# Patient Record
Sex: FEMALE | Race: BLACK OR AFRICAN AMERICAN | NOT HISPANIC OR LATINO | Employment: UNEMPLOYED | URBAN - METROPOLITAN AREA
[De-identification: names, ages, dates, MRNs, and addresses within clinical notes are randomized per-mention and may not be internally consistent; named-entity substitution may affect disease eponyms.]

---

## 2017-11-09 ENCOUNTER — AMBULATORY - HEALTHEAST (OUTPATIENT)
Dept: MULTI SPECIALTY CLINIC | Facility: CLINIC | Age: 27
End: 2017-11-09

## 2017-11-09 LAB — PAP SMEAR - HIM PATIENT REPORTED: NORMAL

## 2019-05-03 ENCOUNTER — OFFICE VISIT - HEALTHEAST (OUTPATIENT)
Dept: FAMILY MEDICINE | Facility: CLINIC | Age: 29
End: 2019-05-03

## 2019-05-03 DIAGNOSIS — F41.1 GENERALIZED ANXIETY DISORDER: ICD-10-CM

## 2019-05-03 DIAGNOSIS — G47.00 PERSISTENT INSOMNIA: ICD-10-CM

## 2019-05-03 DIAGNOSIS — L73.2 SUPPURATIVE HIDRADENITIS: ICD-10-CM

## 2019-05-03 DIAGNOSIS — F10.10 ALCOHOL ABUSE, EPISODIC DRINKING BEHAVIOR: ICD-10-CM

## 2019-05-03 DIAGNOSIS — F33.2 SEVERE RECURRENT MAJOR DEPRESSION WITHOUT PSYCHOTIC FEATURES (H): ICD-10-CM

## 2019-05-03 ASSESSMENT — MIFFLIN-ST. JEOR: SCORE: 1979.15

## 2019-05-05 LAB — BACTERIA SPEC CULT: NORMAL

## 2019-05-06 ENCOUNTER — COMMUNICATION - HEALTHEAST (OUTPATIENT)
Dept: FAMILY MEDICINE | Facility: CLINIC | Age: 29
End: 2019-05-06

## 2019-11-13 ENCOUNTER — OFFICE VISIT - HEALTHEAST (OUTPATIENT)
Dept: FAMILY MEDICINE | Facility: CLINIC | Age: 29
End: 2019-11-13

## 2019-11-13 DIAGNOSIS — S21.001D WOUND OF RIGHT BREAST, SUBSEQUENT ENCOUNTER: ICD-10-CM

## 2019-11-13 DIAGNOSIS — T14.8XXA OPEN WOUND OF SKIN: ICD-10-CM

## 2019-11-13 ASSESSMENT — ANXIETY QUESTIONNAIRES
4. TROUBLE RELAXING: MORE THAN HALF THE DAYS
5. BEING SO RESTLESS THAT IT IS HARD TO SIT STILL: NOT AT ALL
6. BECOMING EASILY ANNOYED OR IRRITABLE: NEARLY EVERY DAY
2. NOT BEING ABLE TO STOP OR CONTROL WORRYING: NEARLY EVERY DAY
3. WORRYING TOO MUCH ABOUT DIFFERENT THINGS: NEARLY EVERY DAY
GAD7 TOTAL SCORE: 15
1. FEELING NERVOUS, ANXIOUS, OR ON EDGE: NEARLY EVERY DAY
7. FEELING AFRAID AS IF SOMETHING AWFUL MIGHT HAPPEN: SEVERAL DAYS

## 2019-11-13 ASSESSMENT — PATIENT HEALTH QUESTIONNAIRE - PHQ9: SUM OF ALL RESPONSES TO PHQ QUESTIONS 1-9: 14

## 2020-10-20 ENCOUNTER — AMBULATORY - HEALTHEAST (OUTPATIENT)
Dept: OTHER | Facility: CLINIC | Age: 30
End: 2020-10-20

## 2020-10-20 ENCOUNTER — DOCUMENTATION ONLY (OUTPATIENT)
Dept: OTHER | Facility: CLINIC | Age: 30
End: 2020-10-20

## 2020-12-18 ENCOUNTER — RECORDS - HEALTHEAST (OUTPATIENT)
Dept: ADMINISTRATIVE | Facility: OTHER | Age: 30
End: 2020-12-18

## 2021-03-04 ENCOUNTER — COMMUNICATION - HEALTHEAST (OUTPATIENT)
Dept: SCHEDULING | Facility: CLINIC | Age: 31
End: 2021-03-04

## 2021-05-04 ENCOUNTER — OFFICE VISIT - HEALTHEAST (OUTPATIENT)
Dept: FAMILY MEDICINE | Facility: CLINIC | Age: 31
End: 2021-05-04

## 2021-05-04 DIAGNOSIS — N94.6 DYSMENORRHEA: ICD-10-CM

## 2021-05-04 DIAGNOSIS — F41.1 GENERALIZED ANXIETY DISORDER: ICD-10-CM

## 2021-05-04 DIAGNOSIS — F33.2 SEVERE EPISODE OF RECURRENT MAJOR DEPRESSIVE DISORDER, WITHOUT PSYCHOTIC FEATURES (H): ICD-10-CM

## 2021-05-04 ASSESSMENT — ANXIETY QUESTIONNAIRES
3. WORRYING TOO MUCH ABOUT DIFFERENT THINGS: NEARLY EVERY DAY
1. FEELING NERVOUS, ANXIOUS, OR ON EDGE: NEARLY EVERY DAY
7. FEELING AFRAID AS IF SOMETHING AWFUL MIGHT HAPPEN: MORE THAN HALF THE DAYS
4. TROUBLE RELAXING: NOT AT ALL
5. BEING SO RESTLESS THAT IT IS HARD TO SIT STILL: SEVERAL DAYS
6. BECOMING EASILY ANNOYED OR IRRITABLE: NEARLY EVERY DAY
GAD7 TOTAL SCORE: 14
2. NOT BEING ABLE TO STOP OR CONTROL WORRYING: MORE THAN HALF THE DAYS

## 2021-05-04 ASSESSMENT — PATIENT HEALTH QUESTIONNAIRE - PHQ9: SUM OF ALL RESPONSES TO PHQ QUESTIONS 1-9: 18

## 2021-05-26 ASSESSMENT — PATIENT HEALTH QUESTIONNAIRE - PHQ9: SUM OF ALL RESPONSES TO PHQ QUESTIONS 1-9: 14

## 2021-05-27 ASSESSMENT — PATIENT HEALTH QUESTIONNAIRE - PHQ9: SUM OF ALL RESPONSES TO PHQ QUESTIONS 1-9: 18

## 2021-05-28 ASSESSMENT — ANXIETY QUESTIONNAIRES
GAD7 TOTAL SCORE: 15
GAD7 TOTAL SCORE: 14

## 2021-05-28 NOTE — TELEPHONE ENCOUNTER
----- Message from Medina Melton CNP sent at 5/5/2019  5:55 PM CDT -----  Please notify the patient that her wound culture is normal.  It did not show an infection.  Thanks.

## 2021-05-28 NOTE — TELEPHONE ENCOUNTER
Left message to call back for: results   Information to relay to patient:  Below message    Was unable to leave VM as patients mailbox is full.

## 2021-06-01 ENCOUNTER — RECORDS - HEALTHEAST (OUTPATIENT)
Dept: ADMINISTRATIVE | Facility: CLINIC | Age: 31
End: 2021-06-01

## 2021-06-02 ENCOUNTER — RECORDS - HEALTHEAST (OUTPATIENT)
Dept: ADMINISTRATIVE | Facility: CLINIC | Age: 31
End: 2021-06-02

## 2021-06-03 VITALS — WEIGHT: 285.5 LBS | BODY MASS INDEX: 50.59 KG/M2 | HEIGHT: 63 IN

## 2021-06-03 NOTE — PATIENT INSTRUCTIONS - HE
Start antibiotic two times a day for 10 days  Stop topical cream from ER  Dermatology referral placed to be seen sooner rather than later  Keep wound covered and change dressing two times a day   Ok to get wet from some water but no soap on the area and consider partially covering while bathing

## 2021-06-03 NOTE — PROGRESS NOTES
Assessment/Plan:    1. Open wound of skin  2. Wound of right breast, subsequent encounter  Pt diagnosed with candidiasis under R breast last week and started topical therapy - pt states a number of days ago she had sloughing of layer of skin and now her wound has been worsening - more painful, draining pus and bleeding. On exam she has a large wound that is draining purulent fluid and is exquisitely tender. Pt does not appear to be systemically ill/septic. Wound was re-dressed today. Recommend starting doxycycline two times a day for 10 days to cover potential MRSA given purulent drainage; recommend stopping topical therapy. Recommend keeping wound covered and changing dressing two times a day. Placed urgent referral to dermatology for further management of this wound.  - doxycycline (MONODOX) 100 MG capsule; Take 1 capsule (100 mg total) by mouth 2 (two) times a day for 10 days.  Dispense: 20 capsule; Refill: 0  - Ambulatory referral to Dermatology      Follow up: as needed    Natacha Guo MD  [facility]    Subjective:    Patient ID: Luis Antonio Laura is a 29 y.o. female is here today for breast wound    Wound on breast  -started a few weeks ago but worsening  -Was seen at [facility] on 11/5, diagnosed with right breast candidiasis, given prescription for topical clotrimazole/betamethasone cream  -Patient states she has been using the cream as prescribed  -She reports a few days ago a large amount of skin sloughed off  -has seen derm at [facility] in the past  -wound worsening since last week - more painful   -draining pus and bleeding  -located under R breast  -can't wear bras and limits pt's ROM of arm  -hx similar but more mild - hx hidradenitis  -no fevers reported      Patient Active Problem List   Diagnosis     Severe recurrent major depression without psychotic features (H)     Persistent insomnia     Generalized anxiety disorder     Alcohol abuse, episodic drinking behavior      Abnormal weight gain     Anemia during pregnancy     Hidradenitis suppurativa     History of cholestasis during pregnancy     Morbid obesity (H)     Morbid obesity with BMI of 50.0-59.9, adult (H)     Need for hepatitis B vaccination     Pregnancy, high-risk     Screening for malignant neoplasm of cervix     Past Medical History:   Diagnosis Date     Cholelithiases      IUD (intrauterine device) in place      Obesity      Past Surgical History:   Procedure Laterality Date     CHOLECYSTECTOMY       INTRAUTERINE DEVICE INSERTION       WISDOM TOOTH EXTRACTION       Current Outpatient Medications on File Prior to Visit   Medication Sig Dispense Refill     ARIPiprazole (ABILIFY) 2 MG tablet Take 1 tablet (2 mg total) by mouth daily. 30 tablet 0     clindamycin phosphate (CLINDAGEL) 1 % gel Apply to the affected area daily as needed. 75 mL 0     clotrimazole-betamethasone (LOTRISONE) cream Apply to affected area 2 times daily for the next week. 15 g 0     gabapentin (NEURONTIN) 100 MG capsule Take 100 mg by mouth 3 (three) times a day as needed. 90 capsule 0     hydrOXYzine pamoate (VISTARIL) 50 MG capsule Take 1 capsule (50 mg total) by mouth 3 (three) times a day as needed for anxiety. 90 capsule 0     traZODone (DESYREL) 50 MG tablet Take 1 tablet (50 mg total) by mouth at bedtime. 30 tablet 0     [DISCONTINUED] SUMAtriptan (IMITREX) 50 MG tablet Take 50 mg by mouth see administration instructions. Take 1 Tablet by mouth as needed for Migraine. May repeat one tablet after 2 hours if needed. Maximum 4 tabs/24 hours and 9 days/month.             No current facility-administered medications on file prior to visit.      Allergies   Allergen Reactions     Penicillins Hives     Sulfa (Sulfonamide Antibiotics) Anaphylaxis     Social History     Socioeconomic History     Marital status: Single     Spouse name: Not on file     Number of children: Not on file     Years of education: Not on file     Highest education level: Not  on file   Occupational History     Not on file   Social Needs     Financial resource strain: Not on file     Food insecurity:     Worry: Not on file     Inability: Not on file     Transportation needs:     Medical: Not on file     Non-medical: Not on file   Tobacco Use     Smoking status: Current Some Day Smoker     Years: 7.00     Smokeless tobacco: Never Used   Substance and Sexual Activity     Alcohol use: Yes     Alcohol/week: 14.0 standard drinks     Types: 14 Glasses of wine per week     Drug use: Yes     Types: Marijuana     Sexual activity: Not on file     Comment: single   Lifestyle     Physical activity:     Days per week: Not on file     Minutes per session: Not on file     Stress: Not on file   Relationships     Social connections:     Talks on phone: Not on file     Gets together: Not on file     Attends [employer] service: Not on file     Active member of club or organization: Not on file     Attends meetings of clubs or organizations: Not on file     Relationship status: Not on file     Intimate partner violence:     Fear of current or ex partner: Not on file     Emotionally abused: Not on file     Physically abused: Not on file     Forced sexual activity: Not on file   Other Topics Concern     Not on file   Social History Narrative     Not on file     Review of systems is as stated in HPI, and the remainder of system review is otherwise negative.    Objective:      /80   Pulse 78   Wt (!) 286 lb 6 oz (129.9 kg)   LMP 10/24/2019   BMI 50.73 kg/m      General appearance: awake, NAD, obese  HEENT: atraumatic, normocephalic, no scleral icterus or injection, ears and nose grossly normal, moist mucous membranes  Lungs: breathing comfortably on room air  Extremities: moving all extremities  Skin: large wound at inferior portion of R breast that appears to have lost overlying skin, draining purulent fluid, exquisitely tender throughout - no clear abscess/induration on exam - wound measures  6x3.5cm  Neuro: alert, oriented x3, CNs grossly intact, no focal deficits appreciated  Psych: normal mood/affect/behavior, answering questions appropriately, linear thought process

## 2021-06-04 VITALS
BODY MASS INDEX: 50.73 KG/M2 | DIASTOLIC BLOOD PRESSURE: 80 MMHG | WEIGHT: 286.38 LBS | SYSTOLIC BLOOD PRESSURE: 120 MMHG | HEART RATE: 78 BPM

## 2021-06-16 PROBLEM — F41.1 GENERALIZED ANXIETY DISORDER: Status: ACTIVE | Noted: 2019-04-24

## 2021-06-16 PROBLEM — O99.019 ANEMIA DURING PREGNANCY: Status: ACTIVE | Noted: 2018-08-28

## 2021-06-16 PROBLEM — Z23 NEED FOR HEPATITIS B VACCINATION: Status: ACTIVE | Noted: 2018-03-12

## 2021-06-16 PROBLEM — G47.00 PERSISTENT INSOMNIA: Status: ACTIVE | Noted: 2019-04-24

## 2021-06-16 PROBLEM — Z87.59 HISTORY OF CHOLESTASIS DURING PREGNANCY: Status: ACTIVE | Noted: 2018-10-12

## 2021-06-16 PROBLEM — Z87.19 HISTORY OF CHOLESTASIS DURING PREGNANCY: Status: ACTIVE | Noted: 2018-10-12

## 2021-06-16 PROBLEM — O09.90 PREGNANCY, HIGH-RISK: Status: ACTIVE | Noted: 2019-11-13

## 2021-06-16 PROBLEM — E66.01 MORBID OBESITY WITH BMI OF 50.0-59.9, ADULT (H): Status: ACTIVE | Noted: 2018-08-17

## 2021-06-16 PROBLEM — F10.10 ALCOHOL ABUSE, EPISODIC DRINKING BEHAVIOR: Status: ACTIVE | Noted: 2019-04-24

## 2021-06-17 NOTE — PROGRESS NOTES
Luis Antonio Laura is a 30 y.o. female who is being evaluated via a billable telephone visit.      What phone number would you like to be contacted at? [phone]  How would you like to obtain your AVS? AVS Preference: Kayla.    1. Dysmenorrhea  naproxen (NAPROSYN) 500 MG tablet    Ambulatory referral to Obstetrics / Gynecology   2. Generalized anxiety disorder     3. Severe episode of recurrent major depressive disorder, without psychotic features (H)       Will treat dysmenorrhea with naproxen 500 mg twice daily with food as needed.  Educated on its indications and side effects.  She is to avoid taking this with others NSAIDs.  Offered to have her follow-up in clinic to replace the Kyleena IUD with Mirena IUD, but she declines.  Will refer to OB/GYN to discuss other treatment options.  Patient has severe depression and anxiety.  She is seeing psychiatry at [facility].  I encouraged her to reach out to them regarding her increased PHQ-9 number and for further medication management.  She has crisis numbers at home.  She is content with the plan.    Subjective   Luis Antonio Laura is 30 y.o. and presents today for the following health issues   HPI   Patient calls today with concerns of dysmenorrhea.  Patient has been experiencing menstrual cramps the week before her period and during her period since adolescents.  She did have the Mirena IUD in place for multiple years which provided significant assistance.  When she went to have this replaced at [facility], they told her that they would only replace it with the Kyleena.  Patient has had the Kyleena IUD for 1 to 2 years and her menstrual cramps have worsened.  She has irregular menses and when she does experience a period, the cramps will wake her up at night.  Her last menstrual period was 1 month ago and lasted for 4 days.  She has moderate vaginal bleeding.  She denies nausea or vomiting.  She has tried using over-the-counter Tylenol,  ibuprofen, Aleve, Midol, and Pamprin.  Patient has anxiety and depression.  She sees [facility] where she was treated by psychiatrist.  She is currently taking olanzapine, venlafaxine, Abilify, and gabapentin.  She has crisis numbers at home.              Review of Systems  As noted above, otherwise negative.      Objective       Vitals:  No vitals were obtained today due to virtual visit.    Physical Exam  Patient is alert and is speaking clearly.  She does not sound short of breath or have a cough.          Phone call duration: 9 minutes

## 2021-06-25 ENCOUNTER — COMMUNICATION - HEALTHEAST (OUTPATIENT)
Dept: FAMILY MEDICINE | Facility: CLINIC | Age: 31
End: 2021-06-25

## 2021-06-25 ENCOUNTER — AMBULATORY - HEALTHEAST (OUTPATIENT)
Dept: FAMILY MEDICINE | Facility: CLINIC | Age: 31
End: 2021-06-25

## 2021-06-25 DIAGNOSIS — B96.89 BV (BACTERIAL VAGINOSIS): ICD-10-CM

## 2021-06-25 DIAGNOSIS — N76.0 BV (BACTERIAL VAGINOSIS): ICD-10-CM

## 2021-07-05 PROBLEM — O09.90 PREGNANCY, HIGH-RISK: Status: RESOLVED | Noted: 2019-11-13 | Resolved: 2021-06-25

## 2021-07-05 PROBLEM — E66.01 MORBID OBESITY WITH BMI OF 50.0-59.9, ADULT (H): Status: RESOLVED | Noted: 2018-08-17 | Resolved: 2021-06-25

## 2021-07-05 PROBLEM — O99.019 ANEMIA DURING PREGNANCY: Status: RESOLVED | Noted: 2018-08-28 | Resolved: 2021-06-25

## 2021-07-07 NOTE — TELEPHONE ENCOUNTER
Reason contacted:  Test results within this encounter   Information relayed:  Dr Guo's notes below   Additional questions:  No  Further follow-up needed:  No  Okay to leave a detailed message:  No

## 2021-07-07 NOTE — TELEPHONE ENCOUNTER
----- Message from Natacha Guo MD sent at 6/25/2021  2:21 PM CDT -----  Please call patient to let her know her vaginal swab came back positive for bacterial vaginosis (BV) infection.  This is a common vaginal infection and is not sexually transmitted.  I have sent an antibiotic to her pharmacy for treatment.    Thanks,  Dr. Guo

## 2021-07-14 PROBLEM — O99.019 ANEMIA DURING PREGNANCY: Status: RESOLVED | Noted: 2018-08-28 | Resolved: 2021-06-25

## 2021-07-14 PROBLEM — O09.90 PREGNANCY, HIGH-RISK: Status: RESOLVED | Noted: 2019-11-13 | Resolved: 2021-06-25

## 2021-08-03 PROBLEM — E66.01 MORBID OBESITY WITH BMI OF 50.0-59.9, ADULT (H): Status: RESOLVED | Noted: 2018-08-17 | Resolved: 2021-06-25

## 2021-10-11 ENCOUNTER — TELEPHONE (OUTPATIENT)
Dept: FAMILY MEDICINE | Facility: CLINIC | Age: 31
End: 2021-10-11

## 2021-10-11 NOTE — TELEPHONE ENCOUNTER
Reason for call:  Pregnancy Confirmation     Phone number to reach patient:  [phone]    Best Time:  Anytime before 3pm    Can we leave a detailed message on this number?  YES

## 2021-10-15 ENCOUNTER — OFFICE VISIT (OUTPATIENT)
Dept: FAMILY MEDICINE | Facility: CLINIC | Age: 31
End: 2021-10-15
Payer: COMMERCIAL

## 2021-10-15 VITALS
HEART RATE: 101 BPM | SYSTOLIC BLOOD PRESSURE: 100 MMHG | DIASTOLIC BLOOD PRESSURE: 60 MMHG | WEIGHT: 264.19 LBS | BODY MASS INDEX: 48.32 KG/M2

## 2021-10-15 DIAGNOSIS — N91.2 AMENORRHEA: Primary | ICD-10-CM

## 2021-10-15 DIAGNOSIS — F33.1 MODERATE EPISODE OF RECURRENT MAJOR DEPRESSIVE DISORDER (H): ICD-10-CM

## 2021-10-15 DIAGNOSIS — Z87.59 HISTORY OF CHOLESTASIS DURING PREGNANCY: ICD-10-CM

## 2021-10-15 DIAGNOSIS — Z87.51 HISTORY OF PRETERM DELIVERY: ICD-10-CM

## 2021-10-15 DIAGNOSIS — E66.01 MORBID OBESITY (H): ICD-10-CM

## 2021-10-15 DIAGNOSIS — Z87.19 HISTORY OF CHOLESTASIS DURING PREGNANCY: ICD-10-CM

## 2021-10-15 DIAGNOSIS — F41.1 GENERALIZED ANXIETY DISORDER: ICD-10-CM

## 2021-10-15 LAB — HCG UR QL: POSITIVE

## 2021-10-15 PROCEDURE — 81025 URINE PREGNANCY TEST: CPT | Performed by: FAMILY MEDICINE

## 2021-10-15 PROCEDURE — 99214 OFFICE O/P EST MOD 30 MIN: CPT | Performed by: FAMILY MEDICINE

## 2021-10-15 RX ORDER — B-COMPLEX WITH VITAMIN C
1 TABLET ORAL DAILY
COMMUNITY
Start: 2021-06-25 | End: 2022-12-14

## 2021-10-15 RX ORDER — OLANZAPINE 10 MG/1
10 TABLET ORAL DAILY
COMMUNITY
Start: 2020-12-20 | End: 2022-01-13

## 2021-10-15 RX ORDER — VENLAFAXINE 75 MG/1
75 TABLET ORAL DAILY
COMMUNITY
End: 2022-01-13

## 2021-10-15 NOTE — PATIENT INSTRUCTIONS
You can wait for the hospital to call you to set up your ultrasound OR you can call [phone] to schedule it    Reach out to your psychiatrist to see if they want to make any medication changes now that you are pregnant      Tips to Relieve Nausea  Although nausea can occur at any time of the day, it may be worse in the morning. To help prevent nausea:    Eat small, light meals at frequent intervals.    Get up slowly. Eat a few unsalted crackers before you get out of bed.    Drink water or 7-Up to soothe your stomach.    Eat an ice pop in your favorite flavor.    Ask your health care provider about taking saji or vitamin B6/doxylamine for nausea and vomiting.    Talk with your health care provider if you take vitamins that upset your stomach.  Safe Medications    Take one prenatal vitamin with at least 400 mcg of folic acid daily.    Use acetaminophen (Tylenol) for pain.     Try Maalox or Tums for heartburn. If these are not working, talk to your doctor about trying a different medication.    Diphenhydramine (Benadryl) can also be used safely in pregnancy.    Talk to your doctor about any other medications or vitamins you are taking!  Start Healthy Habits Now  What matters most is protecting your baby from this moment on. If you smoke, drink alcohol, or use drugs, now is the time to stop. If you need help, talk with your health care provider.    Smoking increases the risk of miscarriage or having a low-birth-weight baby. If you smoke, quit now.    Alcohol and drugs have been linked with miscarriage, birth defects, intellectual disability, and low birth weight. Do not drink alcohol or take drugs.    Continue your current exercise routine, or start one with walking, swimming, and other low impact exercises. Yoga specifically designed for pregnant moms is a great stress and pain reliever. Keep your self well hydrated and avoid overheating with all activity. If bleeding, fluid leakage, or cramping/contractions  occur, stop the exercise and call your doctor.     Wear your seatbelt every time you are in the car. Fasten the lap part underneath your growing belly and the shoulder part as you normally would.   Weight Gain    Add an additional 300 to 400 calories a day into your diet.    Ideal weight gain is 25 to 35 pounds.    If your BMI is over 30, your ideal weight gain is 15 pounds.    If your BMI is under 20, your ideal weight gain is 40 pounds.    Talk to your doctor if you are concerned about weight gain.   Keep Your Environment Save  You can still clean house and use scented products. Just take some simple precautions:    Wear gloves when using cleaning fluids.    Open windows to let in fresh air. Use a fan if you paint.    Avoid secondhand smoke.    Don t breathe fumes from nail polish, hair spray, cleansers, or other chemicals.  Work Concerns  The end of the first trimester is a good time to discuss working during pregnancy with your employer. Follow your health care provider s advice if your job requires you to stand for a long time, work with hazardous tools, or even sit at a desk all day. Your workspace, workload, or scheduled hours may need to be adjusted - perhaps you can change body postures more often or take an extra break.  Intimacy  Unless your health care provider tells you to, there is no reason to stop having sex while you re pregnant. You or your partner may notice changes in desire. Desire may be less in the first trimester, due to nausea and fatigue. In the second trimester, sex may be very enjoyable. The third trimester can be a challenge comfort-wise. Try different positions and see what s best for you both. As always, let your doctor know if you experience any bleeding, leakage of fluids, or cramping/contractions.  Other Pregnancy Concerns    Limit the amount of radiation you are exposed to. Always tell the radiology technologist that you are pregnant if having an xray or CT scan done.     Practice  good hand washing to prevent infection.    Avoid cat litter.     Wash all fruits and vegetabes. Always cook meat thoroughly and do not eat raw fish. Do not eat more than 6 to 12 ounces of other fish sources.     Do not eat soft cheeses.    Limit caffeine to less than 300 milligrams a days. The average cup of coffee as approximately 120 milligrams of caffeine.

## 2021-10-15 NOTE — PROGRESS NOTES
Assessment/Plan:    Amenorrhea  UPT positive.  at 6+6 weeks by LMP. Overall doing well, discussed management of nausea. Dating US ordered. Prenatal folder given. Pt is already taking prenatal vitamins. She will follow with me for her prenatal care.   - HCG qualitative urine  - US OB < 14 Weeks Single    History of cholestasis during pregnancy  History of  delivery  Hx cholestasis during both prior pregnancies requiring induction/delivery around 34-36 weeks; discussed high risk for cholestasis again this pregnancy. Will plan to collect baseline labs (LFTs, bile acids) and then monitor closely.    Generalized anxiety disorder  Moderate episode of recurrent major depressive disorder (H)  Follows with psychiatry - currently taking effexor and zyprexa - discussed both of these can be used in pregnancy if needed for maternal mental health care/stability - encouraged pt to reach out to her psychiatrist to see if they recommend any dose/medication changes.    Morbid obesity (H)  BMI 48.32 today.          Follow up: 2-4 weeks for initial OB visit (timing depends on dating US)    Natacha Guo MD  [facility]    Subjective:   Luis Antonio Laura is a 31 year old female is here today for pregnancy confirmation    Pregnancy confirmation  Nausea - feeling sick and taste specific/particular, will vomit sometimes  Tired   A little breast tenderness - HS flaring a bit  Mood - doing good - venlafaxine, olanzapine - sees psychiatry    Prior pregnancies  -cholestasis in both pregnancies at the end  34-35 weeks - overall did well, NICU  No hx PPH    LMP 21 - BRENNON 22 - 6+6 weeks today      Patient Active Problem List   Diagnosis     Severe episode of recurrent major depressive disorder (H)     Persistent insomnia     Generalized anxiety disorder     Alcohol abuse, episodic drinking behavior     Hidradenitis suppurativa     History of cholestasis during pregnancy     Morbid obesity (H)     Need for  hepatitis B vaccination     Screening for malignant neoplasm of cervix     Past Medical History:   Diagnosis Date     Cholelithiases      Obesity      Past Surgical History:   Procedure Laterality Date     CHOLECYSTECTOMY       INSERT INTRAUTERINE DEVICE       WISDOM TOOTH EXTRACTION       Current Outpatient Medications   Medication     OLANZapine (ZYPREXA) 10 MG tablet     Prenatal Multivit-Min-Fe-FA (PRENATAL, W/IRON & FA,) 27-0.8 MG TABS     venlafaxine (EFFEXOR) 75 MG tablet     No current facility-administered medications for this visit.     Allergies   Allergen Reactions     Penicillins Hives and Rash     itching       Sulfa Drugs      Social History     Socioeconomic History     Marital status: Not on file     Spouse name: Not on file     Number of children: Not on file     Years of education: Not on file     Highest education level: Not on file   Occupational History     Not on file   Tobacco Use     Smoking status: Current Some Day Smoker     Years: 7.00     Smokeless tobacco: Never Used   Substance and Sexual Activity     Alcohol use: Yes     Alcohol/week: 14.0 standard drinks     Drug use: Yes     Types: Marijuana     Sexual activity: Not on file     Comment: single   Other Topics Concern     Not on file   Social History Narrative     Not on file     Social Determinants of Health     Financial Resource Strain:      Difficulty of Paying Living Expenses:    Food Insecurity:      Worried About Running Out of Food in the Last Year:      Ran Out of Food in the Last Year:    Transportation Needs:      Lack of Transportation (Medical):      Lack of Transportation (Non-Medical):    Physical Activity:      Days of Exercise per Week:      Minutes of Exercise per Session:    Stress:      Feeling of Stress :    Social Connections:      Frequency of Communication with Friends and Family:      Frequency of Social Gatherings with Friends and Family:      Attends [employer] Services:      Active Member of Clubs or  Organizations:      Attends Club or Organization Meetings:      Marital Status:    Intimate Partner Violence:      Fear of Current or Ex-Partner:      Emotionally Abused:      Physically Abused:      Sexually Abused:      Family History   Problem Relation Age of Onset     Multiple Sclerosis Mother      Hypertension Maternal Grandmother      Diabetes Maternal Grandmother      Heart Disease Maternal Grandmother      Hypertension Paternal Grandmother      Review of systems is as stated in HPI, and the remainder of system review is otherwise negative.    Objective:     /60   Pulse 101   Wt 119.8 kg (264 lb 3 oz)   LMP 10/15/2021   BMI 48.32 kg/m      General appearance: awake, NAD, obese  HEENT: atraumatic, normocephalic, no scleral icterus or injection  Lungs: breathing comfortably on room air  Extremities: moving all extremities  Neuro: alert, oriented x3, CNs grossly intact, no focal deficits appreciated  Psych: normal mood/affect/behavior, answering questions appropriately, linear thought process

## 2021-10-15 NOTE — PROGRESS NOTES
Nausea - feeling sick and taste specific/particular, will vomit sometimes  Tired   A little breast tenderness - HS flaring a bit  Mood - doing good - venlafaxine, olanzapine    Prior pregnancies  -cholestasis in both pregnancies at the end  34-35 weeks - overall did well, NICU  No hx PPH    LMP 8/28/21

## 2021-10-16 ENCOUNTER — HEALTH MAINTENANCE LETTER (OUTPATIENT)
Age: 31
End: 2021-10-16

## 2021-10-22 ENCOUNTER — HOSPITAL ENCOUNTER (OUTPATIENT)
Dept: ULTRASOUND IMAGING | Facility: CLINIC | Age: 31
Discharge: HOME | End: 2021-10-22
Attending: FAMILY MEDICINE | Admitting: FAMILY MEDICINE
Payer: COMMERCIAL

## 2021-10-22 DIAGNOSIS — N91.2 AMENORRHEA: ICD-10-CM

## 2021-10-22 PROCEDURE — 76801 OB US < 14 WKS SINGLE FETUS: CPT

## 2021-12-15 NOTE — PROGRESS NOTES
Clinic Note - Initial OB Visit    Luis Antonio Laura is a 31 year old  female who presents to clinic for a follow up OB visit. She is currently 15+5 wks with an estimated date of delivery of 2022 via LMP c/w 1st tri US. She denies headache, chest pain, shortness of breath, abdominal pain/contractions, vaginal bleeding, or abnormal discharge. She has not felt baby move.      New concerns today:   -hx PONCHO/depression - manageable/tolerable   -feeling ok overall  -sometimes getting an occasional cramp on R side, 4/10, changing position or if standing up    OB History    Para Term  AB Living   3 2 0 2 0 2   SAB IAB Ectopic Multiple Live Births   0 0 0 0 2      # Outcome Date GA Lbr Burak/2nd Weight Sex Delivery Anes PTL Lv   3 Current            2  10/16/18 36w6d 02:07 / 00:18 2.41 kg (5 lb 5 oz) M Vag-Spont  N PORSHA      Name: Zeb      Apgar1: 9  Apgar5: 9   1  07/05/10 36w2d  2.353 kg (5 lb 3 oz) M Vag-Spont   PORSHA      Name: Sadiq       Physical exam:  /78 (BP Location: Left arm, Patient Position: Sitting, Cuff Size: Adult Regular)   Wt 118.3 kg (260 lb 14.4 oz)   LMP 2021   BMI 47.72 kg/m      General: alert, female in no acute distress  Abdomen: gravid uterus appropriate for gestation age above pubic symphysis, non-tender, FHTs 150  Extremities: no edema    Supervision of high risk pregnancy, antepartum   at 15+5 weeks. Pregnancy complicated by conditions as below. Labs as below.   - ABO/Rh type and screen  - Hepatitis B surface antigen  - CBC with platelets  - HIV Antigen Antibody Combo  - Rubella Antibody IgG  - Treponema Abs w Reflex to RPR and Titer  - Urine Culture Aerobic Bacterial  - Chlamydia trachomatis PCR  - Neisseria gonorrhoeae PCR  - Vitamin D Deficiency  - Hepatitis C antibody  - Hemoglobin A1c  - [facility] Referral - Pregnancy    Morbid obesity (H)  BMI >40. Referral to [facility] for level 2 anatomy scan. Will need early 1 hr GCT, visits  every 2 weeks at 28 weeks, weekly  testing at 36 weeks, offer induction at 39 weeks, goal weight gain <10 lbs.  - [facility] Referral - Pregnancy    History of cholestasis during pregnancy  History of  delivery  Hx  delivery x2 d/t induction for cholestasis. High risk of recurrence in this pregnancy, pt will monitor for symptoms.   - [facility] Referral - Pregnancy    Generalized anxiety disorder  Moderate episode of recurrent major depressive disorder (H)  Pt reports sx overall controlled at this time - working with psychiatry.        Reviewed pre-goran labs - O pos  Reviewed genetic screening options, including false positive rate of 5% with screening tests and diagnostic options (chorionic villus sampling, amniocentesis), and patient declines  Continued recommendation for breastfeeding.  Discussed warning signs to watch for and expectations for second trimester.     Follow up in 4 weeks for routine prenatal visit.     Natacha Guo MD  [facility]

## 2021-12-16 ENCOUNTER — PRENATAL OFFICE VISIT (OUTPATIENT)
Dept: FAMILY MEDICINE | Facility: CLINIC | Age: 31
End: 2021-12-16
Payer: COMMERCIAL

## 2021-12-16 ENCOUNTER — TRANSCRIBE ORDERS (OUTPATIENT)
Dept: MATERNAL FETAL MEDICINE | Facility: HOSPITAL | Age: 31
End: 2021-12-16

## 2021-12-16 VITALS — DIASTOLIC BLOOD PRESSURE: 78 MMHG | BODY MASS INDEX: 47.72 KG/M2 | SYSTOLIC BLOOD PRESSURE: 122 MMHG | WEIGHT: 260.9 LBS

## 2021-12-16 DIAGNOSIS — E66.01 MORBID OBESITY (H): ICD-10-CM

## 2021-12-16 DIAGNOSIS — Z87.51 HISTORY OF PRETERM DELIVERY: ICD-10-CM

## 2021-12-16 DIAGNOSIS — Z87.19 HISTORY OF CHOLESTASIS DURING PREGNANCY: ICD-10-CM

## 2021-12-16 DIAGNOSIS — F41.1 GENERALIZED ANXIETY DISORDER: ICD-10-CM

## 2021-12-16 DIAGNOSIS — Z87.59 HISTORY OF CHOLESTASIS DURING PREGNANCY: ICD-10-CM

## 2021-12-16 DIAGNOSIS — O09.90 SUPERVISION OF HIGH RISK PREGNANCY, ANTEPARTUM: Primary | ICD-10-CM

## 2021-12-16 DIAGNOSIS — O26.90 PREGNANCY RELATED CONDITION, ANTEPARTUM: Primary | ICD-10-CM

## 2021-12-16 DIAGNOSIS — F33.1 MODERATE EPISODE OF RECURRENT MAJOR DEPRESSIVE DISORDER (H): ICD-10-CM

## 2021-12-16 LAB
ABO/RH(D): NORMAL
ANTIBODY SCREEN: NEGATIVE
ERYTHROCYTE [DISTWIDTH] IN BLOOD BY AUTOMATED COUNT: 12.1 % (ref 10–15)
HBA1C MFR BLD: 5.2 % (ref 0–5.6)
HCT VFR BLD AUTO: 32.7 % (ref 35–47)
HGB BLD-MCNC: 11.4 G/DL (ref 11.7–15.7)
HIV 1+2 AB+HIV1 P24 AG SERPL QL IA: NEGATIVE
MCH RBC QN AUTO: 31.3 PG (ref 26.5–33)
MCHC RBC AUTO-ENTMCNC: 34.9 G/DL (ref 31.5–36.5)
MCV RBC AUTO: 90 FL (ref 78–100)
PLATELET # BLD AUTO: 330 10E3/UL (ref 150–450)
RBC # BLD AUTO: 3.64 10E6/UL (ref 3.8–5.2)
SPECIMEN EXPIRATION DATE: NORMAL
WBC # BLD AUTO: 8.8 10E3/UL (ref 4–11)

## 2021-12-16 PROCEDURE — 85027 COMPLETE CBC AUTOMATED: CPT | Performed by: FAMILY MEDICINE

## 2021-12-16 PROCEDURE — 86803 HEPATITIS C AB TEST: CPT | Performed by: FAMILY MEDICINE

## 2021-12-16 PROCEDURE — 99207 PR FIRST OB VISIT: CPT | Performed by: FAMILY MEDICINE

## 2021-12-16 PROCEDURE — 87389 HIV-1 AG W/HIV-1&-2 AB AG IA: CPT | Performed by: FAMILY MEDICINE

## 2021-12-16 PROCEDURE — 83036 HEMOGLOBIN GLYCOSYLATED A1C: CPT | Performed by: FAMILY MEDICINE

## 2021-12-16 PROCEDURE — 86900 BLOOD TYPING SEROLOGIC ABO: CPT | Performed by: FAMILY MEDICINE

## 2021-12-16 PROCEDURE — 36415 COLL VENOUS BLD VENIPUNCTURE: CPT | Performed by: FAMILY MEDICINE

## 2021-12-16 PROCEDURE — 86762 RUBELLA ANTIBODY: CPT | Performed by: FAMILY MEDICINE

## 2021-12-16 PROCEDURE — 99213 OFFICE O/P EST LOW 20 MIN: CPT | Performed by: FAMILY MEDICINE

## 2021-12-16 PROCEDURE — 86780 TREPONEMA PALLIDUM: CPT | Performed by: FAMILY MEDICINE

## 2021-12-16 PROCEDURE — 82306 VITAMIN D 25 HYDROXY: CPT | Performed by: FAMILY MEDICINE

## 2021-12-16 PROCEDURE — 86850 RBC ANTIBODY SCREEN: CPT | Performed by: FAMILY MEDICINE

## 2021-12-16 PROCEDURE — 87086 URINE CULTURE/COLONY COUNT: CPT | Performed by: FAMILY MEDICINE

## 2021-12-16 PROCEDURE — 86901 BLOOD TYPING SEROLOGIC RH(D): CPT | Performed by: FAMILY MEDICINE

## 2021-12-16 PROCEDURE — 87340 HEPATITIS B SURFACE AG IA: CPT | Performed by: FAMILY MEDICINE

## 2021-12-16 RX ORDER — PRENATAL VIT/IRON FUM/FOLIC AC 27MG-0.8MG
1 TABLET ORAL DAILY
COMMUNITY
Start: 2021-12-08 | End: 2022-02-04

## 2021-12-16 NOTE — PATIENT INSTRUCTIONS
Phone Numbers:  [facility]: [phone]  [facility]: [phone]    When to call or come in to the hospital     If you have any bleeding or leakage of fluids.     If you have uterine cramping that does not resolve with rest and fluids.    If you have a headache not resolved with tylenol, right upper abdominal pain, or sudden onset of swelling.    You know your body best. Never hesitate to call or go to the hospital if something doesn't feel right!    Genetic Screening    Sampling of your blood to screen for genetic abnormalities, like Down's syndrome, in your baby    Done at 16-18 weeks gestation    Screening test only - further testing, like advanced ultrasound or amniocentesis, would be needed to confirm positive test!    Recommended for mothers over age 35, history of genetic abnormalities.    Talk to your doctor if you have further questions, or are interested in this screening test.     Breastfeeding    Breast feeding is best, for you and baby!     Recommendations are for exclusive breastfeeding for babies first 6 months of life.    Talk to your doctor if you have more questions about breastfeeding your baby.    Other Pregnancy Concerns    Continue to take a prenatal vitamin daily    Maintain an active, healthy lifestyle.     If this is your first baby, you can expect to start feeling movement at 20-24 weeks gestation. If this is your second or more pregnancy, you will generally start feeling movement at 18-22 weeks gestation.   [facility] parenting classes [email or web address]  [facility] parenting classes [email or web address]  Free online classes through [employer]

## 2021-12-17 LAB
DEPRECATED CALCIDIOL+CALCIFEROL SERPL-MC: 18 UG/L (ref 30–80)
HBV SURFACE AG SERPL QL IA: NONREACTIVE
HCV AB SERPL QL IA: NEGATIVE
RUBV IGG SERPL QL IA: 1.28 INDEX
RUBV IGG SERPL QL IA: POSITIVE
T PALLIDUM AB SER QL: NONREACTIVE

## 2021-12-18 LAB — BACTERIA UR CULT: NORMAL

## 2022-01-10 ENCOUNTER — OFFICE VISIT (OUTPATIENT)
Dept: MATERNAL FETAL MEDICINE | Facility: HOSPITAL | Age: 32
End: 2022-01-10
Attending: FAMILY MEDICINE
Payer: COMMERCIAL

## 2022-01-10 ENCOUNTER — ANCILLARY PROCEDURE (OUTPATIENT)
Dept: ULTRASOUND IMAGING | Facility: HOSPITAL | Age: 32
End: 2022-01-10
Attending: FAMILY MEDICINE
Payer: COMMERCIAL

## 2022-01-10 DIAGNOSIS — O35.9XX0 SUSPECTED FETAL ANOMALY, ANTEPARTUM, SINGLE OR UNSPECIFIED FETUS: ICD-10-CM

## 2022-01-10 DIAGNOSIS — O99.210 OBESITY IN PREGNANCY: Primary | ICD-10-CM

## 2022-01-10 DIAGNOSIS — O26.90 PREGNANCY RELATED CONDITION, ANTEPARTUM: ICD-10-CM

## 2022-01-10 PROCEDURE — 76811 OB US DETAILED SNGL FETUS: CPT | Mod: 26 | Performed by: OBSTETRICS & GYNECOLOGY

## 2022-01-10 PROCEDURE — 99207 PR NO CHARGE LOS: CPT | Performed by: OBSTETRICS & GYNECOLOGY

## 2022-01-10 PROCEDURE — 76811 OB US DETAILED SNGL FETUS: CPT

## 2022-01-10 NOTE — PROGRESS NOTES
The patient was seen for an ultrasound in the [facility] at the [facility] today.  For a detailed report of the ultrasound examination, please see the ultrasound report which can be found under the imaging tab.    Lisa Contreras MD  , OB/GYN  Maternal-Fetal Medicine  [phone] (Pager)

## 2022-01-14 ENCOUNTER — TELEPHONE (OUTPATIENT)
Dept: FAMILY MEDICINE | Facility: CLINIC | Age: 32
End: 2022-01-14

## 2022-01-14 NOTE — TELEPHONE ENCOUNTER
Please schedule f/u OB appt with Dr. Guo. There was an error in the system and the appointment was not saved for some odd reason. Left message for patient to call back.

## 2022-01-20 ENCOUNTER — TRANSFERRED RECORDS (OUTPATIENT)
Dept: HEALTH INFORMATION MANAGEMENT | Facility: CLINIC | Age: 32
End: 2022-01-20
Payer: COMMERCIAL

## 2022-01-31 ENCOUNTER — OFFICE VISIT (OUTPATIENT)
Dept: MATERNAL FETAL MEDICINE | Facility: HOSPITAL | Age: 32
End: 2022-01-31
Attending: OBSTETRICS & GYNECOLOGY
Payer: COMMERCIAL

## 2022-01-31 ENCOUNTER — ANCILLARY PROCEDURE (OUTPATIENT)
Dept: ULTRASOUND IMAGING | Facility: HOSPITAL | Age: 32
End: 2022-01-31
Attending: OBSTETRICS & GYNECOLOGY
Payer: COMMERCIAL

## 2022-01-31 DIAGNOSIS — O99.210 OBESITY IN PREGNANCY: Primary | ICD-10-CM

## 2022-01-31 DIAGNOSIS — O35.9XX0 SUSPECTED FETAL ANOMALY, ANTEPARTUM, SINGLE OR UNSPECIFIED FETUS: ICD-10-CM

## 2022-01-31 DIAGNOSIS — O99.210 OBESITY IN PREGNANCY: ICD-10-CM

## 2022-01-31 PROCEDURE — 76816 OB US FOLLOW-UP PER FETUS: CPT | Mod: 26 | Performed by: OBSTETRICS & GYNECOLOGY

## 2022-01-31 PROCEDURE — 76816 OB US FOLLOW-UP PER FETUS: CPT

## 2022-01-31 PROCEDURE — 99207 PR NO CHARGE LOS: CPT | Performed by: OBSTETRICS & GYNECOLOGY

## 2022-01-31 NOTE — PROGRESS NOTES
"Please see \"Imaging\" tab under \"Chart Review\" for details of today's visit.    Lisset Avitia    "

## 2022-02-04 ENCOUNTER — PRENATAL OFFICE VISIT (OUTPATIENT)
Dept: FAMILY MEDICINE | Facility: CLINIC | Age: 32
End: 2022-02-04
Payer: COMMERCIAL

## 2022-02-04 VITALS
BODY MASS INDEX: 47.29 KG/M2 | HEIGHT: 62 IN | RESPIRATION RATE: 16 BRPM | TEMPERATURE: 99 F | DIASTOLIC BLOOD PRESSURE: 62 MMHG | OXYGEN SATURATION: 99 % | SYSTOLIC BLOOD PRESSURE: 110 MMHG | HEART RATE: 104 BPM | WEIGHT: 257 LBS

## 2022-02-04 DIAGNOSIS — Z87.19 HISTORY OF CHOLESTASIS DURING PREGNANCY: ICD-10-CM

## 2022-02-04 DIAGNOSIS — Z87.59 HISTORY OF CHOLESTASIS DURING PREGNANCY: ICD-10-CM

## 2022-02-04 DIAGNOSIS — O09.90 HIGH-RISK PREGNANCY, UNSPECIFIED TRIMESTER: Primary | ICD-10-CM

## 2022-02-04 DIAGNOSIS — Z23 HIGH PRIORITY FOR 2019-NCOV VACCINE: ICD-10-CM

## 2022-02-04 PROCEDURE — 99207 PR PRENATAL VISIT: CPT | Performed by: FAMILY MEDICINE

## 2022-02-04 PROCEDURE — 0051A COVID-19,PF,PFIZER (12+ YRS): CPT | Performed by: FAMILY MEDICINE

## 2022-02-04 PROCEDURE — 91305 COVID-19,PF,PFIZER (12+ YRS): CPT | Performed by: FAMILY MEDICINE

## 2022-02-04 ASSESSMENT — MIFFLIN-ST. JEOR: SCORE: 1828.99

## 2022-02-04 NOTE — PROGRESS NOTES
"SUBJECTIVE:   at 22w6d. Estimated Date of Delivery: 2022.  She reports her nausea is improved within the last 2 weeks but still with lack of appetite. She denies abdominal pain/cramping, vaginal bleeding, leakage of fluid. Fetal movement is present- started feeling about a month ago  Concerns: Insomnia- worse since stopping her psychiatric medications late December. She was previously on effexor and zyprexa. She follows with [facility], Pineda Farfan. She was previously following for care with Dr. Guo at [facility] but she recently moved and wants to transfer care here to [place].  ROS  Negative except as noted above in HPI.  OBJECTIVE:  Blood pressure 110/62, pulse 104, temperature 99  F (37.2  C), temperature source Oral, resp. rate 16, height 1.575 m (5' 2\"), weight 116.6 kg (257 lb), last menstrual period 2021, SpO2 99 %.  Gen: comfortable, no acute distress.  See OB Vitals flowsheet.  ASSESSMENT/PLAN:  Luis Antonio was seen today for prenatal care, insomnia and imm/inj.    Diagnoses and all orders for this visit:    High-risk pregnancy, unspecified trimester    History of cholestasis during pregnancy: Monitor symptoms closely- will need labs and  surveillance if she develops symptoms.    High priority for 2019-nCoV vaccine: Reviewed safety, effectiveness  -     COVID-19,PF,PFIZER (12+ Yrs GRAY LABEL)    Depression/Anxiety: ARMANDO completed today for [facility], provider Pineda Farfan. She was previously taking effexor and zyprexa but stopped these in late December. Mood and sleep has worsened since that time. Reviewed risk/benefit of medications and I will discuss with her psychiatric provider, but encouraged her to restart her mediations.    History of obesity: Continue with growth US per [facility] recommendation q4-6 weeks.     -IUP at 22w6d (transfer of care due to move):     Prenatal labs reviewed     Fetal survey was done at [facility]- normal     RTC in 4 weeks or " sooner with problems. 1 hour GTT, syphilis, hemoglobin next visit      Elena June MD

## 2022-02-25 ENCOUNTER — IMMUNIZATION (OUTPATIENT)
Dept: NURSING | Facility: CLINIC | Age: 32
End: 2022-02-25
Attending: FAMILY MEDICINE
Payer: COMMERCIAL

## 2022-02-25 PROCEDURE — 91305 COVID-19,PF,PFIZER (12+ YRS): CPT

## 2022-02-25 PROCEDURE — 0052A COVID-19,PF,PFIZER (12+ YRS): CPT

## 2022-02-28 ENCOUNTER — OFFICE VISIT (OUTPATIENT)
Dept: MATERNAL FETAL MEDICINE | Facility: HOSPITAL | Age: 32
End: 2022-02-28
Attending: OBSTETRICS & GYNECOLOGY
Payer: COMMERCIAL

## 2022-02-28 ENCOUNTER — ANCILLARY PROCEDURE (OUTPATIENT)
Dept: ULTRASOUND IMAGING | Facility: HOSPITAL | Age: 32
End: 2022-02-28
Attending: OBSTETRICS & GYNECOLOGY
Payer: COMMERCIAL

## 2022-02-28 DIAGNOSIS — O99.210 OBESITY IN PREGNANCY: Primary | ICD-10-CM

## 2022-02-28 DIAGNOSIS — O99.210 OBESITY IN PREGNANCY: ICD-10-CM

## 2022-02-28 PROCEDURE — 76816 OB US FOLLOW-UP PER FETUS: CPT | Mod: 26 | Performed by: OBSTETRICS & GYNECOLOGY

## 2022-02-28 PROCEDURE — 99207 PR NO CHARGE LOS: CPT | Performed by: OBSTETRICS & GYNECOLOGY

## 2022-02-28 PROCEDURE — 76816 OB US FOLLOW-UP PER FETUS: CPT

## 2022-03-04 ENCOUNTER — PRENATAL OFFICE VISIT (OUTPATIENT)
Dept: FAMILY MEDICINE | Facility: CLINIC | Age: 32
End: 2022-03-04
Payer: COMMERCIAL

## 2022-03-04 VITALS
HEART RATE: 100 BPM | TEMPERATURE: 98 F | SYSTOLIC BLOOD PRESSURE: 120 MMHG | RESPIRATION RATE: 16 BRPM | BODY MASS INDEX: 48.44 KG/M2 | HEIGHT: 62 IN | WEIGHT: 263.25 LBS | DIASTOLIC BLOOD PRESSURE: 68 MMHG | OXYGEN SATURATION: 98 %

## 2022-03-04 DIAGNOSIS — F33.2 SEVERE EPISODE OF RECURRENT MAJOR DEPRESSIVE DISORDER, WITHOUT PSYCHOTIC FEATURES (H): ICD-10-CM

## 2022-03-04 DIAGNOSIS — Z34.90 PREGNANCY, UNSPECIFIED GESTATIONAL AGE: Primary | ICD-10-CM

## 2022-03-04 DIAGNOSIS — F41.1 GENERALIZED ANXIETY DISORDER: ICD-10-CM

## 2022-03-04 DIAGNOSIS — Z87.59 HISTORY OF CHOLESTASIS DURING PREGNANCY: ICD-10-CM

## 2022-03-04 DIAGNOSIS — Z87.19 HISTORY OF CHOLESTASIS DURING PREGNANCY: ICD-10-CM

## 2022-03-04 LAB
GLUCOSE 1H P 50 G GLC PO SERPL-MCNC: 131 MG/DL (ref 70–129)
HGB BLD-MCNC: 10.7 G/DL (ref 11.7–15.7)

## 2022-03-04 PROCEDURE — 99207 PR PRENATAL VISIT: CPT | Performed by: FAMILY MEDICINE

## 2022-03-04 PROCEDURE — 86780 TREPONEMA PALLIDUM: CPT | Performed by: FAMILY MEDICINE

## 2022-03-04 PROCEDURE — 82950 GLUCOSE TEST: CPT | Performed by: FAMILY MEDICINE

## 2022-03-04 PROCEDURE — 85018 HEMOGLOBIN: CPT | Performed by: FAMILY MEDICINE

## 2022-03-04 PROCEDURE — 36415 COLL VENOUS BLD VENIPUNCTURE: CPT | Performed by: FAMILY MEDICINE

## 2022-03-04 NOTE — PROGRESS NOTES
"SUBJECTIVE:   at 26w6d. Estimated Date of Delivery: 2022.  She is doing well. She denies abdominal pain/cramping, vaginal bleeding, leakage of fluid. Fetal movement is present.   Concerns: back pain started a few days ago, midline, low back. Pregnancy support pillow has helped. Nausea resolved. Had growth US with MFM that was normal.  ROS  Negative except as noted above in HPI.  OBJECTIVE:  Blood pressure 120/68, pulse 100, temperature 98  F (36.7  C), temperature source Oral, resp. rate 16, height 1.575 m (5' 2\"), weight 119.4 kg (263 lb 4 oz), last menstrual period 2021, SpO2 98 %.  Gen: comfortable, no acute distress.  See OB Vitals flowsheet.  ASSESSMENT/PLAN:  Luis Antonio was seen today for prenatal care and back pain.    Diagnoses and all orders for this visit:    Pregnancy, unspecified gestational age  -     Glucose tolerance, gest screen, 1 hour; Future  -     Treponema Abs w Reflex to RPR and Titer; Future  -     Hemoglobin; Future  -     Neisseria gonorrhoeae PCR; Future  -     Chlamydia trachomatis PCR; Future    Severe episode of recurrent major depressive disorder, without psychotic features (H)  Generalized anxiety disorder: I called her mental health provider- see previous note for details. Agreed to continue her zyprexa and effexor. Stable mood- feeling better since restarting medications.    History of cholestasis during pregnancy: Denies pruritus.       -IUP at 26w6d:     Prenatal labs reviewed.     RTC in 2 weeks or sooner with problems. Tdap next visit      Elena June MD      "

## 2022-03-05 DIAGNOSIS — D64.9 ANEMIA, UNSPECIFIED TYPE: Primary | ICD-10-CM

## 2022-03-05 LAB — T PALLIDUM AB SER QL: NONREACTIVE

## 2022-03-05 RX ORDER — FERROUS SULFATE 325(65) MG
325 TABLET ORAL
Qty: 60 TABLET | Refills: 3 | Status: SHIPPED | OUTPATIENT
Start: 2022-03-05 | End: 2022-06-30

## 2022-03-11 ENCOUNTER — LAB (OUTPATIENT)
Dept: LAB | Facility: CLINIC | Age: 32
End: 2022-03-11
Payer: COMMERCIAL

## 2022-03-11 DIAGNOSIS — Z34.90 PREGNANCY, UNSPECIFIED GESTATIONAL AGE: ICD-10-CM

## 2022-03-11 LAB
GESTATIONAL GTT 1 HR POST DOSE: 151 MG/DL (ref 60–179)
GESTATIONAL GTT 2 HR POST DOSE: 158 MG/DL (ref 60–154)
GESTATIONAL GTT 3 HR POST DOSE: 91 MG/DL (ref 60–139)
GLUCOSE P FAST SERPL-MCNC: 107 MG/DL (ref 60–94)

## 2022-03-11 PROCEDURE — 82951 GLUCOSE TOLERANCE TEST (GTT): CPT

## 2022-03-11 PROCEDURE — 36415 COLL VENOUS BLD VENIPUNCTURE: CPT

## 2022-03-11 PROCEDURE — 82952 GTT-ADDED SAMPLES: CPT

## 2022-03-14 DIAGNOSIS — O24.419 GESTATIONAL DIABETES MELLITUS (GDM), ANTEPARTUM, GESTATIONAL DIABETES METHOD OF CONTROL UNSPECIFIED: Primary | ICD-10-CM

## 2022-03-14 RX ORDER — LANCETS
EACH MISCELLANEOUS
Qty: 200 EACH | Refills: 11 | Status: SHIPPED | OUTPATIENT
Start: 2022-03-14 | End: 2022-06-30

## 2022-03-14 RX ORDER — GLUCOSAMINE HCL/CHONDROITIN SU 500-400 MG
CAPSULE ORAL
Qty: 100 EACH | Refills: 6 | Status: SHIPPED | OUTPATIENT
Start: 2022-03-14 | End: 2022-06-30

## 2022-03-24 ENCOUNTER — MYC MEDICAL ADVICE (OUTPATIENT)
Dept: FAMILY MEDICINE | Facility: CLINIC | Age: 32
End: 2022-03-24
Payer: COMMERCIAL

## 2022-03-24 NOTE — TELEPHONE ENCOUNTER
Writer responded via [employer] with recommendation.    Lexi PEREZ RN  [facility]

## 2022-03-24 NOTE — TELEPHONE ENCOUNTER
Writer responded via [employer] with follow up questions.    Lexi PEREZ RN  [facility]

## 2022-03-25 ENCOUNTER — PRENATAL OFFICE VISIT (OUTPATIENT)
Dept: FAMILY MEDICINE | Facility: CLINIC | Age: 32
End: 2022-03-25
Payer: COMMERCIAL

## 2022-03-25 VITALS
SYSTOLIC BLOOD PRESSURE: 124 MMHG | HEART RATE: 97 BPM | WEIGHT: 263.5 LBS | HEIGHT: 62 IN | DIASTOLIC BLOOD PRESSURE: 76 MMHG | OXYGEN SATURATION: 97 % | BODY MASS INDEX: 48.49 KG/M2

## 2022-03-25 DIAGNOSIS — O09.90 HIGH-RISK PREGNANCY, UNSPECIFIED TRIMESTER: Primary | ICD-10-CM

## 2022-03-25 DIAGNOSIS — R05.9 COUGH: ICD-10-CM

## 2022-03-25 DIAGNOSIS — Z87.59 HISTORY OF CHOLESTASIS DURING PREGNANCY: ICD-10-CM

## 2022-03-25 DIAGNOSIS — Z87.19 HISTORY OF CHOLESTASIS DURING PREGNANCY: ICD-10-CM

## 2022-03-25 DIAGNOSIS — O24.410 DIET CONTROLLED GESTATIONAL DIABETES MELLITUS (GDM) IN THIRD TRIMESTER: ICD-10-CM

## 2022-03-25 PROCEDURE — 90471 IMMUNIZATION ADMIN: CPT | Performed by: FAMILY MEDICINE

## 2022-03-25 PROCEDURE — 99213 OFFICE O/P EST LOW 20 MIN: CPT | Mod: 25 | Performed by: FAMILY MEDICINE

## 2022-03-25 PROCEDURE — 99207 PR PRENATAL VISIT: CPT | Performed by: FAMILY MEDICINE

## 2022-03-25 PROCEDURE — 90715 TDAP VACCINE 7 YRS/> IM: CPT | Performed by: FAMILY MEDICINE

## 2022-03-25 RX ORDER — ALBUTEROL SULFATE 90 UG/1
1-2 AEROSOL, METERED RESPIRATORY (INHALATION) EVERY 6 HOURS PRN
Qty: 18 G | Refills: 0 | Status: SHIPPED | OUTPATIENT
Start: 2022-03-25 | End: 2022-04-19

## 2022-03-25 NOTE — PROGRESS NOTES
"SUBJECTIVE:   at 29w6d. Estimated Date of Delivery: 2022.  She denies vaginal bleeding, leakage of fluid, or urinary symptoms. Fetal movement is present. She is not having contractions.  Concerns: Has been checking her sugars four times daily. She missed her diabetes education appointment due to not feeling well. She has only had two elevations post-prandial in the last few weeks, otherwise all at goal. She has cut out sugary drinks and cut down on carbohydrate portions. She has been feeling sick with cold symptoms. She took at-home Covid-19 test yesterday that was negative. She has cough, nasal congestion. Has been using tea- she does not like to use too many medications and she was not sure what was considered safe in pregnancy.   ROS  Negative except as noted above in HPI  OBJECTIVE:  Blood pressure 124/76, pulse 97, height 1.575 m (5' 2\"), weight 119.5 kg (263 lb 8 oz), last menstrual period 2021, SpO2 97 %.  Gen: Comfortable, no acute distress.  Abd: Gravid, non-tender  CV: RRR, no m/r/g  Resp: Faint wheeze noted  See OB Vitals flowsheet.  ASSESSMENT/PLAN:  Luis Antonio was seen today for prenatal care.    Diagnoses and all orders for this visit:    High-risk pregnancy, unspecified trimester    History of cholestasis during pregnancy: No symptoms.    Diet controlled gestational diabetes mellitus (GDM) in third trimester: Reviewed option for DM education- she declines at this time. Reviewed dietary and activity recommendations- she has made a lot of these changes already. Only few isolated postprandial elevations, otherwise all sugars at goal. Continue checking four times daily. Continue growth US through MFM due to maternal GDM and obesity.     Cough: Viral URI with cough. Negative rapid covid test at home. Wheezing on exam. No tachypnea, tachycardia, hypoxia or signs of respiratory distress. She notes a history of needing albuterol inhaler previously with a viral illness but no known history of " diagnosed asthma. Likely component of underlying reactive airway disease. Continue supportive cares- reviewed safe OTC medications in pregnancy. Use beta agonist prn to help with cough, wheezing.  -     albuterol (PROAIR HFA/PROVENTIL HFA/VENTOLIN HFA) 108 (90 Base) MCG/ACT inhaler; Inhale 1-2 puffs into the lungs every 6 hours as needed for shortness of breath / dyspnea or wheezing    Other orders  -     TDAP VACCINE (Adacel, Boostrix)  [[id number]]      -IUP at 29w6d:     Prenatal labs reviewed     RTC in 2 weeks or sooner with problems.      Elena June MD

## 2022-03-28 ENCOUNTER — OFFICE VISIT (OUTPATIENT)
Dept: MATERNAL FETAL MEDICINE | Facility: HOSPITAL | Age: 32
End: 2022-03-28
Attending: OBSTETRICS & GYNECOLOGY
Payer: COMMERCIAL

## 2022-03-28 ENCOUNTER — ANCILLARY PROCEDURE (OUTPATIENT)
Dept: ULTRASOUND IMAGING | Facility: HOSPITAL | Age: 32
End: 2022-03-28
Attending: OBSTETRICS & GYNECOLOGY
Payer: COMMERCIAL

## 2022-03-28 DIAGNOSIS — O99.210 OBESITY IN PREGNANCY: ICD-10-CM

## 2022-03-28 DIAGNOSIS — O99.210 OBESITY IN PREGNANCY: Primary | ICD-10-CM

## 2022-03-28 DIAGNOSIS — O24.410 GDM, CLASS A1: ICD-10-CM

## 2022-03-28 PROCEDURE — 99207 PR NO CHARGE LOS: CPT | Performed by: OBSTETRICS & GYNECOLOGY

## 2022-03-28 PROCEDURE — 76816 OB US FOLLOW-UP PER FETUS: CPT | Mod: 26 | Performed by: OBSTETRICS & GYNECOLOGY

## 2022-03-28 PROCEDURE — 76816 OB US FOLLOW-UP PER FETUS: CPT

## 2022-03-28 NOTE — PROGRESS NOTES
"Please see \"Imaging\" tab under Chart Review for full details.    Hollie Fraser MD  Maternal Fetal Medicine    "

## 2022-04-08 ENCOUNTER — PRENATAL OFFICE VISIT (OUTPATIENT)
Dept: FAMILY MEDICINE | Facility: CLINIC | Age: 32
End: 2022-04-08
Payer: COMMERCIAL

## 2022-04-08 VITALS
RESPIRATION RATE: 16 BRPM | HEIGHT: 62 IN | OXYGEN SATURATION: 98 % | WEIGHT: 266 LBS | HEART RATE: 103 BPM | DIASTOLIC BLOOD PRESSURE: 64 MMHG | TEMPERATURE: 98 F | BODY MASS INDEX: 48.95 KG/M2 | SYSTOLIC BLOOD PRESSURE: 102 MMHG

## 2022-04-08 DIAGNOSIS — O24.410 DIET CONTROLLED GESTATIONAL DIABETES MELLITUS (GDM) IN THIRD TRIMESTER: ICD-10-CM

## 2022-04-08 DIAGNOSIS — Z34.90 PREGNANCY, UNSPECIFIED GESTATIONAL AGE: Primary | ICD-10-CM

## 2022-04-08 DIAGNOSIS — Z87.59 HISTORY OF CHOLESTASIS DURING PREGNANCY: ICD-10-CM

## 2022-04-08 DIAGNOSIS — Z87.19 HISTORY OF CHOLESTASIS DURING PREGNANCY: ICD-10-CM

## 2022-04-08 PROCEDURE — 99207 PR PRENATAL VISIT: CPT | Performed by: FAMILY MEDICINE

## 2022-04-08 NOTE — PROGRESS NOTES
"SUBJECTIVE:   at 31w6d. Estimated Date of Delivery: 2022.  She is doing well. Cough improved. She denies vaginal bleeding, leakage of fluid, or urinary symptoms. Fetal movement is present. Denies any pruritus.   Concerns: Interested in tubal ligation postpartum. Fasting sugars 81-92. Postprandials- had 1 elevation within the past week, otherwise all at goal. Growth US with MFM normal.  ROS  Negative except as noted above in HPI  OBJECTIVE:  Blood pressure 102/64, pulse 103, temperature 98  F (36.7  C), temperature source Oral, resp. rate 16, height 1.575 m (5' 2\"), weight 120.7 kg (266 lb), last menstrual period 2021, SpO2 98 %.  Gen: Comfortable, no acute distress.  Abd: Gravid, non-tender  See OB Vitals flowsheet.  ASSESSMENT/PLAN:  Luis Antonio was seen today for prenatal care and derm problem.    Diagnoses and all orders for this visit:    Pregnancy, unspecified gestational age    Diet controlled gestational diabetes mellitus (GDM) in third trimester: Well controlled with lifestyle modifications- only 1 elevation in the past 1 week. Continue to check sugars four times daily. Growth US with MFM due to maternal obesity and GDM have been normal.     History of cholestasis during pregnancy: counseled on warning signs- she will call if she develops any pruritus.      -IUP at 31w6d:     Prenatal labs reviewed     Postpartum contraception: desires tubal ligation- signed consent today    RTC in 2 weeks or sooner with problems. Hemoglobin next visit      Elena June MD    "

## 2022-04-19 DIAGNOSIS — R05.9 COUGH: ICD-10-CM

## 2022-04-19 DIAGNOSIS — Z76.0 ENCOUNTER FOR MEDICATION REFILL: Primary | ICD-10-CM

## 2022-04-22 ENCOUNTER — PRENATAL OFFICE VISIT (OUTPATIENT)
Dept: FAMILY MEDICINE | Facility: CLINIC | Age: 32
End: 2022-04-22
Payer: COMMERCIAL

## 2022-04-22 VITALS
HEART RATE: 87 BPM | BODY MASS INDEX: 48.95 KG/M2 | WEIGHT: 266 LBS | RESPIRATION RATE: 16 BRPM | OXYGEN SATURATION: 98 % | HEIGHT: 62 IN | DIASTOLIC BLOOD PRESSURE: 70 MMHG | SYSTOLIC BLOOD PRESSURE: 108 MMHG

## 2022-04-22 DIAGNOSIS — O24.410 DIET CONTROLLED GESTATIONAL DIABETES MELLITUS (GDM) IN THIRD TRIMESTER: ICD-10-CM

## 2022-04-22 DIAGNOSIS — Z87.19 HISTORY OF CHOLESTASIS DURING PREGNANCY: ICD-10-CM

## 2022-04-22 DIAGNOSIS — Z87.59 HISTORY OF CHOLESTASIS DURING PREGNANCY: ICD-10-CM

## 2022-04-22 DIAGNOSIS — O09.90 HIGH-RISK PREGNANCY, UNSPECIFIED TRIMESTER: Primary | ICD-10-CM

## 2022-04-22 DIAGNOSIS — F41.1 GENERALIZED ANXIETY DISORDER: ICD-10-CM

## 2022-04-22 DIAGNOSIS — F33.2 SEVERE EPISODE OF RECURRENT MAJOR DEPRESSIVE DISORDER, WITHOUT PSYCHOTIC FEATURES (H): ICD-10-CM

## 2022-04-22 DIAGNOSIS — D64.9 ANEMIA, UNSPECIFIED TYPE: ICD-10-CM

## 2022-04-22 LAB — HGB BLD-MCNC: 11.3 G/DL (ref 11.7–15.7)

## 2022-04-22 PROCEDURE — 99207 PR PRENATAL VISIT: CPT | Performed by: FAMILY MEDICINE

## 2022-04-22 PROCEDURE — 36415 COLL VENOUS BLD VENIPUNCTURE: CPT | Performed by: FAMILY MEDICINE

## 2022-04-22 PROCEDURE — 85018 HEMOGLOBIN: CPT | Performed by: FAMILY MEDICINE

## 2022-04-22 NOTE — PROGRESS NOTES
"SUBJECTIVE:   at 33w6d. Estimated Date of Delivery: 2022.  She is doing well. She denies vaginal bleeding, leakage of fluid, or urinary symptoms. Fetal movement is present. She is not having contractions.  Concerns: Denies any severe pruritus. Feels her hands are more swollen/red and numbness in fingers. She continues to see her therapist every 2 weeks. Fasting sugars 75, 92, 83, 90. Denies any elevation in postprandial readings. She has growth US with MFM scheduled 22  ROS  Negative except as noted above in HPI  OBJECTIVE:  Blood pressure 108/70, pulse 87, resp. rate 16, height 1.575 m (5' 2\"), weight 120.7 kg (266 lb), last menstrual period 2021, SpO2 98 %.  Gen: Comfortable, no acute distress.  Abd: Gravid, non-tender  See OB Vitals flowsheet.  ASSESSMENT/PLAN:  Luis Antonio was seen today for prenatal care.    Diagnoses and all orders for this visit:    High-risk pregnancy, unspecified trimester    Anemia, unspecified type: on oral iron- recheck hemoglobin today  -     Hemoglobin; Future  -     Hemoglobin    Severe episode of recurrent major depressive disorder, without psychotic features (H)  Generalized anxiety disorder: follows with therapist q2 weeks and psychiatrist- continue medications.     History of cholestasis during pregnancy: Reviewed signs and to call if she develops any pruritus- she would need labs ASAP.    Diet controlled gestational diabetes mellitus (GDM) in third trimester: All sugars at goal- continue checking four times daily. Continue growth US q4 weeks with MFM.       -IUP at 33w6d:     Prenatal labs reviewed     RTC in 2 weeks or sooner with problems. GBS next visit      Elena June MD    "

## 2022-04-24 RX ORDER — ALBUTEROL SULFATE 90 UG/1
AEROSOL, METERED RESPIRATORY (INHALATION)
Qty: 18 G | Refills: 3 | Status: SHIPPED | OUTPATIENT
Start: 2022-04-24

## 2022-04-28 ENCOUNTER — OFFICE VISIT (OUTPATIENT)
Dept: MATERNAL FETAL MEDICINE | Facility: HOSPITAL | Age: 32
End: 2022-04-28
Attending: OBSTETRICS & GYNECOLOGY
Payer: COMMERCIAL

## 2022-04-28 ENCOUNTER — ANCILLARY PROCEDURE (OUTPATIENT)
Dept: ULTRASOUND IMAGING | Facility: HOSPITAL | Age: 32
End: 2022-04-28
Attending: OBSTETRICS & GYNECOLOGY
Payer: COMMERCIAL

## 2022-04-28 DIAGNOSIS — O99.210 OBESITY IN PREGNANCY: ICD-10-CM

## 2022-04-28 DIAGNOSIS — O24.410 GDM, CLASS A1: ICD-10-CM

## 2022-04-28 DIAGNOSIS — O99.210 OBESITY IN PREGNANCY: Primary | ICD-10-CM

## 2022-04-28 PROCEDURE — 76816 OB US FOLLOW-UP PER FETUS: CPT

## 2022-04-28 PROCEDURE — 76816 OB US FOLLOW-UP PER FETUS: CPT | Mod: 26 | Performed by: OBSTETRICS & GYNECOLOGY

## 2022-04-28 PROCEDURE — 99207 PR NO CHARGE LOS: CPT | Performed by: OBSTETRICS & GYNECOLOGY

## 2022-04-29 ENCOUNTER — PRENATAL OFFICE VISIT (OUTPATIENT)
Dept: FAMILY MEDICINE | Facility: CLINIC | Age: 32
End: 2022-04-29
Payer: COMMERCIAL

## 2022-04-29 VITALS
OXYGEN SATURATION: 97 % | TEMPERATURE: 98.8 F | SYSTOLIC BLOOD PRESSURE: 110 MMHG | DIASTOLIC BLOOD PRESSURE: 60 MMHG | WEIGHT: 266 LBS | RESPIRATION RATE: 20 BRPM | BODY MASS INDEX: 48.95 KG/M2 | HEIGHT: 62 IN | HEART RATE: 94 BPM

## 2022-04-29 DIAGNOSIS — O24.410 DIET CONTROLLED GESTATIONAL DIABETES MELLITUS (GDM) IN THIRD TRIMESTER: ICD-10-CM

## 2022-04-29 DIAGNOSIS — Z87.19 HISTORY OF CHOLESTASIS DURING PREGNANCY: ICD-10-CM

## 2022-04-29 DIAGNOSIS — Z87.59 HISTORY OF CHOLESTASIS DURING PREGNANCY: ICD-10-CM

## 2022-04-29 DIAGNOSIS — Z34.90 PREGNANCY, UNSPECIFIED GESTATIONAL AGE: Primary | ICD-10-CM

## 2022-04-29 DIAGNOSIS — E66.01 MORBID OBESITY (H): ICD-10-CM

## 2022-04-29 PROBLEM — F10.10 ALCOHOL ABUSE, EPISODIC DRINKING BEHAVIOR: Status: RESOLVED | Noted: 2019-04-24 | Resolved: 2022-04-29

## 2022-04-29 PROCEDURE — 99207 PR PRENATAL VISIT: CPT | Performed by: FAMILY MEDICINE

## 2022-04-29 PROCEDURE — 87653 STREP B DNA AMP PROBE: CPT | Performed by: FAMILY MEDICINE

## 2022-04-29 NOTE — PROGRESS NOTES
"SUBJECTIVE:   at 34w6d. Estimated Date of Delivery: 2022.  She is doing well. Fetal movement is present. She is not having contractions.  Concerns: Fasting and postprandial sugars all at goal. Fasting 82 this morning. No elevations. Has started weekly BPP's with MFM due to maternal obesity. Denies any itching.   ROS  Negative except as noted above in HPI  OBJECTIVE:  Blood pressure 110/60, pulse 94, temperature 98.8  F (37.1  C), temperature source Oral, resp. rate 20, height 1.575 m (5' 2\"), weight 120.7 kg (266 lb), last menstrual period 2021, SpO2 97 %.  Gen: Comfortable, no acute distress.  Abd: Gravid, non-tender  See OB Vitals flowsheet.  ASSESSMENT/PLAN:  Luis Antonio was seen today for prenatal care.    Diagnoses and all orders for this visit:    Pregnancy, unspecified gestational age  -     Cancel: Group B strep PCR  -     Group B strep PCR    Morbid obesity (H): continue with growth US/weekly BPP's with MFM. Of note, baby was transverse on last US (previously vertex)- will continue to monitor- if remains transverse, would consider referral to OB-GYN for evaluation for ECV vs C/S    History of cholestasis during pregnancy: She will call with any pruritus symptoms. If after 36 weeks, would consider induction.     Diet controlled gestational diabetes mellitus (GDM) in third trimester: Continue monitoring blood sugars four times daily- all at goal per patient report. Growth US with MFM show normal interval growth.      -IUP at 34w6d:     Prenatal labs reviewed    Fetal survey was done    Post-partum Contraception: tubal ligation, consent signed.     RTC in 1 weeks or sooner with problems. Review birth plan/pain management options.      Elena June MD    "

## 2022-04-30 LAB — GP B STREP DNA SPEC QL NAA+PROBE: NEGATIVE

## 2022-05-05 ENCOUNTER — OFFICE VISIT (OUTPATIENT)
Dept: MATERNAL FETAL MEDICINE | Facility: HOSPITAL | Age: 32
End: 2022-05-05
Attending: OBSTETRICS & GYNECOLOGY
Payer: COMMERCIAL

## 2022-05-05 ENCOUNTER — ANCILLARY PROCEDURE (OUTPATIENT)
Dept: ULTRASOUND IMAGING | Facility: HOSPITAL | Age: 32
End: 2022-05-05
Attending: OBSTETRICS & GYNECOLOGY
Payer: COMMERCIAL

## 2022-05-05 DIAGNOSIS — O99.210 OBESITY IN PREGNANCY: ICD-10-CM

## 2022-05-05 DIAGNOSIS — O36.8390 FETAL TACHYCARDIA AFFECTING MANAGEMENT OF MOTHER: ICD-10-CM

## 2022-05-05 DIAGNOSIS — O99.210 OBESITY IN PREGNANCY: Primary | ICD-10-CM

## 2022-05-05 PROCEDURE — 76818 FETAL BIOPHYS PROFILE W/NST: CPT | Mod: 26 | Performed by: OBSTETRICS & GYNECOLOGY

## 2022-05-05 PROCEDURE — 76819 FETAL BIOPHYS PROFIL W/O NST: CPT

## 2022-05-05 PROCEDURE — 59025 FETAL NON-STRESS TEST: CPT | Mod: 59

## 2022-05-05 PROCEDURE — 99207 PR NO CHARGE LOS: CPT | Performed by: OBSTETRICS & GYNECOLOGY

## 2022-05-12 ENCOUNTER — ANCILLARY PROCEDURE (OUTPATIENT)
Dept: ULTRASOUND IMAGING | Facility: HOSPITAL | Age: 32
End: 2022-05-12
Attending: OBSTETRICS & GYNECOLOGY
Payer: COMMERCIAL

## 2022-05-12 ENCOUNTER — OFFICE VISIT (OUTPATIENT)
Dept: MATERNAL FETAL MEDICINE | Facility: HOSPITAL | Age: 32
End: 2022-05-12
Attending: OBSTETRICS & GYNECOLOGY
Payer: COMMERCIAL

## 2022-05-12 DIAGNOSIS — O99.210 OBESITY IN PREGNANCY: Primary | ICD-10-CM

## 2022-05-12 DIAGNOSIS — O99.210 OBESITY IN PREGNANCY: ICD-10-CM

## 2022-05-12 PROCEDURE — 76819 FETAL BIOPHYS PROFIL W/O NST: CPT | Mod: 26 | Performed by: OBSTETRICS & GYNECOLOGY

## 2022-05-12 PROCEDURE — 76819 FETAL BIOPHYS PROFIL W/O NST: CPT

## 2022-05-12 PROCEDURE — 99207 PR NO CHARGE LOS: CPT | Performed by: OBSTETRICS & GYNECOLOGY

## 2022-05-13 ENCOUNTER — PRENATAL OFFICE VISIT (OUTPATIENT)
Dept: FAMILY MEDICINE | Facility: CLINIC | Age: 32
End: 2022-05-13
Payer: COMMERCIAL

## 2022-05-13 VITALS
DIASTOLIC BLOOD PRESSURE: 74 MMHG | RESPIRATION RATE: 16 BRPM | SYSTOLIC BLOOD PRESSURE: 110 MMHG | HEART RATE: 97 BPM | BODY MASS INDEX: 49.13 KG/M2 | WEIGHT: 267 LBS | OXYGEN SATURATION: 99 % | HEIGHT: 62 IN

## 2022-05-13 DIAGNOSIS — Z34.90 PREGNANCY, UNSPECIFIED GESTATIONAL AGE: Primary | ICD-10-CM

## 2022-05-13 DIAGNOSIS — Z87.59 HISTORY OF CHOLESTASIS DURING PREGNANCY: ICD-10-CM

## 2022-05-13 DIAGNOSIS — O24.410 DIET CONTROLLED GESTATIONAL DIABETES MELLITUS (GDM) IN THIRD TRIMESTER: ICD-10-CM

## 2022-05-13 DIAGNOSIS — F33.2 SEVERE EPISODE OF RECURRENT MAJOR DEPRESSIVE DISORDER, WITHOUT PSYCHOTIC FEATURES (H): ICD-10-CM

## 2022-05-13 DIAGNOSIS — Z87.19 HISTORY OF CHOLESTASIS DURING PREGNANCY: ICD-10-CM

## 2022-05-13 PROCEDURE — 59426 ANTEPARTUM CARE ONLY: CPT | Performed by: FAMILY MEDICINE

## 2022-05-15 NOTE — PROGRESS NOTES
"SUBJECTIVE:  [id number]  at 37w0d. Estimated Date of Delivery: 2022.  She denies vaginal bleeding, leakage of fluid, or urinary symptoms. Fetal movement is present.   Concerns: Denies any pruritus. Mood is stable. Missed appointment last week due to her family having viral gastroenteritis- she had symptoms but resolved after a few days. Continues to check her sugars regularly and has not had any elevations. Continue with weekly BPP's through MFM.   ROS  Negative except as noted above in HPI  OBJECTIVE:  Blood pressure 110/74, pulse 97, resp. rate 16, height 1.575 m (5' 2\"), weight 121.1 kg (267 lb), last menstrual period 2021, SpO2 99 %.  Gen: Comfortable, no acute distress.  Abd: Gravid, non-tender  See OB Vitals flowsheet.  ASSESSMENT/PLAN:  Luis Antonio was seen today for prenatal care.    Diagnoses and all orders for this visit:    Pregnancy, unspecified gestational age    Diet controlled gestational diabetes mellitus (GDM) in third trimester: Sugars all at goal- continue growth US and weekly BPP through MFM. Cephalic presentation this week.    Severe episode of recurrent major depressive disorder, without psychotic features (H): Stable, continue medications.     History of cholestasis during pregnancy: Denies pruritus- aware of symptoms and will contact clinic immediately if she has any worsening pruritus.       -IUP at 37w0d:     Prenatal labs reviewed     RTC in 1 weeks or sooner with problems.      Elena June MD    "

## 2022-05-16 ENCOUNTER — NURSE TRIAGE (OUTPATIENT)
Dept: NURSING | Facility: CLINIC | Age: 32
End: 2022-05-16
Payer: COMMERCIAL

## 2022-05-16 ENCOUNTER — MYC MEDICAL ADVICE (OUTPATIENT)
Dept: FAMILY MEDICINE | Facility: CLINIC | Age: 32
End: 2022-05-16
Payer: COMMERCIAL

## 2022-05-16 DIAGNOSIS — O99.713 PRURITUS OF PREGNANCY IN THIRD TRIMESTER: Primary | ICD-10-CM

## 2022-05-16 DIAGNOSIS — L29.9 PRURITUS OF PREGNANCY IN THIRD TRIMESTER: Primary | ICD-10-CM

## 2022-05-16 NOTE — TELEPHONE ENCOUNTER
37w2d  Started experiencing intense itching in hands and feet.  Having a little nausea also.  Started 5/14/22.  Hx of cholestasis in two prior pregnancies    Will route to OB provider/sofia, Dr Slade Coreas  Patient sent [employer] message in prior to calling.         Yolanda CAR RN [facility]

## 2022-05-18 ENCOUNTER — ANESTHESIA EVENT (OUTPATIENT)
Dept: OBGYN | Facility: HOSPITAL | Age: 32
End: 2022-05-18
Payer: COMMERCIAL

## 2022-05-18 ENCOUNTER — ANESTHESIA (OUTPATIENT)
Dept: OBGYN | Facility: HOSPITAL | Age: 32
End: 2022-05-18
Payer: COMMERCIAL

## 2022-05-18 ENCOUNTER — HOSPITAL ENCOUNTER (INPATIENT)
Facility: HOSPITAL | Age: 32
LOS: 3 days | Discharge: HOME HEALTH | End: 2022-05-21
Attending: OBSTETRICS & GYNECOLOGY | Admitting: FAMILY MEDICINE
Payer: COMMERCIAL

## 2022-05-18 DIAGNOSIS — Z98.891 STATUS POST PRIMARY LOW TRANSVERSE CESAREAN SECTION: ICD-10-CM

## 2022-05-18 PROBLEM — Z34.90 ENCOUNTER FOR INDUCTION OF LABOR: Status: ACTIVE | Noted: 2022-05-18

## 2022-05-18 LAB
ABO/RH(D): NORMAL
ALBUMIN SERPL-MCNC: 2 G/DL (ref 3.5–5)
ALP SERPL-CCNC: 167 U/L (ref 45–120)
ALT SERPL W P-5'-P-CCNC: 107 U/L (ref 0–45)
ANION GAP SERPL CALCULATED.3IONS-SCNC: 8 MMOL/L (ref 5–18)
ANTIBODY SCREEN: NEGATIVE
AST SERPL W P-5'-P-CCNC: 55 U/L (ref 0–40)
BILIRUB SERPL-MCNC: 0.6 MG/DL (ref 0–1)
BUN SERPL-MCNC: 7 MG/DL (ref 8–22)
CALCIUM SERPL-MCNC: 8.1 MG/DL (ref 8.5–10.5)
CHLORIDE BLD-SCNC: 106 MMOL/L (ref 98–107)
CO2 SERPL-SCNC: 21 MMOL/L (ref 22–31)
CREAT SERPL-MCNC: 0.54 MG/DL (ref 0.6–1.1)
ERYTHROCYTE [DISTWIDTH] IN BLOOD BY AUTOMATED COUNT: 12.6 % (ref 10–15)
GFR SERPL CREATININE-BSD FRML MDRD: >90 ML/MIN/1.73M2
GLUCOSE BLD-MCNC: 86 MG/DL (ref 70–125)
GLUCOSE BLDC GLUCOMTR-MCNC: 77 MG/DL (ref 70–99)
GLUCOSE BLDC GLUCOMTR-MCNC: 94 MG/DL (ref 70–99)
HBA1C MFR BLD: 5.3 %
HCT VFR BLD AUTO: 33.3 % (ref 35–47)
HGB BLD-MCNC: 11.4 G/DL (ref 11.7–15.7)
MCH RBC QN AUTO: 30.2 PG (ref 26.5–33)
MCHC RBC AUTO-ENTMCNC: 34.2 G/DL (ref 31.5–36.5)
MCV RBC AUTO: 88 FL (ref 78–100)
PLATELET # BLD AUTO: 316 10E3/UL (ref 150–450)
POTASSIUM BLD-SCNC: 3.6 MMOL/L (ref 3.5–5)
PROT SERPL-MCNC: 6.8 G/DL (ref 6–8)
RBC # BLD AUTO: 3.78 10E6/UL (ref 3.8–5.2)
SARS-COV-2 RNA RESP QL NAA+PROBE: POSITIVE
SODIUM SERPL-SCNC: 135 MMOL/L (ref 136–145)
SPECIMEN EXPIRATION DATE: NORMAL
WBC # BLD AUTO: 9.9 10E3/UL (ref 4–11)

## 2022-05-18 PROCEDURE — 80053 COMPREHEN METABOLIC PANEL: CPT | Performed by: FAMILY MEDICINE

## 2022-05-18 PROCEDURE — 86780 TREPONEMA PALLIDUM: CPT | Performed by: FAMILY MEDICINE

## 2022-05-18 PROCEDURE — 87635 SARS-COV-2 COVID-19 AMP PRB: CPT | Performed by: FAMILY MEDICINE

## 2022-05-18 PROCEDURE — 250N000011 HC RX IP 250 OP 636: Performed by: ANESTHESIOLOGY

## 2022-05-18 PROCEDURE — 36415 COLL VENOUS BLD VENIPUNCTURE: CPT | Performed by: FAMILY MEDICINE

## 2022-05-18 PROCEDURE — 85014 HEMATOCRIT: CPT | Performed by: FAMILY MEDICINE

## 2022-05-18 PROCEDURE — 83036 HEMOGLOBIN GLYCOSYLATED A1C: CPT | Performed by: FAMILY MEDICINE

## 2022-05-18 PROCEDURE — 120N000001 HC R&B MED SURG/OB

## 2022-05-18 PROCEDURE — 258N000003 HC RX IP 258 OP 636: Performed by: FAMILY MEDICINE

## 2022-05-18 PROCEDURE — 250N000009 HC RX 250: Performed by: FAMILY MEDICINE

## 2022-05-18 PROCEDURE — 82239 BILE ACIDS TOTAL: CPT | Performed by: FAMILY MEDICINE

## 2022-05-18 PROCEDURE — 250N000009 HC RX 250: Performed by: ANESTHESIOLOGY

## 2022-05-18 PROCEDURE — 86850 RBC ANTIBODY SCREEN: CPT | Performed by: FAMILY MEDICINE

## 2022-05-18 RX ORDER — NALOXONE HYDROCHLORIDE 0.4 MG/ML
0.2 INJECTION, SOLUTION INTRAMUSCULAR; INTRAVENOUS; SUBCUTANEOUS
Status: DISCONTINUED | OUTPATIENT
Start: 2022-05-18 | End: 2022-05-19

## 2022-05-18 RX ORDER — NALOXONE HYDROCHLORIDE 0.4 MG/ML
0.4 INJECTION, SOLUTION INTRAMUSCULAR; INTRAVENOUS; SUBCUTANEOUS
Status: DISCONTINUED | OUTPATIENT
Start: 2022-05-18 | End: 2022-05-19

## 2022-05-18 RX ORDER — BUPIVACAINE HYDROCHLORIDE 2.5 MG/ML
INJECTION, SOLUTION EPIDURAL; INFILTRATION; INTRACAUDAL PRN
Status: DISCONTINUED | OUTPATIENT
Start: 2022-05-18 | End: 2022-05-19

## 2022-05-18 RX ORDER — PROCHLORPERAZINE MALEATE 10 MG
10 TABLET ORAL EVERY 6 HOURS PRN
Status: DISCONTINUED | OUTPATIENT
Start: 2022-05-18 | End: 2022-05-19

## 2022-05-18 RX ORDER — DIPHENHYDRAMINE HYDROCHLORIDE 50 MG/ML
25 INJECTION INTRAMUSCULAR; INTRAVENOUS EVERY 6 HOURS PRN
Status: DISCONTINUED | OUTPATIENT
Start: 2022-05-18 | End: 2022-05-19

## 2022-05-18 RX ORDER — FENTANYL CITRATE-0.9 % NACL/PF 10 MCG/ML
100 PLASTIC BAG, INJECTION (ML) INTRAVENOUS EVERY 5 MIN PRN
Status: DISCONTINUED | OUTPATIENT
Start: 2022-05-18 | End: 2022-05-19

## 2022-05-18 RX ORDER — IBUPROFEN 800 MG/1
800 TABLET, FILM COATED ORAL
Status: DISCONTINUED | OUTPATIENT
Start: 2022-05-18 | End: 2022-05-19

## 2022-05-18 RX ORDER — NICOTINE POLACRILEX 4 MG
15-30 LOZENGE BUCCAL
Status: DISCONTINUED | OUTPATIENT
Start: 2022-05-18 | End: 2022-05-19

## 2022-05-18 RX ORDER — PROCHLORPERAZINE 25 MG
25 SUPPOSITORY, RECTAL RECTAL EVERY 12 HOURS PRN
Status: DISCONTINUED | OUTPATIENT
Start: 2022-05-18 | End: 2022-05-19

## 2022-05-18 RX ORDER — DEXTROSE MONOHYDRATE 25 G/50ML
25-50 INJECTION, SOLUTION INTRAVENOUS
Status: DISCONTINUED | OUTPATIENT
Start: 2022-05-18 | End: 2022-05-19

## 2022-05-18 RX ORDER — FENTANYL CITRATE 50 UG/ML
100 INJECTION, SOLUTION INTRAMUSCULAR; INTRAVENOUS
Status: DISCONTINUED | OUTPATIENT
Start: 2022-05-18 | End: 2022-05-19

## 2022-05-18 RX ORDER — OLANZAPINE 10 MG/1
10 TABLET ORAL AT BEDTIME
COMMUNITY

## 2022-05-18 RX ORDER — KETOROLAC TROMETHAMINE 30 MG/ML
30 INJECTION, SOLUTION INTRAMUSCULAR; INTRAVENOUS
Status: DISCONTINUED | OUTPATIENT
Start: 2022-05-18 | End: 2022-05-19 | Stop reason: RX

## 2022-05-18 RX ORDER — ONDANSETRON 4 MG/1
4 TABLET, ORALLY DISINTEGRATING ORAL EVERY 6 HOURS PRN
Status: DISCONTINUED | OUTPATIENT
Start: 2022-05-18 | End: 2022-05-19

## 2022-05-18 RX ORDER — CARBOPROST TROMETHAMINE 250 UG/ML
250 INJECTION, SOLUTION INTRAMUSCULAR
Status: DISCONTINUED | OUTPATIENT
Start: 2022-05-18 | End: 2022-05-19

## 2022-05-18 RX ORDER — ONDANSETRON 2 MG/ML
4 INJECTION INTRAMUSCULAR; INTRAVENOUS EVERY 6 HOURS PRN
Status: DISCONTINUED | OUTPATIENT
Start: 2022-05-18 | End: 2022-05-19

## 2022-05-18 RX ORDER — NICOTINE POLACRILEX 4 MG
15-30 LOZENGE BUCCAL
Status: DISCONTINUED | OUTPATIENT
Start: 2022-05-18 | End: 2022-05-18 | Stop reason: HOSPADM

## 2022-05-18 RX ORDER — LIDOCAINE HYDROCHLORIDE AND EPINEPHRINE 15; 5 MG/ML; UG/ML
INJECTION, SOLUTION EPIDURAL PRN
Status: DISCONTINUED | OUTPATIENT
Start: 2022-05-18 | End: 2022-05-19

## 2022-05-18 RX ORDER — OXYTOCIN 10 [USP'U]/ML
10 INJECTION, SOLUTION INTRAMUSCULAR; INTRAVENOUS
Status: DISCONTINUED | OUTPATIENT
Start: 2022-05-18 | End: 2022-05-19

## 2022-05-18 RX ORDER — DIPHENHYDRAMINE HCL 25 MG
25 CAPSULE ORAL EVERY 6 HOURS PRN
Status: DISCONTINUED | OUTPATIENT
Start: 2022-05-18 | End: 2022-05-19

## 2022-05-18 RX ORDER — OXYTOCIN/0.9 % SODIUM CHLORIDE 30/500 ML
340 PLASTIC BAG, INJECTION (ML) INTRAVENOUS CONTINUOUS PRN
Status: DISCONTINUED | OUTPATIENT
Start: 2022-05-18 | End: 2022-05-19

## 2022-05-18 RX ORDER — HYDROXYZINE HYDROCHLORIDE 25 MG/1
25 TABLET, FILM COATED ORAL EVERY 6 HOURS PRN
Status: DISCONTINUED | OUTPATIENT
Start: 2022-05-18 | End: 2022-05-21 | Stop reason: HOSPADM

## 2022-05-18 RX ORDER — KETOROLAC TROMETHAMINE 30 MG/ML
30 INJECTION, SOLUTION INTRAMUSCULAR; INTRAVENOUS
Status: DISCONTINUED | OUTPATIENT
Start: 2022-05-18 | End: 2022-05-19

## 2022-05-18 RX ORDER — VENLAFAXINE 75 MG/1
75 TABLET ORAL 3 TIMES DAILY
COMMUNITY

## 2022-05-18 RX ORDER — METOCLOPRAMIDE HYDROCHLORIDE 5 MG/ML
10 INJECTION INTRAMUSCULAR; INTRAVENOUS EVERY 6 HOURS PRN
Status: DISCONTINUED | OUTPATIENT
Start: 2022-05-18 | End: 2022-05-19

## 2022-05-18 RX ORDER — OXYTOCIN/0.9 % SODIUM CHLORIDE 30/500 ML
100-340 PLASTIC BAG, INJECTION (ML) INTRAVENOUS CONTINUOUS PRN
Status: DISCONTINUED | OUTPATIENT
Start: 2022-05-18 | End: 2022-05-19

## 2022-05-18 RX ORDER — MISOPROSTOL 200 UG/1
800 TABLET ORAL
Status: DISCONTINUED | OUTPATIENT
Start: 2022-05-18 | End: 2022-05-19

## 2022-05-18 RX ORDER — SODIUM CHLORIDE, SODIUM LACTATE, POTASSIUM CHLORIDE, CALCIUM CHLORIDE 600; 310; 30; 20 MG/100ML; MG/100ML; MG/100ML; MG/100ML
INJECTION, SOLUTION INTRAVENOUS CONTINUOUS PRN
Status: DISCONTINUED | OUTPATIENT
Start: 2022-05-18 | End: 2022-05-19

## 2022-05-18 RX ORDER — METOCLOPRAMIDE 10 MG/1
10 TABLET ORAL EVERY 6 HOURS PRN
Status: DISCONTINUED | OUTPATIENT
Start: 2022-05-18 | End: 2022-05-19

## 2022-05-18 RX ORDER — MISOPROSTOL 200 UG/1
400 TABLET ORAL
Status: DISCONTINUED | OUTPATIENT
Start: 2022-05-18 | End: 2022-05-19

## 2022-05-18 RX ORDER — METHYLERGONOVINE MALEATE 0.2 MG/ML
200 INJECTION INTRAVENOUS
Status: DISCONTINUED | OUTPATIENT
Start: 2022-05-18 | End: 2022-05-19

## 2022-05-18 RX ORDER — OXYTOCIN/0.9 % SODIUM CHLORIDE 30/500 ML
1-24 PLASTIC BAG, INJECTION (ML) INTRAVENOUS CONTINUOUS
Status: DISCONTINUED | OUTPATIENT
Start: 2022-05-18 | End: 2022-05-19

## 2022-05-18 RX ORDER — LIDOCAINE 40 MG/G
CREAM TOPICAL
Status: DISCONTINUED | OUTPATIENT
Start: 2022-05-18 | End: 2022-05-19

## 2022-05-18 RX ORDER — CITRIC ACID/SODIUM CITRATE 334-500MG
30 SOLUTION, ORAL ORAL
Status: DISCONTINUED | OUTPATIENT
Start: 2022-05-18 | End: 2022-05-19

## 2022-05-18 RX ORDER — DEXTROSE MONOHYDRATE 25 G/50ML
25-50 INJECTION, SOLUTION INTRAVENOUS
Status: DISCONTINUED | OUTPATIENT
Start: 2022-05-18 | End: 2022-05-18 | Stop reason: HOSPADM

## 2022-05-18 RX ADMIN — Medication: at 22:13

## 2022-05-18 RX ADMIN — Medication 2 MILLI-UNITS/MIN: at 14:21

## 2022-05-18 RX ADMIN — SODIUM CHLORIDE, POTASSIUM CHLORIDE, SODIUM LACTATE AND CALCIUM CHLORIDE: 600; 310; 30; 20 INJECTION, SOLUTION INTRAVENOUS at 14:21

## 2022-05-18 RX ADMIN — LIDOCAINE HYDROCHLORIDE,EPINEPHRINE BITARTRATE 5 ML: 15; .005 INJECTION, SOLUTION EPIDURAL; INFILTRATION; INTRACAUDAL; PERINEURAL at 22:10

## 2022-05-18 RX ADMIN — BUPIVACAINE HYDROCHLORIDE 7 ML: 2.5 INJECTION, SOLUTION EPIDURAL; INFILTRATION; INTRACAUDAL at 22:14

## 2022-05-18 ASSESSMENT — ACTIVITIES OF DAILY LIVING (ADL)
WALKING_OR_CLIMBING_STAIRS_DIFFICULTY: NO
FALL_HISTORY_WITHIN_LAST_SIX_MONTHS: NO
VISION_MANAGEMENT: GLASSES
DIFFICULTY_EATING/SWALLOWING: NO
CONCENTRATING,_REMEMBERING_OR_MAKING_DECISIONS_DIFFICULTY: NO
DRESSING/BATHING_DIFFICULTY: NO
TOILETING_ISSUES: NO
WEAR_GLASSES_OR_BLIND: YES
CHANGE_IN_FUNCTIONAL_STATUS_SINCE_ONSET_OF_CURRENT_ILLNESS/INJURY: NO
HEARING_DIFFICULTY_OR_DEAF: NO
DIFFICULTY_COMMUNICATING: NO
DOING_ERRANDS_INDEPENDENTLY_DIFFICULTY: NO

## 2022-05-18 NOTE — TELEPHONE ENCOUNTER
Provider Response to 2nd Level Triage Request    I have reviewed the RN documentation. My recommendation is:  Face To Face Visit. Same Day: work patient in on my schedule as an overbook.     I called [facility] back but did not get an answer. I will send her a [employer] message. Would recommend induction due to presumed cholestasis. Will order labs for LFTs and bile acids as well.     There was a delay in this message getting to a provider for some reason and Dr. Baker did not see it until last night, then sent it to me this AM.     Hien Jimenez MD

## 2022-05-18 NOTE — PROGRESS NOTES
Called and updated Dr. June on patient's labor status. Patient at 4 mu pit, niko every 3-4 min, palpating mild, pt rating them 3/10. Difficulty tracing FHT, but reactive when able to. Clarified orders for GDM and diet. Will continue to update throughout the night.

## 2022-05-18 NOTE — PLAN OF CARE
Problem: Delayed Labor Progression (Labor)  Goal: Effective Progression to Delivery  Outcome: Ongoing, Progressing   Patient is progressing well. VSS and afebrile. Had difficulty tracing FHT, switched to Valentine which is working much better. Continuing with titration of pitocin. Patient aware and in agreement with plan. She is planning on having a labor epidural once she feels more uncomfortable. Will continue to monitor.

## 2022-05-18 NOTE — H&P
Maternal Admission H&P  [facility]  Date of Admission: 2022  Date of Service: 2022    Name      Luis Antonio Laura         1990  MRN       [id number]  PCP        Dr. Slade SALDANA [facility], [phone].    ________________________________________________________________________    Assessment and Plan:  31 year old  at 37w4d.    History of cholestasis of pregnancy with two of two previous pregnancies now presenting with symptoms of palm and sole pruritis.  Now with some liver enzyme elevation.    GDM A1  Sugars have been controlled.    Will consult with OBGYN.  I think we are likely to recommend delivery even in advance of bile acid results.  Will need cervical ripening.    She has requested a PPTL.  Will confirm that to be her wish.  ________________________________________________________________________    Chief Complaint: itching hands    HPI: Luis Antonio Laura is a 31 year old woman at 37w4d, based on a LMP consistent with 7-week ultrasound.      Estimated Date of Delivery: 2022.     She presents with concerns of increasing itching and her body including most significantly in her palms.  She has had cholestasis with 2 previous pregnancies.  She was induced at 36 weeks with both of those.    Itching started about 5 days ago and has been progressively worsening.  She has also had some concerns of contractions and may be some white vaginal discharge.  There is been some potential blood spotting as well.    Denies jaundice.  Has no right upper quadrant pain.    Patient with history of diet-controlled gestational diabetes.  At her most recent clinic visit it was noted that she did not have any increased blood sugars.    She was getting weekly biophysical profile ultrasounds.  The most recent had normal amniotic fluid with MVP of 7.1 and BPP score of 8 for 8.    Growth ultrasound was done on .  Estimated fetal weight at  48th percentile.  AC at 74th percentile.      Patient Active Problem List    Diagnosis Date Noted     Diet controlled gestational diabetes mellitus (GDM) in third trimester 2022     Priority: Medium     Severe episode of recurrent major depressive disorder (H) 2019     Priority: Medium     Persistent insomnia 2019     Priority: Medium     Generalized anxiety disorder 2019     Priority: Medium     History of cholestasis during pregnancy 10/12/2018     Priority: Medium     Need for hepatitis B vaccination 2018     Priority: Medium     Screening for malignant neoplasm of cervix 2014     Priority: Medium     Overview:    NILM   NILM  27 y.o.   Plan: Co-test 2020         Hidradenitis suppurativa 2013     Priority: Medium     Morbid obesity (H) 2013     Priority: Medium      OB History    Para Term  AB Living   3 2 0 2 0 2   SAB IAB Ectopic Multiple Live Births   0 0 0 0 2      # Outcome Date GA Lbr Burak/2nd Weight Sex Delivery Anes PTL Lv   3 Current            2  10/16/18 36w6d 02:07 / 00:18 2.41 kg (5 lb 5 oz) M Vag-Spont  N PORSHA      Name: Zeb      Apgar1: 9  Apgar5: 9   1  07/05/10 36w2d  2.353 kg (5 lb 3 oz) M Vag-Spont   PORSHA      Name: Sadiq     Review of Systems Negative except what is noted in HPI.     Past Medical History:   Diagnosis Date     Cholelithiases      Depressive disorder      Obesity       Past Surgical History:   Procedure Laterality Date     CHOLECYSTECTOMY       INSERT INTRAUTERINE DEVICE       WISDOM TOOTH EXTRACTION     Penicillins and Sulfa drugs  Family History   Problem Relation Age of Onset     Multiple Sclerosis Mother      Hypertension Maternal Grandmother      Diabetes Maternal Grandmother      Heart Disease Maternal Grandmother      Hypertension Paternal Grandmother      Social History     Socioeconomic History     Marital status:      Spouse name: Not on file     Number of children: Not on  file     Years of education: Not on file     Highest education level: Not on file   Occupational History     Not on file   Tobacco Use     Smoking status: Former Smoker     Years: 7.00     Types: Vaping Device     Quit date: 9/10/2020     Years since quittin.6     Smokeless tobacco: Never Used   Substance and Sexual Activity     Alcohol use: Yes     Alcohol/week: 14.0 standard drinks     Comment: prior to pregnacy maybe 2-3 times a week     Drug use: Not Currently     Types: Marijuana     Comment: stopped a few months prior to get pregnancy     Sexual activity: Yes     Partners: Male     Comment: single   Other Topics Concern     Not on file   Social History Narrative     Not on file     Social Determinants of Health     Financial Resource Strain: Not on file   Food Insecurity: Not on file   Transportation Needs: Not on file   Physical Activity: Not on file   Stress: Not on file   Social Connections: Not on file   Intimate Partner Violence: Not on file   Housing Stability: Not on file     Prior to Admission Medication List  Medications Prior to Admission   Medication Sig Dispense Refill Last Dose     alcohol swab prep pads Use to swab area of injection/stephen as directed. 100 each 6 2022 at 0900     blood glucose (NO BRAND SPECIFIED) test strip Use to test blood sugar 4 times daily or as directed. To accompany: Blood Glucose Monitor Brands: per insurance. 100 strip 6 2022 at 0900     blood glucose monitoring (NO BRAND SPECIFIED) meter device kit Use to test blood sugar 4 times daily or as directed. Preferred blood glucose meter OR supplies to accompany: Blood Glucose Monitor Brands: per insurance. 1 kit 0 2022 at 0900     ferrous sulfate (FEROSUL) 325 (65 Fe) MG tablet Take 1 tablet (325 mg) by mouth daily (with breakfast) 60 tablet 3 2022 at 0900     OLANZapine (ZYPREXA) 10 MG tablet Take 10 mg by mouth At Bedtime   2022 at 2100     Prenatal Multivit-Min-Fe-FA (PRENATAL, W/IRON & FA,)  "27-0.8 MG TABS Take 1 tablet by mouth daily   5/18/2022 at 0900     thin (NO BRAND SPECIFIED) lancets Use with lanceting device. To accompany: Blood Glucose Monitor Brands: per insurance. 200 each 11 5/18/2022 at 0900     venlafaxine (EFFEXOR) 75 MG tablet Take 75 mg by mouth 3 times daily   5/18/2022 at 0700     VENTOLIN  (90 Base) MCG/ACT inhaler INHALE 1 TO 2 PUFFS INTO THE LUNGS EVERY 6 HOURS AS NEEDED FOR SHORTNESS OF BREATH OR DIFFICULT BREATHING OR WHEEZING 18 g 3 5/17/2022 at 2330      Allergies  Allergies   Allergen Reactions     Penicillins Hives and Rash     itching       Sulfa Drugs Anaphylaxis     Immunization History   Administered Date(s) Administered     COVID-19,PF,Pfizer 12+ Yrs (2022 and After) 02/04/2022, 02/25/2022     DTAP (<7y) 1989, 03/19/1990, 03/07/1991     FLU 6-35 months 12/31/2009     V5a2-73 Novel Flu 12/31/2009     HPV Quadrivalent 08/20/2007     Hep B, Peds or Adolescent 05/30/1997, 08/17/1998, 08/21/1999     HepB-Adult 05/22/2018, 08/27/2018, 10/18/2018     Historical DTP/aP 1989, 03/19/1990, 03/07/1991     Influenza (H1N1) 12/31/2009     Influenza (IIV3) PF 12/31/2009     MMR 01/10/1991, 05/30/1997     Meningococcal (Menactra ) 08/20/2007     OPV, trivalent, live 1989, 1989, 03/19/1990, 03/07/1991     Poliovirus, inactivated (IPV) 1989, 1989, 03/19/1990, 03/07/1991     Td (Adult), Adsorbed 12/28/1998, 08/26/2002     Tdap (Adacel,Boostrix) 12/28/1998, 08/26/2002, 07/06/2010, 08/27/2018, 03/25/2022     Varicella 08/26/2002, 02/14/2003      Physical Exam  Patient Vitals for the past 24 hrs:   BP Temp Temp src Pulse Resp SpO2 Height Weight   05/18/22 1055 115/72 98.3  F (36.8  C) Oral 107 20 98 % 1.575 m (5' 2\") 120.7 kg (266 lb)     Wt Readings from Last 1 Encounters:   05/18/22 120.7 kg (266 lb)   Prepregnancy: 118.3 kg (260 lb 14.4 oz), BMI 47.71. Total gain: 2.313 kg (5 lb 1.6 oz) (expected gain: 5 kg (11 lb)-9 kg (19 lb)).    HEART: RRR, " no murmur  LUNGS: CTA bilaterally  ABDOMEN: obese with BMI 48, no RUQ tenderness    Fetal Heart Tones: Baseline 140 bpm, variability moderate (6-25 bpm), no decelerations. Has accelerations 15 x 15.  CONTRACTIONS:  Low amplitude .  CERVIX: dilation 0 cm , 0% effaced, medium, and -3 station.  FLUID: None noted.    Labs    Group B Strep PCR   Date Value Ref Range Status   04/29/2022 Negative Negative Final     Comment:     Presumed negative for Streptococcus agalactiae (Group B Streptococcus) or the number of organisms may be below the limit of detection of the assay.      Antibody Screen   Date Value Ref Range Status   12/16/2021 Negative Negative Final     Hepatitis B Surface Antigen   Date Value Ref Range Status   12/16/2021 Nonreactive Nonreactive Final     Neisseria gonorrhoeae   Date Value Ref Range Status   06/06/2020 Negative Negative Final     Hemoglobin   Date Value Ref Range Status   05/18/2022 11.4 (L) 11.7 - 15.7 g/dL Final      Admission on 05/18/2022   Component Date Value Ref Range Status     Sodium 05/18/2022 135 (A) 136 - 145 mmol/L Final     Potassium 05/18/2022 3.6  3.5 - 5.0 mmol/L Final     Chloride 05/18/2022 106  98 - 107 mmol/L Final     Carbon Dioxide (CO2) 05/18/2022 21 (A) 22 - 31 mmol/L Final     Anion Gap 05/18/2022 8  5 - 18 mmol/L Final     Urea Nitrogen 05/18/2022 7 (A) 8 - 22 mg/dL Final     Creatinine 05/18/2022 0.54 (A) 0.60 - 1.10 mg/dL Final     Calcium 05/18/2022 8.1 (A) 8.5 - 10.5 mg/dL Final     Glucose 05/18/2022 86  70 - 125 mg/dL Final     Alkaline Phosphatase 05/18/2022 167 (A) 45 - 120 U/L Final     AST 05/18/2022 55 (A) 0 - 40 U/L Final     ALT 05/18/2022 107 (A) 0 - 45 U/L Final     Protein Total 05/18/2022 6.8  6.0 - 8.0 g/dL Final     Albumin 05/18/2022 2.0 (A) 3.5 - 5.0 g/dL Final     Bilirubin Total 05/18/2022 0.6  0.0 - 1.0 mg/dL Final     GFR Estimate 05/18/2022 >90  >60 mL/min/1.73m2 Final    Effective December 21, 2021 eGFRcr in adults is calculated using the  2021 CKD-EPI creatinine equation which includes age and gender (Ephraim cavanaugh al., [place], DOI: 10.1056/GFSZdd4304362)     WBC Count 05/18/2022 9.9  4.0 - 11.0 10e3/uL Final     RBC Count 05/18/2022 3.78 (A) 3.80 - 5.20 10e6/uL Final     Hemoglobin 05/18/2022 11.4 (A) 11.7 - 15.7 g/dL Final     Hematocrit 05/18/2022 33.3 (A) 35.0 - 47.0 % Final     MCV 05/18/2022 88  78 - 100 fL Final     MCH 05/18/2022 30.2  26.5 - 33.0 pg Final     MCHC 05/18/2022 34.2  31.5 - 36.5 g/dL Final     RDW 05/18/2022 12.6  10.0 - 15.0 % Final     Platelet Count 05/18/2022 316  150 - 450 10e3/uL Final

## 2022-05-18 NOTE — TELEPHONE ENCOUNTER
Patient is currently admitted at [facility]    No further action needed    Sharon Phelan RN on 5/18/2022 at 11:33 AM

## 2022-05-18 NOTE — PROGRESS NOTES
Late entry 1030 [place] [facility] [place].  Pt states she started noticing that her hands were itching on Saturday 5/14 states the itching got worse by Monday .  Pt is remarkable for history of cholecystis with prior pregnancies. Pt also states she is cramping and had some  Spotting  Which started last night. Pt states she hasn't seen any thing this morning. Pt placed on monitor.      Addendum, Labs called to Dr Khan, Consulted with Dr Conrad. Plan is to induce pt. SVE per Dr Conrad.  Pt transferred to room 18 for induction of labor. Pt aware of plan of care. Report given to Mone GOLDEN RN

## 2022-05-19 PROBLEM — Z98.891 STATUS POST PRIMARY LOW TRANSVERSE CESAREAN SECTION: Status: ACTIVE | Noted: 2022-05-19

## 2022-05-19 PROBLEM — O26.649 CHOLESTASIS DURING PREGNANCY: Status: ACTIVE | Noted: 2022-05-19

## 2022-05-19 PROBLEM — U07.1 INFECTION DUE TO 2019 NOVEL CORONAVIRUS: Status: ACTIVE | Noted: 2022-05-19

## 2022-05-19 PROBLEM — F33.2 SEVERE EPISODE OF RECURRENT MAJOR DEPRESSIVE DISORDER (H): Status: ACTIVE | Noted: 2019-04-24

## 2022-05-19 LAB
BILE AC SERPL-SCNC: 10 UMOL/L
CLUE CELLS: ABNORMAL
CREAT SERPL-MCNC: 0.48 MG/DL (ref 0.6–1.1)
GFR SERPL CREATININE-BSD FRML MDRD: >90 ML/MIN/1.73M2
GLUCOSE BLDC GLUCOMTR-MCNC: 97 MG/DL (ref 70–99)
HOLD SPECIMEN: NORMAL
T PALLIDUM AB SER QL: NONREACTIVE
TRICHOMONAS, WET PREP: ABNORMAL
WBC'S/HIGH POWER FIELD, WET PREP: ABNORMAL
YEAST, WET PREP: ABNORMAL

## 2022-05-19 PROCEDURE — 250N000011 HC RX IP 250 OP 636: Performed by: ANESTHESIOLOGY

## 2022-05-19 PROCEDURE — 360N000076 HC SURGERY LEVEL 3, PER MIN: Performed by: OBSTETRICS & GYNECOLOGY

## 2022-05-19 PROCEDURE — 250N000011 HC RX IP 250 OP 636: Performed by: OBSTETRICS & GYNECOLOGY

## 2022-05-19 PROCEDURE — 88307 TISSUE EXAM BY PATHOLOGIST: CPT | Mod: TC | Performed by: OBSTETRICS & GYNECOLOGY

## 2022-05-19 PROCEDURE — 370N000017 HC ANESTHESIA TECHNICAL FEE, PER MIN: Performed by: OBSTETRICS & GYNECOLOGY

## 2022-05-19 PROCEDURE — 250N000009 HC RX 250: Performed by: FAMILY MEDICINE

## 2022-05-19 PROCEDURE — 36415 COLL VENOUS BLD VENIPUNCTURE: CPT | Performed by: OBSTETRICS & GYNECOLOGY

## 2022-05-19 PROCEDURE — 0UT70ZZ RESECTION OF BILATERAL FALLOPIAN TUBES, OPEN APPROACH: ICD-10-PCS | Performed by: OBSTETRICS & GYNECOLOGY

## 2022-05-19 PROCEDURE — 999N000249 HC STATISTIC C-SECTION ON UNIT

## 2022-05-19 PROCEDURE — 87210 SMEAR WET MOUNT SALINE/INK: CPT | Performed by: FAMILY MEDICINE

## 2022-05-19 PROCEDURE — 999N000016 HC STATISTIC ATTENDANCE AT DELIVERY

## 2022-05-19 PROCEDURE — 88307 TISSUE EXAM BY PATHOLOGIST: CPT | Mod: 26 | Performed by: PATHOLOGY

## 2022-05-19 PROCEDURE — 120N000001 HC R&B MED SURG/OB

## 2022-05-19 PROCEDURE — 250N000011 HC RX IP 250 OP 636: Performed by: FAMILY MEDICINE

## 2022-05-19 PROCEDURE — 88302 TISSUE EXAM BY PATHOLOGIST: CPT | Mod: 26 | Performed by: PATHOLOGY

## 2022-05-19 PROCEDURE — 250N000011 HC RX IP 250 OP 636

## 2022-05-19 PROCEDURE — 82565 ASSAY OF CREATININE: CPT | Performed by: OBSTETRICS & GYNECOLOGY

## 2022-05-19 PROCEDURE — 999N000157 HC STATISTIC RCP TIME EA 10 MIN

## 2022-05-19 PROCEDURE — 272N000001 HC OR GENERAL SUPPLY STERILE: Performed by: OBSTETRICS & GYNECOLOGY

## 2022-05-19 PROCEDURE — 250N000013 HC RX MED GY IP 250 OP 250 PS 637: Performed by: OBSTETRICS & GYNECOLOGY

## 2022-05-19 PROCEDURE — 250N000009 HC RX 250: Performed by: ANESTHESIOLOGY

## 2022-05-19 PROCEDURE — 258N000003 HC RX IP 258 OP 636: Performed by: ANESTHESIOLOGY

## 2022-05-19 PROCEDURE — 258N000003 HC RX IP 258 OP 636: Performed by: OBSTETRICS & GYNECOLOGY

## 2022-05-19 RX ORDER — OXYTOCIN/0.9 % SODIUM CHLORIDE 30/500 ML
PLASTIC BAG, INJECTION (ML) INTRAVENOUS CONTINUOUS PRN
Status: DISCONTINUED | OUTPATIENT
Start: 2022-05-19 | End: 2022-05-19

## 2022-05-19 RX ORDER — MORPHINE SULFATE 1 MG/ML
INJECTION, SOLUTION EPIDURAL; INTRATHECAL; INTRAVENOUS PRN
Status: DISCONTINUED | OUTPATIENT
Start: 2022-05-19 | End: 2022-05-19

## 2022-05-19 RX ORDER — IBUPROFEN 800 MG/1
800 TABLET, FILM COATED ORAL EVERY 6 HOURS
Status: DISCONTINUED | OUTPATIENT
Start: 2022-05-20 | End: 2022-05-21 | Stop reason: HOSPADM

## 2022-05-19 RX ORDER — CEFAZOLIN SODIUM 1 G/3ML
INJECTION, POWDER, FOR SOLUTION INTRAMUSCULAR; INTRAVENOUS PRN
Status: DISCONTINUED | OUTPATIENT
Start: 2022-05-19 | End: 2022-05-19

## 2022-05-19 RX ORDER — ACETAMINOPHEN 325 MG/1
975 TABLET ORAL EVERY 6 HOURS
Status: DISCONTINUED | OUTPATIENT
Start: 2022-05-19 | End: 2022-05-21 | Stop reason: HOSPADM

## 2022-05-19 RX ORDER — SODIUM CHLORIDE, SODIUM LACTATE, POTASSIUM CHLORIDE, CALCIUM CHLORIDE 600; 310; 30; 20 MG/100ML; MG/100ML; MG/100ML; MG/100ML
INJECTION, SOLUTION INTRAVENOUS CONTINUOUS
Status: DISCONTINUED | OUTPATIENT
Start: 2022-05-19 | End: 2022-05-21 | Stop reason: HOSPADM

## 2022-05-19 RX ORDER — KETOROLAC TROMETHAMINE 30 MG/ML
30 INJECTION, SOLUTION INTRAMUSCULAR; INTRAVENOUS ONCE
Status: COMPLETED | OUTPATIENT
Start: 2022-05-19 | End: 2022-05-19

## 2022-05-19 RX ORDER — SIMETHICONE 80 MG
80 TABLET,CHEWABLE ORAL 4 TIMES DAILY PRN
Status: DISCONTINUED | OUTPATIENT
Start: 2022-05-19 | End: 2022-05-21 | Stop reason: HOSPADM

## 2022-05-19 RX ORDER — HYDROCORTISONE 25 MG/G
CREAM TOPICAL 3 TIMES DAILY PRN
Status: DISCONTINUED | OUTPATIENT
Start: 2022-05-19 | End: 2022-05-21 | Stop reason: HOSPADM

## 2022-05-19 RX ORDER — TERBUTALINE SULFATE 1 MG/ML
INJECTION, SOLUTION SUBCUTANEOUS
Status: COMPLETED
Start: 2022-05-19 | End: 2022-05-19

## 2022-05-19 RX ORDER — OLANZAPINE 10 MG/1
10 TABLET ORAL AT BEDTIME
Status: DISCONTINUED | OUTPATIENT
Start: 2022-05-19 | End: 2022-05-21 | Stop reason: HOSPADM

## 2022-05-19 RX ORDER — KETOROLAC TROMETHAMINE 30 MG/ML
30 INJECTION, SOLUTION INTRAMUSCULAR; INTRAVENOUS EVERY 6 HOURS
Status: ACTIVE | OUTPATIENT
Start: 2022-05-19 | End: 2022-05-20

## 2022-05-19 RX ORDER — AMOXICILLIN 250 MG
1 CAPSULE ORAL 2 TIMES DAILY
Status: DISCONTINUED | OUTPATIENT
Start: 2022-05-19 | End: 2022-05-21 | Stop reason: HOSPADM

## 2022-05-19 RX ORDER — METHYLERGONOVINE MALEATE 0.2 MG/ML
200 INJECTION INTRAVENOUS
Status: DISCONTINUED | OUTPATIENT
Start: 2022-05-19 | End: 2022-05-21 | Stop reason: HOSPADM

## 2022-05-19 RX ORDER — ENOXAPARIN SODIUM 100 MG/ML
40 INJECTION SUBCUTANEOUS EVERY 12 HOURS
Status: DISCONTINUED | OUTPATIENT
Start: 2022-05-19 | End: 2022-05-21 | Stop reason: HOSPADM

## 2022-05-19 RX ORDER — BISACODYL 10 MG
10 SUPPOSITORY, RECTAL RECTAL DAILY PRN
Status: DISCONTINUED | OUTPATIENT
Start: 2022-05-21 | End: 2022-05-21 | Stop reason: HOSPADM

## 2022-05-19 RX ORDER — MISOPROSTOL 200 UG/1
400 TABLET ORAL
Status: DISCONTINUED | OUTPATIENT
Start: 2022-05-19 | End: 2022-05-21 | Stop reason: HOSPADM

## 2022-05-19 RX ORDER — METOCLOPRAMIDE HYDROCHLORIDE 5 MG/ML
10 INJECTION INTRAMUSCULAR; INTRAVENOUS EVERY 6 HOURS PRN
Status: DISCONTINUED | OUTPATIENT
Start: 2022-05-19 | End: 2022-05-21 | Stop reason: HOSPADM

## 2022-05-19 RX ORDER — OXYTOCIN/0.9 % SODIUM CHLORIDE 30/500 ML
340 PLASTIC BAG, INJECTION (ML) INTRAVENOUS CONTINUOUS PRN
Status: DISCONTINUED | OUTPATIENT
Start: 2022-05-19 | End: 2022-05-21 | Stop reason: HOSPADM

## 2022-05-19 RX ORDER — METOCLOPRAMIDE 10 MG/1
10 TABLET ORAL EVERY 6 HOURS PRN
Status: DISCONTINUED | OUTPATIENT
Start: 2022-05-19 | End: 2022-05-21 | Stop reason: HOSPADM

## 2022-05-19 RX ORDER — PROCHLORPERAZINE MALEATE 10 MG
10 TABLET ORAL EVERY 6 HOURS PRN
Status: DISCONTINUED | OUTPATIENT
Start: 2022-05-19 | End: 2022-05-21 | Stop reason: HOSPADM

## 2022-05-19 RX ORDER — CEFAZOLIN SODIUM 1 G/3ML
1 INJECTION, POWDER, FOR SOLUTION INTRAMUSCULAR; INTRAVENOUS ONCE
Status: COMPLETED | OUTPATIENT
Start: 2022-05-19 | End: 2022-05-19

## 2022-05-19 RX ORDER — LIDOCAINE HYDROCHLORIDE 20 MG/ML
INJECTION, SOLUTION EPIDURAL; INFILTRATION; INTRACAUDAL; PERINEURAL PRN
Status: DISCONTINUED | OUTPATIENT
Start: 2022-05-19 | End: 2022-05-19

## 2022-05-19 RX ORDER — VENLAFAXINE 75 MG/1
75 TABLET ORAL 3 TIMES DAILY
Status: DISCONTINUED | OUTPATIENT
Start: 2022-05-19 | End: 2022-05-20

## 2022-05-19 RX ORDER — AMOXICILLIN 250 MG
2 CAPSULE ORAL 2 TIMES DAILY
Status: DISCONTINUED | OUTPATIENT
Start: 2022-05-19 | End: 2022-05-21 | Stop reason: HOSPADM

## 2022-05-19 RX ORDER — PROCHLORPERAZINE 25 MG
25 SUPPOSITORY, RECTAL RECTAL EVERY 12 HOURS PRN
Status: DISCONTINUED | OUTPATIENT
Start: 2022-05-19 | End: 2022-05-21 | Stop reason: HOSPADM

## 2022-05-19 RX ORDER — OXYCODONE HYDROCHLORIDE 5 MG/1
5 TABLET ORAL EVERY 4 HOURS PRN
Status: DISCONTINUED | OUTPATIENT
Start: 2022-05-19 | End: 2022-05-21 | Stop reason: HOSPADM

## 2022-05-19 RX ORDER — OXYTOCIN 10 [USP'U]/ML
10 INJECTION, SOLUTION INTRAMUSCULAR; INTRAVENOUS
Status: DISCONTINUED | OUTPATIENT
Start: 2022-05-19 | End: 2022-05-21 | Stop reason: HOSPADM

## 2022-05-19 RX ORDER — ONDANSETRON 4 MG/1
4 TABLET, ORALLY DISINTEGRATING ORAL EVERY 6 HOURS PRN
Status: DISCONTINUED | OUTPATIENT
Start: 2022-05-19 | End: 2022-05-21 | Stop reason: HOSPADM

## 2022-05-19 RX ORDER — ONDANSETRON 2 MG/ML
4 INJECTION INTRAMUSCULAR; INTRAVENOUS EVERY 6 HOURS PRN
Status: DISCONTINUED | OUTPATIENT
Start: 2022-05-19 | End: 2022-05-21 | Stop reason: HOSPADM

## 2022-05-19 RX ORDER — CARBOPROST TROMETHAMINE 250 UG/ML
250 INJECTION, SOLUTION INTRAMUSCULAR
Status: DISCONTINUED | OUTPATIENT
Start: 2022-05-19 | End: 2022-05-21 | Stop reason: HOSPADM

## 2022-05-19 RX ORDER — ONDANSETRON 2 MG/ML
INJECTION INTRAMUSCULAR; INTRAVENOUS PRN
Status: DISCONTINUED | OUTPATIENT
Start: 2022-05-19 | End: 2022-05-19

## 2022-05-19 RX ORDER — ALBUTEROL SULFATE 90 UG/1
1-2 AEROSOL, METERED RESPIRATORY (INHALATION)
Status: DISCONTINUED | OUTPATIENT
Start: 2022-05-19 | End: 2022-05-21 | Stop reason: HOSPADM

## 2022-05-19 RX ORDER — MODIFIED LANOLIN
OINTMENT (GRAM) TOPICAL
Status: DISCONTINUED | OUTPATIENT
Start: 2022-05-19 | End: 2022-05-21 | Stop reason: HOSPADM

## 2022-05-19 RX ORDER — LIDOCAINE 40 MG/G
CREAM TOPICAL
Status: DISCONTINUED | OUTPATIENT
Start: 2022-05-19 | End: 2022-05-21 | Stop reason: HOSPADM

## 2022-05-19 RX ORDER — MISOPROSTOL 200 UG/1
800 TABLET ORAL
Status: DISCONTINUED | OUTPATIENT
Start: 2022-05-19 | End: 2022-05-21 | Stop reason: HOSPADM

## 2022-05-19 RX ORDER — DEXTROSE, SODIUM CHLORIDE, SODIUM LACTATE, POTASSIUM CHLORIDE, AND CALCIUM CHLORIDE 5; .6; .31; .03; .02 G/100ML; G/100ML; G/100ML; G/100ML; G/100ML
INJECTION, SOLUTION INTRAVENOUS CONTINUOUS
Status: DISCONTINUED | OUTPATIENT
Start: 2022-05-19 | End: 2022-05-21 | Stop reason: HOSPADM

## 2022-05-19 RX ADMIN — CEFAZOLIN 1 G: 1 INJECTION, POWDER, FOR SOLUTION INTRAMUSCULAR; INTRAVENOUS at 07:55

## 2022-05-19 RX ADMIN — Medication 300 ML/HR: at 01:41

## 2022-05-19 RX ADMIN — Medication 2 MG: at 02:00

## 2022-05-19 RX ADMIN — KETOROLAC TROMETHAMINE 30 MG: 30 INJECTION, SOLUTION INTRAMUSCULAR; INTRAVENOUS at 14:12

## 2022-05-19 RX ADMIN — SENNOSIDES AND DOCUSATE SODIUM 1 TABLET: 8.6; 5 TABLET ORAL at 08:02

## 2022-05-19 RX ADMIN — LIDOCAINE HYDROCHLORIDE 5 ML: 20 INJECTION, SOLUTION EPIDURAL; INFILTRATION; INTRACAUDAL; PERINEURAL at 01:25

## 2022-05-19 RX ADMIN — Medication 100 ML/HR: at 02:57

## 2022-05-19 RX ADMIN — KETOROLAC TROMETHAMINE 15 MG: 30 INJECTION, SOLUTION INTRAMUSCULAR at 02:11

## 2022-05-19 RX ADMIN — SODIUM CHLORIDE, POTASSIUM CHLORIDE, SODIUM LACTATE AND CALCIUM CHLORIDE: 600; 310; 30; 20 INJECTION, SOLUTION INTRAVENOUS at 09:00

## 2022-05-19 RX ADMIN — PHENYLEPHRINE HYDROCHLORIDE 100 MCG: 10 INJECTION INTRAVENOUS at 01:26

## 2022-05-19 RX ADMIN — OLANZAPINE 10 MG: 10 TABLET, FILM COATED ORAL at 21:35

## 2022-05-19 RX ADMIN — PHENYLEPHRINE HYDROCHLORIDE 100 MCG: 10 INJECTION INTRAVENOUS at 01:29

## 2022-05-19 RX ADMIN — VENLAFAXINE 75 MG: 75 TABLET ORAL at 14:12

## 2022-05-19 RX ADMIN — ENOXAPARIN SODIUM 40 MG: 100 INJECTION SUBCUTANEOUS at 20:27

## 2022-05-19 RX ADMIN — ACETAMINOPHEN 975 MG: 325 TABLET, COATED ORAL at 04:57

## 2022-05-19 RX ADMIN — CEFAZOLIN 2 G: 1 INJECTION, POWDER, FOR SOLUTION INTRAMUSCULAR; INTRAVENOUS at 01:24

## 2022-05-19 RX ADMIN — ACETAMINOPHEN 975 MG: 325 TABLET, COATED ORAL at 10:47

## 2022-05-19 RX ADMIN — KETOROLAC TROMETHAMINE 30 MG: 30 INJECTION, SOLUTION INTRAMUSCULAR; INTRAVENOUS at 20:30

## 2022-05-19 RX ADMIN — ONDANSETRON 4 MG: 2 INJECTION INTRAMUSCULAR; INTRAVENOUS at 08:28

## 2022-05-19 RX ADMIN — VENLAFAXINE 75 MG: 75 TABLET ORAL at 21:35

## 2022-05-19 RX ADMIN — PHENYLEPHRINE HYDROCHLORIDE 0.5 MCG/KG/MIN: 10 INJECTION INTRAVENOUS at 01:24

## 2022-05-19 RX ADMIN — ACETAMINOPHEN 975 MG: 325 TABLET, COATED ORAL at 16:47

## 2022-05-19 RX ADMIN — KETOROLAC TROMETHAMINE 30 MG: 30 INJECTION, SOLUTION INTRAMUSCULAR; INTRAVENOUS at 08:01

## 2022-05-19 RX ADMIN — SENNOSIDES AND DOCUSATE SODIUM 2 TABLET: 8.6; 5 TABLET ORAL at 20:28

## 2022-05-19 RX ADMIN — TERBUTALINE SULFATE 0.25 MG: 1 INJECTION SUBCUTANEOUS at 01:14

## 2022-05-19 RX ADMIN — ONDANSETRON 4 MG: 2 INJECTION INTRAMUSCULAR; INTRAVENOUS at 01:27

## 2022-05-19 RX ADMIN — VENLAFAXINE 75 MG: 75 TABLET ORAL at 08:02

## 2022-05-19 NOTE — DISCHARGE INSTRUCTIONS
Postop  Birth Instructions    Activity     Do not lift more than 10 pounds for 6 weeks after surgery.  Ask family and friends for help when you need it.  No driving until you have stopped taking your pain medications (usually two weeks after surgery).  No heavy exercise or activity for 6 weeks.  Don't do anything that will put a strain on your surgery site.  Don't strain when using the toilet.  Your care team may prescribe a stool softener if you have problems with your bowel movements.     To care for your incision:     Keep the incision clean and dry.  Do not soak your incision in water. No swimming or hot tubs until it has fully healed. You may soak in the bathtub if the water level is below your incision.  Do not use peroxide, gel, cream, lotion, or ointment on your incision.  Adjust your clothes to avoid pressure on your surgery site (check the elastic in your underwear for example).     You may see a small amount of clear or pink drainage and this is normal.  Check with your health care provider:     If the drainage increases or has an odor.  If the incision reddens, you have swelling, or develop a rash.  If you have increased pain and the medicine we prescribed doesn't help.  If you have a fever above 100.4 F (38 C) with or without chills when placing thermometer under your tongue.   The area around your incision (surgery wound), will feel numb.  This is normal. The numbness should go away in less than a year.     Keep your hands clean:  Always wash your hands before touching your incision (surgery wound). This helps reduce your risk of infection. If your hands aren't dirty, you may use an alcohol hand-rub to clean your hands. Keep your nails clean and short.    Call your healthcare provider if you have any of these symptoms:     You soak a sanitary pad with blood within 1 hour, or you see blood clots larger than a golf ball.  Bleeding that lasts more than 6 weeks.  Vaginal discharge that smells  "bad.  Severe pain, cramping or tenderness in your lower belly area.  A need to urinate more frequently (use the toilet more often), more urgently (use the toilet very quickly), or it burns when you urinate.  Nausea and vomiting.  Redness, swelling or pain around a vein in your leg.  Problems breastfeeding or a red or painful area on your breast.  Chest pain and cough or are gasping for air.  Problems with coping with sadness, anxiety or depression. If you have concerns about hurting yourself or the baby, call your provider immediately.    You have questions or concerns after you return home.                  Assessment of Breastfeeding after discharge: Is baby is getting enough to eat?    If you answer  YES  to all these questions by day 5, you will know breastfeeding is going well.    If you answer  NO  to any of these questions, call your baby's medical provider or the lactation clinic.   Refer to \"Postpartum and  Care\" (PNC) , starting on page 35. (This is the booklet you tracked baby's feedings and diaper counts while in the hospital.)   Please call one of our Outpatient Lactation Consultants at [phone] at any time with breastfeeding questions or concerns.    1.  My milk came in (breasts became wheeler on day 3-5 after birth).  I am softening the areola using hand expression or reverse pressure softening prior to latch, as needed.  YES NO   2.  My baby breastfeeds at least 8 times in 24 hours. YES NO   3.  My baby usually gives feeding cues (answer  No  if your baby is sleepy and you need to wake baby for most feedings).  *PNC page 36   YES NO   4.  My baby latches on my breast easily.  *PNC page 37  YES NO   5.  During breastfeeding, I hear my baby frequently swallowing, (one-two sucks per swallow).  YES NO   6.  I allow my baby to drain the first breast before I offer the other side.   YES NO   7.  My baby is satisfied after breastfeeding.   *PNC page 39 YES NO   8.  My breasts feel wheeler before " "feedings and softer after feedings. YES NO   9.  My breasts and nipples are comfortable.  I have no engorgement or cracked nipples.    *PNC Page 40 and 41  YES NO   10.  My baby is meeting the wet diaper goals each day.  *PNC page 38  YES NO   11.  My baby is meeting the soiled diaper goals each day. *PNC page 38 YES NO   12.  My baby is only getting my breast milk, no formula. YES NO   13. I know my baby needs to be back to birth weight by day 14.  YES NO   14. I know my baby will cluster feed and have growth spurts. *PNC page 39  YES NO   15.  I feel confident in breastfeeding.  If not, I know where to get support. YES NO      [employer] has a short video (2:47) called:   \"Great [place] Hold/ Asymmetric Latch \" Breastfeeding Education by [employer].        Other websites:  www.ibconline.ca-Breastfeeding Videos  www.[email or web address].org--Our videos-Breastfeeding  [email or web address].[email or web address]   "

## 2022-05-19 NOTE — ANESTHESIA POSTPROCEDURE EVALUATION
Patient: Luis Antonio Laura    Procedure: Procedure(s):   SECTION       Anesthesia Type:  Epidural    Note:  Disposition: Inpatient   Postop Pain Control: Uneventful            Sign Out: Well controlled pain   PONV: No   Neuro/Psych: Uneventful            Sign Out: Acceptable/Baseline neuro status   Airway/Respiratory: Uneventful            Sign Out: Acceptable/Baseline resp. status   CV/Hemodynamics: Uneventful            Sign Out: Acceptable CV status; No obvious hypovolemia; No obvious fluid overload   Other NRE: NONE   DID A NON-ROUTINE EVENT OCCUR? No    Event details/Postop Comments:  Patient ambulating, urinating without issue. No headache.           Last vitals:  Vitals:    22 1102 22 1139 22 1140   BP:  125/67    Pulse:      Resp: 18  18   Temp:   36.5  C (97.7  F)   SpO2: 100%         Electronically Signed By: Helena Palafox MD  May 19, 2022  11:58 AM

## 2022-05-19 NOTE — PROGRESS NOTES
DELIVERY - 1st ASSIST NOTE (Family Medicine)  [facility]  Location: [facility]    Date of Service: 2022  Service Provider: Elena June MD MD, Family Medicine    PATIENT INFO     Patient Name: Luis Antonio Laura      MRN:  [id number]     :  1990       PCP: Medina Melton     : none - English fluent    Luis Antonio Laura is now a  at 37w5d by Estimated Date of Delivery: 2022.      INDICATION FOR  DELIVERY: fetal distress.  I managed the patient's labor prior to decision to convert to  delivery; see separate admission and labor progress notes.       OPERATIVE DESCRIPTION: Patient was taken to the OR and primary low transverse  section performed in the usual fashion and without complications.  Please see OB/GYN, Dr. Bassett's surgical note for full details.  My assistance with and presence at the  section was medically indicated/necessary for both maternal and fetal well being as well as at the request of Dr. Bassett.  My duties included assisting with visualization and other duties as assigned by the primary surgeon.     Patient tolerated procedure well and went to back to room in good and stable condition.    FINDINGS: viable female infant    SPECIMENS: placenta sent for pathology     Elena June MD, MD, 2022 3:16 AM      Charges:     DELIVERY: [id number] (assist, sydni pkg)

## 2022-05-19 NOTE — PROGRESS NOTES
OB NOTE    CTSP for bradycardia  Pt in knee-chest  Pitocin off  Cx 7cm/-1  Still with late decels/poor variability  Given terb  Does not appear to be progressing rapidly  Would recommend CS  Given informed consent    Get Bassett MD

## 2022-05-19 NOTE — CONSULTS
[facility] LABOR & DELIVERY   [facility] CONSULTATION    NAME:Luis Antonio Laura  : 1990   MRN: [id number]   GESTATIONAL AGE: 37w4d    ADMISSION DATE: 2022     PCP:  Medina Melton     CHIEF COMPLAINT: Suspected cholestasis    HPI: I have been requested by Dr. Khan to evaluate Luis Antonio Laura for suspected cholstasis. Patient is a 31 year old,  female AT 37w4d gestation. History obtained through the patient and chart review. Patient reports itching of the palms and soles. Patient reports that these symptoms have been going on for 5 days and have progressively worsened. She has occasional contraction with vaginal discharge. Patient reports good fetal movement.     DIAGNOSIS COMPLICATING PREGNANCY  - GDMA1  - History of cholestasis in 2 prior pregnancies (and subsequent  IOLs)  - Anxiety/depression    OBSTETRICAL HISTORY:       PAST MEDICAL HISTORY:  Past Medical History:   Diagnosis Date     Cholelithiases      Depressive disorder      Obesity         PAST SURGICAL HISTORY:  Past Surgical History:   Procedure Laterality Date     CHOLECYSTECTOMY       INSERT INTRAUTERINE DEVICE       WISDOM TOOTH EXTRACTION          SOCIAL HISTORY:   reports that she quit smoking about 20 months ago. Her smoking use included vaping device. She quit after 7.00 years of use. She has never used smokeless tobacco. She reports current alcohol use of about 14.0 standard drinks of alcohol per week. She reports previous drug use. Drug: Marijuana.     MEDICATIONS:  No current facility-administered medications on file prior to encounter.  alcohol swab prep pads, Use to swab area of injection/stephen as directed.  blood glucose (NO BRAND SPECIFIED) test strip, Use to test blood sugar 4 times daily or as directed. To accompany: Blood Glucose Monitor Brands: per insurance.  blood glucose monitoring (NO BRAND SPECIFIED) meter device kit, Use to test blood sugar 4 times daily or as directed.  "Preferred blood glucose meter OR supplies to accompany: Blood Glucose Monitor Brands: per insurance.  ferrous sulfate (FEROSUL) 325 (65 Fe) MG tablet, Take 1 tablet (325 mg) by mouth daily (with breakfast)  OLANZapine (ZYPREXA) 10 MG tablet, Take 10 mg by mouth At Bedtime  Prenatal Multivit-Min-Fe-FA (PRENATAL, W/IRON & FA,) 27-0.8 MG TABS, Take 1 tablet by mouth daily  thin (NO BRAND SPECIFIED) lancets, Use with lanceting device. To accompany: Blood Glucose Monitor Brands: per insurance.  venlafaxine (EFFEXOR) 75 MG tablet, Take 75 mg by mouth 3 times daily  VENTOLIN  (90 Base) MCG/ACT inhaler, INHALE 1 TO 2 PUFFS INTO THE LUNGS EVERY 6 HOURS AS NEEDED FOR SHORTNESS OF BREATH OR DIFFICULT BREATHING OR WHEEZING         ALLERGIES:  Allergies   Allergen Reactions     Penicillins Hives and Rash     itching       Sulfa Drugs Anaphylaxis        REVIEW OF SYSTEMS   Negative except what is stated in the HPI    PHYSICAL EXAM:  /79   Pulse 107   Temp 97.8  F (36.6  C) (Oral)   Resp 16   Ht 1.575 m (5' 2\")   Wt 120.7 kg (266 lb)   LMP 08/28/2021   SpO2 97%   Breastfeeding No   BMI 48.65 kg/m    GEN: NAD; Alert and oriented, appropriate affect.  Abd: Gravid, soft, NT/ND  : 3/70/-3, very soft and difficult to find cervix       Fetal heart Rate Tracing: Cat I  Contractions:Rare ctx     LABS:  Results for orders placed or performed during the hospital encounter of 05/18/22   Comprehensive metabolic panel     Status: Abnormal   Result Value Ref Range    Sodium 135 (L) 136 - 145 mmol/L    Potassium 3.6 3.5 - 5.0 mmol/L    Chloride 106 98 - 107 mmol/L    Carbon Dioxide (CO2) 21 (L) 22 - 31 mmol/L    Anion Gap 8 5 - 18 mmol/L    Urea Nitrogen 7 (L) 8 - 22 mg/dL    Creatinine 0.54 (L) 0.60 - 1.10 mg/dL    Calcium 8.1 (L) 8.5 - 10.5 mg/dL    Glucose 86 70 - 125 mg/dL    Alkaline Phosphatase 167 (H) 45 - 120 U/L    AST 55 (H) 0 - 40 U/L     (H) 0 - 45 U/L    Protein Total 6.8 6.0 - 8.0 g/dL    Albumin 2.0 " (L) 3.5 - 5.0 g/dL    Bilirubin Total 0.6 0.0 - 1.0 mg/dL    GFR Estimate >90 >60 mL/min/1.73m2   CBC with platelets     Status: Abnormal   Result Value Ref Range    WBC Count 9.9 4.0 - 11.0 10e3/uL    RBC Count 3.78 (L) 3.80 - 5.20 10e6/uL    Hemoglobin 11.4 (L) 11.7 - 15.7 g/dL    Hematocrit 33.3 (L) 35.0 - 47.0 %    MCV 88 78 - 100 fL    MCH 30.2 26.5 - 33.0 pg    MCHC 34.2 31.5 - 36.5 g/dL    RDW 12.6 10.0 - 15.0 %    Platelet Count 316 150 - 450 10e3/uL   Asymptomatic COVID-19 Virus (Coronavirus) by PCR Nasopharyngeal     Status: Abnormal    Specimen: Nasopharyngeal; Swab   Result Value Ref Range    SARS CoV2 PCR Positive (A) Negative    Narrative    Testing was performed using the libby  SARS-CoV-2 & Influenza A/B Assay on the libby  [employer]orelei  System.  This test should be ordered for the detection of SARS-COV-2 in individuals who meet SARS-CoV-2 clinical and/or epidemiological criteria. Test performance is unknown in asymptomatic patients.  This test is for in vitro diagnostic use under the FDA EUA for laboratories certified under CLIA to perform moderate and/or high complexity testing. This test has not been FDA cleared or approved.  A negative test does not rule out the presence of PCR inhibitors in the specimen or target RNA in concentration below the limit of detection for the assay. The possibility of a false negative should be considered if the patient's recent exposure or clinical presentation suggests COVID-19.  [facility] are certified under the Clinical Laboratory Improvement Amendments of 1988 (CLIA-88) as qualified to perform moderate and/or high complexity laboratory testing.   Glucose by meter     Status: Normal   Result Value Ref Range    GLUCOSE BY METER POCT 77 70 - 99 mg/dL   Hemoglobin A1c     Status: Normal   Result Value Ref Range    Hemoglobin A1C 5.3 <=5.6 %   Glucose by meter     Status: Normal   Result Value Ref Range    GLUCOSE BY METER POCT 94 70 - 99 mg/dL   Adult  Type and Screen     Status: None   Result Value Ref Range    ABO/RH(D) O POS     Antibody Screen Negative Negative    SPECIMEN EXPIRATION DATE [id number]    ABO/Rh type and screen     Status: None    Narrative    The following orders were created for panel order ABO/Rh type and screen.  Procedure                               Abnormality         Status                     ---------                               -----------         ------                     Adult Type and Screen[[id number]]                            Final result                 Please view results for these tests on the individual orders.       IMAGING:  Maternal Fetal BPP Single  Narrative:         BPP  ---------------------------------------------------------------------------------------------------------  Pat. Name: EDELMIRA MEDINA       Study Date:  2022 3:12pm  Pat. NO:  [id number]        Referring  MD: NICK BAKER  Site:  [place]       Sonographer: Garima Gregory RDMS  :  1990        Age:   31  ---------------------------------------------------------------------------------------------------------    INDICATION  ---------------------------------------------------------------------------------------------------------  Obesity BMI >40. Gestational Diabetes (GDM) - Diet controlled    METHOD  ---------------------------------------------------------------------------------------------------------  Transabdominal ultrasound examination. View: Sufficient    PREGNANCY  ---------------------------------------------------------------------------------------------------------  Florez pregnancy. Number of fetuses: 1    DATING  ---------------------------------------------------------------------------------------------------------                                           Date                                Details                                                                                      Gest. age                       BR[place]NON  LMP                                  2021                                                                                                                         36 w + 5 d                     2022  Prior assessment               10/22/2021                       GA: 7 w + 5 d                                                                            36 w + 4 d                     2022  Assigned dating                  Dating performed on 2022, based on the LMP                                                            36 w + 5 d                     2022    GENERAL EVALUATION  ---------------------------------------------------------------------------------------------------------  Cardiac activity present.  bpm.  Fetal movements visualized.  Presentation cephalic.  Placenta  Posterior  Umbilical cord previously studied.    AMNIOTIC FLUID ASSESSMENT  ---------------------------------------------------------------------------------------------------------  Amount of AF: normal  MVP 7.1 cm    BIOPHYSICAL PROFILE  ---------------------------------------------------------------------------------------------------------  2: Fetal breathing movements  2: Gross body movements  2: Fetal tone  2: Amniotic fluid volume   Biophysical profile score  Interpretation: normal    RECOMMENDATION  ---------------------------------------------------------------------------------------------------------  Thank-you for referring your patient for  testing.    We discussed the results of the ultrasound with the patient.    Continue  surveillance as previously recommended.    Return to primary provider for continued prenatal care.    If you have questions regarding today's evaluation or if we can be of further service, please contact the [facility].    **Fetal anomalies may be present but not detected**  Impression:  IMPRESSION  ---------------------------------------------------------------------------------------------------------  1) Florez intrauterine pregnancy at 36w 5d gestational age.  2) The BPP is reassuring at 8/8.  3) The amniotic fluid volume appeared normal.     I have reviewed the radiologists interpretation     IMPRESSION:  31 year old  @ 37w4d   Suspected cholestasis of pregnancy with elevated LFTs    RECOMMENDATIONS:  Based on patient's history of ICP, current symptoms, and elevated LFTs, I have a high suspiscion for ICP.  Bps are wnl and therefore this isn't concerning for HELLP syndrome.   I recommend induction of labor at this time without waiting for results of pending bile acids.     - Recommend pitocin induction   - GBS negative   - Glucose 77. Monitor per protocol in labor     D/w Dr. Khan    Thank you for allowing us to participate in the care of this patient.  Please contact us with any questions/concerns.      Kacy Conrad MD on 2022 at 7:50 PM

## 2022-05-19 NOTE — PROGRESS NOTES
LATE ENTRY DUE TO IMMEDIATE CARE NEEDS    2330 - Communication with Dr June.  VO to collect a wet prep, place mckeon catheter and check cervix    2345 - D: Epidural placed @ 2150     A: Mckeon catheter placed with #16 Upper sorbian catheter after balloon tested, utilizing aseptic technique with immediate clear yellow urine, balloon inflated and catheter secured to thigh, tolerated well.  Pt repositioned to her left side for even distribution of labor epidural. Difficult to obtain FHR & contractions. Previous segments of tracing reactive and with occasional mild variables. Continued adjustments and position support provided to achieve FHR tracing.    R: Anticipate complete dilation, will recheck cervix as labor indicates    2350 - SVE 4-5/70-80/-3  BOW intact    0015 - Dr June updated on pt status and difficulty in monitoring FHT and Uctx.  Continued adjustments made to EMX2.    0035 - Returned to pt room to adjust EMx2.  Oxytocin continues to infuse at 12 mu/min.     0048 - Writer noticed a change to the FHR pattern.  FHR baseline 145, moderate variability.  Mild deceleration with and abrupt onset to aristides of 100, lasting 30 sec.  Subsequently followed by recurrent variable decelerations with increasing severity.  Oxytocin off.  Staff Assist requested.  Position change to right lateral.  IV fluid bolus administered. Dr June notified and asked to come to hospital.    0053 - Audible FHR deceleration with a aristides of [id number].  Dr Bassett, attending OBGYN notified, with gradual return to FHR baseline. Pt in hands and knees to support fetal heart ate recovery.    0058 - SROM, clear fluid.  SVE per ANDRE, RN, unable to accurate assess cervical status based on maternal position.  Fetal Scalp stim. FSE applied.  Addressing and informing pt of concern and purpose for associated interventions.    0111 - Recurrent FHR decelerations, aristides 45.  Dr Bassett verbally consented pt for c/s.      0115 - Pt left LDRP 18 an en route to  OR2.    0120 - FHR in  with mod variability and one notable variable. Dr June at bedside.  See OR note for complete delivery details.

## 2022-05-19 NOTE — ANESTHESIA PROCEDURE NOTES
Epidural catheter Procedure Note    Pre-Procedure   Staff -        Anesthesiologist:  Leonel Joyner MD       Performed By: anesthesiologist       Location: OB       Procedure Start/Stop Times: 5/18/2022 9:50 PM and 5/18/2022 10:22 PM       Pre-Anesthestic Checklist: patient identified, IV checked, risks and benefits discussed, informed consent, monitors and equipment checked, pre-op evaluation and at physician/surgeon's request  Timeout:       Correct Patient: Yes        Correct Procedure: Yes        Correct Site: Yes        Correct Position: Yes   Procedure Documentation  Procedure: epidural catheter       Patient Position: sitting       Skin prep: Chloraprep       Local skin infiltrated with mL of 1% lidocaine.        Insertion Site: L3-4. (midline approach).       Technique: LORT saline        Needle Type: Addyy needle       Needle Gauge: 18.        Needle Length (Inches): 3.5           Catheter threaded easily.         5 cm epidural space.         Threaded 14.5 cm at skin.         # of attempts: 2 and  # of redirects:  2    Assessment/Narrative         Paresthesias: No.       Test dose of mL lidocaine 1.5% w/ 1:200,000 epinephrine at.         Test dose negative, 3 minutes after injection, for signs of intravascular, subdural, or intrathecal injection.       Insertion/Infusion Method: LORT saline       Aspiration negative for Heme or CSF via Epidural Catheter.    Medication(s) Administered   Medication Administration Time: 5/18/2022 9:50 PM

## 2022-05-19 NOTE — PROGRESS NOTES
LATE ENTRY:    Called to hospital at 0057 due to non-reassuring fetal heart tracing. Patient was 5 cm dilated, ballotable fetal head, at 12 mU pitocin. FHT had been category 1 with occasional isolated variable deceleration. Starting at 0046 patient began to have recurrent deep variable decelerations with aristides at 40-60. In-house OB, Dr. Bassett was called to bedside. Pitocin was stopped, patient was repositioned to hands and knees. FHT did not recover and cervix was 7 cm/-1 station, and patient was consented for  section with tubal ligation- see Dr. Bassett's note for further details.      Elena June MD

## 2022-05-19 NOTE — PROGRESS NOTES
2155 - D: Pt requesting epidural for pain relief--rating her pain at 7 out of 10. SVE 4-5/75/-2     A: PT positioned in sitting position at side of bed, IV bolus of LR initiated, pulse oximeter and BP monitor placed on PT, MDA notified of pt request.    R: Dr. Joyner here, pt history reviewed, risks, benefits explained with patient giving verbal consent to proceed with epidural. Placement, test and therapeutic dose administered by Dr. Joyner.      Successful epidural placement and patient tolerated procedure well --she now rates her pain at 3 now. Epidural level at T10.  FHR stable during and after procedure. Plan for Luna placement.    See trending data for VS and fetal tolerance.

## 2022-05-19 NOTE — PLAN OF CARE
Problem: Adjustment to Role Transition (Postpartum  Delivery)  Goal: Successful Maternal Role Transition  Outcome: Ongoing, Progressing     Problem: Bleeding (Postpartum  Delivery)  Goal: Hemostasis  Outcome: Ongoing, Progressing     Problem: Infection (Postpartum  Delivery)  Goal: Absence of Infection Signs and Symptoms  Outcome: Ongoing, Progressing     Problem: Pain (Postpartum  Delivery)  Goal: Acceptable Pain Control  Outcome: Ongoing, Progressing  Intervention: Prevent or Manage Pain  Recent Flowsheet Documentation  Taken 2022 0800 by Elena Padilla, RN  Complementary Therapy:   essential oils utilized   aromatherapy utilized     Problem: Postoperative Nausea and Vomiting (Postpartum  Delivery)  Goal: Nausea and Vomiting Relief  Outcome: Ongoing, Progressing  Intervention: Prevent or Manage Postoperative Nausea and Vomiting  Recent Flowsheet Documentation  Taken 2022 0800 by Elena Padilla, RN  Nausea/Vomiting Interventions:   aromatherapy utilized   cool cloth applied   antiemetic     Problem: Postoperative Urinary Retention (Postpartum  Delivery)  Goal: Effective Urinary Elimination  Outcome: Ongoing, Progressing

## 2022-05-19 NOTE — PROGRESS NOTES
"Csection - Post operative day 0    ASSESSMENT: PLAN:   POD#1    Primary csection with bilateral tubal ligation (BTL)  due to  fetal intolerance to labor  COVID  cholestasis  Desired permanent sterilization   Continue routine cares       SUBJECTIVE:    The patient is tired.  Just had emesis. : Pain is well controlled. The patient has no emotional concerns.  The baby is well and being fed    OBJECTIVE:  /67   Pulse 107   Temp 97.9  F (36.6  C) (Oral)   Resp 18   Ht 1.575 m (5' 2\")   Wt 120.7 kg (266 lb)   LMP 08/28/2021   SpO2 96%   Breastfeeding Unknown   BMI 48.65 kg/m      Fundus firm  Incision- dressing dry   Ext- nontender      Amanda Santiago MD  [facility]  [phone]    "

## 2022-05-19 NOTE — ANESTHESIA CARE TRANSFER NOTE
Patient: Luis Antonio Laura    Procedure: Procedure(s):   SECTION       Diagnosis: Fetal distress affecting care [O36.8990]  Diagnosis Additional Information: No value filed.    Anesthesia Type:   Epidural     Note:    Oropharynx: oropharynx clear of all foreign objects  Level of Consciousness: awake  Oxygen Supplementation: room air    Independent Airway: airway patency satisfactory and stable  Dentition: dentition unchanged  Vital Signs Stable: post-procedure vital signs reviewed and stable  Report to RN Given: handoff report given  Patient transferred to: Labor and Delivery    Handoff Report: Identifed the Patient, Identified the Reponsible Provider, Reviewed the pertinent medical history, Discussed the surgical course, Reviewed Intra-OP anesthesia mangement and issues during anesthesia, Set expectations for post-procedure period and Allowed opportunity for questions and acknowledgement of understanding      Vitals:  Vitals Value Taken Time   /74 22 0232   Temp     Pulse 106    Resp 16    SpO2 97    Vitals shown include unvalidated device data.    Electronically Signed By: Leonel Joyner MD  May 19, 2022  2:33 AM

## 2022-05-19 NOTE — ANESTHESIA PREPROCEDURE EVALUATION
Anesthesia Pre-Procedure Evaluation    Patient: Luis Antonio Laura   MRN: [id number] : 1990        Procedure :           Past Medical History:   Diagnosis Date     Cholelithiases      Depressive disorder      Obesity       Past Surgical History:   Procedure Laterality Date     CHOLECYSTECTOMY       INSERT INTRAUTERINE DEVICE       WISDOM TOOTH EXTRACTION        Allergies   Allergen Reactions     Penicillins Hives and Rash     itching       Sulfa Drugs Anaphylaxis      Social History     Tobacco Use     Smoking status: Former Smoker     Years: 7.00     Types: Vaping Device     Quit date: 9/10/2020     Years since quittin.6     Smokeless tobacco: Never Used   Substance Use Topics     Alcohol use: Yes     Alcohol/week: 14.0 standard drinks     Comment: prior to pregnacy maybe 2-3 times a week      Wt Readings from Last 1 Encounters:   22 120.7 kg (266 lb)        Anesthesia Evaluation   Pt has had prior anesthetic. Type: OB Labor Epidural.        ROS/MED HX  ENT/Pulmonary:  - neg pulmonary ROS     Neurologic:  - neg neurologic ROS     Cardiovascular:  - neg cardiovascular ROS     METS/Exercise Tolerance: >4 METS    Hematologic:  - neg hematologic  ROS     Musculoskeletal:  - neg musculoskeletal ROS     GI/Hepatic:     (+) cholestasis    Renal/Genitourinary:  - neg Renal ROS     Endo:     (+) Obesity, gestational diabetes,    Psychiatric/Substance Use:  - neg psychiatric ROS     Infectious Disease:  - neg infectious disease ROS     Malignancy:  - neg malignancy ROS     Other:  - neg other ROS          Physical Exam    Airway        Mallampati: II   TM distance: > 3 FB   Neck ROM: full   Mouth opening: > 3 cm    Respiratory Devices and Support         Dental     Comment: Multiple missing teeth        Cardiovascular   cardiovascular exam normal          Pulmonary   pulmonary exam normal                OUTSIDE LABS:  CBC:   Lab Results   Component Value Date    WBC 9.9 2022    WBC 8.8 2021    HGB  11.4 (L) 05/18/2022    HGB 11.3 (L) 04/22/2022    HCT 33.3 (L) 05/18/2022    HCT 32.7 (L) 12/16/2021     05/18/2022     12/16/2021     BMP:   Lab Results   Component Value Date     (L) 05/18/2022     03/03/2021    POTASSIUM 3.6 05/18/2022    POTASSIUM 3.6 03/03/2021    CHLORIDE 106 05/18/2022    CHLORIDE 103 03/03/2021    CO2 21 (L) 05/18/2022    CO2 24 03/03/2021    BUN 7 (L) 05/18/2022    BUN 8 03/03/2021    CR 0.54 (L) 05/18/2022    CR 0.65 03/03/2021    GLC 94 05/18/2022    GLC 77 05/18/2022     COAGS: No results found for: PTT, INR, FIBR  POC:   Lab Results   Component Value Date    HCG Positive (A) 10/15/2021    HCGS Negative 04/08/2019     HEPATIC:   Lab Results   Component Value Date    ALBUMIN 2.0 (L) 05/18/2022    PROTTOTAL 6.8 05/18/2022     (H) 05/18/2022    AST 55 (H) 05/18/2022    ALKPHOS 167 (H) 05/18/2022    BILITOTAL 0.6 05/18/2022     OTHER:   Lab Results   Component Value Date    A1C 5.3 05/18/2022    RENNY 8.1 (L) 05/18/2022    LIPASE 12 06/06/2020    CRP 4.1 (H) 03/03/2021       Anesthesia Plan    ASA Status:  3, emergent       Anesthesia Type: Epidural.              Consents    Anesthesia Plan(s) and associated risks, benefits, and realistic alternatives discussed. Questions answered and patient/representative(s) expressed understanding.    - Discussed:     - Discussed with:  Patient         Postoperative Care            Comments:    Other Comments: Conversion to C section due to heart tones       neg OB ROS.       Leonel Joyner MD

## 2022-05-19 NOTE — PROGRESS NOTES
Patient delivered baby girl at 0139 via C/Section by Dr. Bassett.  Apgars 8 and 9 at one and five minutes. Baby bought to warmer where NICU team awaits.

## 2022-05-19 NOTE — OP NOTE
PROCEDURE NOTE -  SECTION WITH BILATERAL TUBAL LIGATION    NAME: Luis Antonio Laura  : 1990  MRN: [id number]     DATE OF SERVICE: 2022    PREOPERATIVE DIAGNOSIS:   1. Intrauterine pregnancy at 37w5d   2. desires permanent sterilization.  3. Cholestasis  4. + COVID  5.Fetal intolerance to labor    POSTOPERATIVE DIAGNOSIS: Same    PROCEDURE: Low Transverse  Section with Bilateral Salpingectomy    SURGEON(S): Get Bassett MD    ASSISTANTS: OR staff    ANESTHESIA: Epidural    Delivery QBL (mL): 612     DRAINS: Luna    SPECIMENS: Portions of left and right tubes    COMPLICATIONS: None.     HISTORY OF PRESENT ILLNESS:  This is an 31 year old @[facility]@ female desiring permanent sterilization. The risks, benefits and alternatives to the procedure were discussed with her. She understood these to include but not limited to injury to adjacent organs including bowel, bladder, ureter, infection and bleeding. Risks of tubal ligations were also discussed including a failure rate of 1/200, irreversibility and increased risk of an ectopic with tubal ligation. All of her questions were answered to her satisfaction. She expressed understanding and wished to proceed, consents were signed    FINDINGS: Normal uterus, tubes and ovaries bilateral. Normal appearance to the adnexae.  Live female infant born with Apgars of 8   at 1 minute, 9  at 5 minutes,  Weight (oz):  97.36  oz.    PROCEDURE:        Patient was brought to the operating room in stable condition.  After induction of a epideral, fetal heart tones were checked and [resent. She was carefully prepped and draped in sterile fashion for the procedure.  A timeout was then performed.      A pfannensteil skin incision was made to the level of the fascia.  The fascia was incised laterally. Kocher clamps were applied to the superior aspect of the incision which was sharply dissected from the rectus muscles.  The kocher clamps were then reapplied to  the inferior aspect of the incision which was similarly sharply dissected from the rectus muscles.  The rectus muscles were  bluntly in the midline.  The peritoneum was entered sharply. This incision was then extended.  A bladder blade was introduced. The vesicouterine peritoneum was identified and a bladder flap was  created.  A low transverse uterine incision was made and the amniotic sac was ruptured revealing clear amniotic fluid.  The baby's head was then delivered.There was a nuchal cord noted. There was a spontaneous cry and therefore bulb suction was performed. The remainder of the infant was easily delivered. The cord was clamped x 2 and cut and the infant handed off to waiting nursing personnel.    The placenta was then manually removed from the uterus.  The uterus was exteriorized, covered with a moist laparotomy sponge and cleared of all clots and debris.  The uterine incision was closed with 0 chromic from both angles in a running locking suture.     Both tubes were then removed in there entirety. This was accomplished by coming across the mesosalpinx and cauterizing the vessels via Ligasure. The isthmic ampullary junction was cauterized for a distance of 3 cm. Good hemostasis was noted. The uterus was then returned to the abdominopelvic cavity.  The pericolic gutters were cleared of all clots and debris.  The uterine incision and tubes were again inspected and noted to be hemostatic.     The peritoneum was closed with vicryl suture superiorly to inferiorly.  The rectus muscles were made hemostatic with the use of electrocautery and brought together with figure-of-8 sutures of vicryl suture, the fascia was brought together with PDS loop from both angles in a running nonlocking suture, met at the midline, the subcutaneous tissues were irrigated, made hemostatic with use of electrocautery and brought together with 3-0 plain.  Skin was closed with staples.  Patient tolerated this procedure well.   Sponge, lap and needle counts were correct x two.     Get Bassett MD        CC1: Medina Melton, Get Bassett MD

## 2022-05-19 NOTE — PROGRESS NOTES
1915 - Assumed care of 30 yo, , 37+4 WGA, admitted today for MIOL for cholistasis. Prenatal history remarkable for GDM diet control. Luis Antonio is resting in bed with the Valentine device providing labor feedback. 's with mod variability and occasional mild variables.  Uctx q 1-4 min lasting 60 seconds, mod to abd palpation.  She rates her pain 5/10.  Oxytocin infusing at 10 mu/min.  VSS.  Assessment complete. She denies needing any medication to manage her itching at this time.  Luis Antonio states that she does desire a labor epidural for pain management and will call when she desires it.  Reviewed her POC, labor timeline and addressed questions and concerns presented.    193 - Oxytocin increased to 12 mu/min.

## 2022-05-20 LAB
HGB BLD-MCNC: 9.3 G/DL (ref 11.7–15.7)
PATH REPORT.COMMENTS IMP SPEC: NORMAL
PATH REPORT.COMMENTS IMP SPEC: NORMAL
PATH REPORT.FINAL DX SPEC: NORMAL
PATH REPORT.GROSS SPEC: NORMAL
PATH REPORT.MICROSCOPIC SPEC OTHER STN: NORMAL
PATH REPORT.RELEVANT HX SPEC: NORMAL
PHOTO IMAGE: NORMAL

## 2022-05-20 PROCEDURE — 93010 ELECTROCARDIOGRAM REPORT: CPT | Performed by: INTERNAL MEDICINE

## 2022-05-20 PROCEDURE — 85018 HEMOGLOBIN: CPT | Performed by: OBSTETRICS & GYNECOLOGY

## 2022-05-20 PROCEDURE — 99231 SBSQ HOSP IP/OBS SF/LOW 25: CPT | Performed by: FAMILY MEDICINE

## 2022-05-20 PROCEDURE — 93005 ELECTROCARDIOGRAM TRACING: CPT

## 2022-05-20 PROCEDURE — 250N000011 HC RX IP 250 OP 636: Performed by: OBSTETRICS & GYNECOLOGY

## 2022-05-20 PROCEDURE — 36415 COLL VENOUS BLD VENIPUNCTURE: CPT | Performed by: OBSTETRICS & GYNECOLOGY

## 2022-05-20 PROCEDURE — 250N000013 HC RX MED GY IP 250 OP 250 PS 637: Performed by: OBSTETRICS & GYNECOLOGY

## 2022-05-20 PROCEDURE — 120N000001 HC R&B MED SURG/OB

## 2022-05-20 RX ORDER — VENLAFAXINE 75 MG/1
75 TABLET ORAL DAILY
Status: DISCONTINUED | OUTPATIENT
Start: 2022-05-21 | End: 2022-05-20

## 2022-05-20 RX ORDER — VENLAFAXINE HYDROCHLORIDE 75 MG/1
75 CAPSULE, EXTENDED RELEASE ORAL
Status: DISCONTINUED | OUTPATIENT
Start: 2022-05-21 | End: 2022-05-21

## 2022-05-20 RX ORDER — VENLAFAXINE 75 MG/1
75 TABLET ORAL
Status: DISCONTINUED | OUTPATIENT
Start: 2022-05-21 | End: 2022-05-20

## 2022-05-20 RX ADMIN — ENOXAPARIN SODIUM 40 MG: 100 INJECTION SUBCUTANEOUS at 21:38

## 2022-05-20 RX ADMIN — ACETAMINOPHEN 975 MG: 325 TABLET, COATED ORAL at 16:00

## 2022-05-20 RX ADMIN — IBUPROFEN 800 MG: 800 TABLET ORAL at 02:30

## 2022-05-20 RX ADMIN — IBUPROFEN 800 MG: 800 TABLET ORAL at 19:46

## 2022-05-20 RX ADMIN — IBUPROFEN 800 MG: 800 TABLET ORAL at 14:20

## 2022-05-20 RX ADMIN — VENLAFAXINE 75 MG: 75 TABLET ORAL at 16:00

## 2022-05-20 RX ADMIN — ACETAMINOPHEN 975 MG: 325 TABLET, COATED ORAL at 21:36

## 2022-05-20 RX ADMIN — VENLAFAXINE 75 MG: 75 TABLET ORAL at 11:33

## 2022-05-20 RX ADMIN — IBUPROFEN 800 MG: 800 TABLET ORAL at 09:37

## 2022-05-20 RX ADMIN — ENOXAPARIN SODIUM 40 MG: 100 INJECTION SUBCUTANEOUS at 09:37

## 2022-05-20 RX ADMIN — SENNOSIDES AND DOCUSATE SODIUM 1 TABLET: 8.6; 5 TABLET ORAL at 09:37

## 2022-05-20 RX ADMIN — ACETAMINOPHEN 975 MG: 325 TABLET, COATED ORAL at 09:37

## 2022-05-20 ASSESSMENT — EDINBURGH POSTNATAL DEPRESSION SCALE (EPDS): TOTAL SCORE: 9

## 2022-05-20 NOTE — PROGRESS NOTES
[facility] Obstetrics Post-Partum Progress Note          Assessment and Plan:    Assessment:   Very sleepy, but able to wake up and engage a bit with the baby  Encouraged breastfeeding   Encouraged ambulation       Plan:   Ambulation encouraged           Interval History:   Doing well.  Pain is adequately controlled.  No fevers.  No history of foul-smelling vaginal discharge.  Good appetite.  Denies chest pain, shortness of breath, nausea or vomiting.  Vaginal bleeding is similar to a heavy menstrual flow.  She is very sleepy and hd trouble waking up.  Is urinating, but not yet passing gas.  Pain is ok.           Significant Problems:              Review of Systems:    The patient denies any chest pain, shortness of breath, excessive pain, fever, chills, purulent drainage from the wound, nausea or vomiting.          Medications:   Was on venlafaxine in this pregnancy          Physical Exam:   All vitals stable  Temp: 98.3  F (36.8  C) Temp src: Oral BP: 126/78 Pulse: 75   Resp: 18 SpO2: 97 % O2 Device: None (Room air)    Heart is regular rate and rhythm and lungs clear to auscultation  No edema  Very sleepy on exam.            Data:     All imaging studies related to this surgery reviewed    Melissa Hong MD

## 2022-05-20 NOTE — PROGRESS NOTES
"Csection - Post operative day 1    ASSESSMENT: PLAN:   POD#1    Primary  low segment transverse  section  COVID  Acute blood loss anemia- doing well- discussed s/s with patient- will monitor    Continue routine cares   aniticipate discharge in am    SUBJECTIVE:    The patient is sleeping.  Nurse states that : Catheter is out, bleeding decreased, tolerating normal diet, and passing flatus.  Pain is well controlled. The patient has no emotional concerns.  The baby is well and being fed    OBJECTIVE:  BP (!) 140/83 (BP Location: Right arm, Patient Position: Semi-Chamberlain's)   Pulse 75   Temp 98.3  F (36.8  C) (Oral)   Resp 18   Ht 1.575 m (5' 2\")   Wt 120.7 kg (266 lb)   LMP 2021   SpO2 97%   Breastfeeding Unknown   BMI 48.65 kg/m      Fundus firm  Incision- dressing dry   Ext- nontender      Lab  Hemoglobin   Date Value Ref Range Status   2022 9.3 (L) 11.7 - 15.7 g/dL Final   ]        Amanda Santiago MD  [facility]  [phone]    "

## 2022-05-20 NOTE — PROGRESS NOTES
Pt reports that she was taking effexor 75mg po daily .   Will change medication to reflect this .   She doesn't know if it was ER or not .   Will get EKG now as she received this medication tid yesterday and twice today.    I called the pharmacy and they confirmed she gets venlafaxine XR 75mg po daily.      Melissa Hong MD

## 2022-05-20 NOTE — PLAN OF CARE
Problem: Delayed Labor Progression (Labor)  Goal: Effective Progression to Delivery  Outcome: Met     Problem: Infection (Labor)  Goal: Absence of Infection Signs and Symptoms  Outcome: Met  Intervention: Prevent or Manage Infection  Recent Flowsheet Documentation  Taken 5/19/2022 2030 by Sayda Stroud, RN  Infection Prevention: equipment surfaces disinfected     Problem: Labor Pain (Labor)  Goal: Acceptable Pain Control  Outcome: Met     Problem: Uterine Tachysystole (Labor)  Goal: Normal Uterine Contraction Pattern  Outcome: Met

## 2022-05-20 NOTE — PLAN OF CARE
Problem: Plan of Care - These are the overarching goals to be used throughout the patient stay.    Goal: Plan of Care Review/Shift Note  Outcome: Ongoing, Progressing  Flowsheets (Taken 5/20/2022 1304)  Plan of Care Reviewed With: patient     VSS    Minimal incisional pain being managed with ibuprofen and tylenol.    Up independent in room. Showered.    Voiding.    Incisional dressing dry/intact with moderate old drainage.     Tolerating regular diet.    Pt was extremely sleepy this AM, not interacting with baby much even when baby crying. However much more alert this afternoon and is breastfeeding baby and bottle feeding with goal of breastfeeding. Pump set up in room and encouraged to use after feeding baby. Hand expression taught and encouraged.     Minimal lochia    Fundus firm at umbilicus.

## 2022-05-20 NOTE — PLAN OF CARE
"End of shift summary.  Patient resting well between checks.  Postpartum checks WNL.  Dressing with old drainage marked.  No increase.  Patient encouraged to get up and ambulate around room, but resting in bed.  Up to b/r and voiding without difficulty.  Pain adequately controled with ibuprofen and tylenol.  VSS.  Afebrile.  Infant rooming in with mom.  Sayda Stroud RN on 5/20/2022 at 3:57 AM    Problem: Bleeding (Labor)  Goal: Hemostasis  Outcome: Unable to Meet, Plan Revised     Problem: Change in Fetal Wellbeing (Labor)  Goal: Stable Fetal Wellbeing  Outcome: Unable to Meet, Plan Revised  Intervention: Promote and Monitor Fetal Wellbeing  Recent Flowsheet Documentation  Taken 5/19/2022 2030 by Sayda Stroud RN  Body Position: position changed independently     Problem: Plan of Care - These are the overarching goals to be used throughout the patient stay.    Goal: Plan of Care Review/Shift Note  Description: The Plan of Care Review/Shift note should be completed every shift.  The Outcome Evaluation is a brief statement about your assessment that the patient is improving, declining, or no change.  This information will be displayed automatically on your shift note.  Outcome: Ongoing, Progressing  Goal: Patient-Specific Goal (Individualized)  Description: You can add care plan individualizations to a care plan. Examples of Individualization might be:  \"Parent requests to be called daily at 9am for status\", \"I have a hard time hearing out of my right ear\", or \"Do not touch me to wake me up as it startles me\".  Outcome: Ongoing, Progressing  Goal: Absence of Hospital-Acquired Illness or Injury  Outcome: Ongoing, Progressing  Intervention: Prevent Skin Injury  Recent Flowsheet Documentation  Taken 5/19/2022 2030 by Sayda Stroud, RN  Body Position: position changed independently  Intervention: Prevent and Manage VTE (Venous Thromboembolism) Risk  Recent Flowsheet Documentation  Taken 5/19/2022 2030 by Luanne, " TERRELL Alfredo  Range of Motion: active ROM (range of motion) encouraged  VTE Prevention/Management: SCDs (sequential compression devices) off  Activity Management: activity encouraged  Intervention: Prevent Infection  Recent Flowsheet Documentation  Taken 2022 by Sayda Stroud RN  Infection Prevention: equipment surfaces disinfected  Goal: Optimal Comfort and Wellbeing  Outcome: Ongoing, Progressing  Intervention: Provide Person-Centered Care  Recent Flowsheet Documentation  Taken 2022 by Sayda Stroud RN  Trust Relationship/Rapport: care explained  Goal: Readiness for Transition of Care  Outcome: Ongoing, Progressing     Problem: Bleeding (Postpartum  Delivery)  Goal: Hemostasis  Outcome: Ongoing, Progressing     Problem: Infection (Postpartum  Delivery)  Goal: Absence of Infection Signs and Symptoms  Outcome: Ongoing, Progressing     Problem: Pain (Postpartum  Delivery)  Goal: Acceptable Pain Control  Outcome: Ongoing, Progressing     Problem: Postoperative Nausea and Vomiting (Postpartum  Delivery)  Goal: Nausea and Vomiting Relief  2022 0353 by Sayda Stroud RN  Outcome: Ongoing, Progressing  2022 0353 by Sayda Stroud RN  Outcome: Met     Problem: Postoperative Urinary Retention (Postpartum  Delivery)  Goal: Effective Urinary Elimination  Outcome: Ongoing, Progressing

## 2022-05-21 VITALS
TEMPERATURE: 98.4 F | DIASTOLIC BLOOD PRESSURE: 78 MMHG | BODY MASS INDEX: 48.95 KG/M2 | RESPIRATION RATE: 20 BRPM | OXYGEN SATURATION: 99 % | WEIGHT: 266 LBS | HEART RATE: 81 BPM | SYSTOLIC BLOOD PRESSURE: 133 MMHG | HEIGHT: 62 IN

## 2022-05-21 LAB
ALBUMIN MFR UR ELPH: <7 MG/DL
ALT SERPL W P-5'-P-CCNC: 57 U/L (ref 0–45)
ALT SERPL W P-5'-P-CCNC: 62 U/L (ref 0–45)
AST SERPL W P-5'-P-CCNC: 21 U/L (ref 0–40)
AST SERPL W P-5'-P-CCNC: 22 U/L (ref 0–40)
CREAT SERPL-MCNC: 0.55 MG/DL (ref 0.6–1.1)
CREAT UR-MCNC: 13 MG/DL
ERYTHROCYTE [DISTWIDTH] IN BLOOD BY AUTOMATED COUNT: 13 % (ref 10–15)
GFR SERPL CREATININE-BSD FRML MDRD: >90 ML/MIN/1.73M2
HCT VFR BLD AUTO: 27.3 % (ref 35–47)
HGB BLD-MCNC: 9.2 G/DL (ref 11.7–15.7)
HOLD SPECIMEN: NORMAL
HOLD SPECIMEN: NORMAL
MCH RBC QN AUTO: 30.4 PG (ref 26.5–33)
MCHC RBC AUTO-ENTMCNC: 33.7 G/DL (ref 31.5–36.5)
MCV RBC AUTO: 90 FL (ref 78–100)
PLATELET # BLD AUTO: 297 10E3/UL (ref 150–450)
PROT/CREAT 24H UR: NORMAL MG/G{CREAT}
RBC # BLD AUTO: 3.03 10E6/UL (ref 3.8–5.2)
WBC # BLD AUTO: 13 10E3/UL (ref 4–11)

## 2022-05-21 PROCEDURE — 84156 ASSAY OF PROTEIN URINE: CPT | Performed by: OBSTETRICS & GYNECOLOGY

## 2022-05-21 PROCEDURE — 250N000011 HC RX IP 250 OP 636: Performed by: OBSTETRICS & GYNECOLOGY

## 2022-05-21 PROCEDURE — 82565 ASSAY OF CREATININE: CPT | Performed by: OBSTETRICS & GYNECOLOGY

## 2022-05-21 PROCEDURE — 85027 COMPLETE CBC AUTOMATED: CPT | Performed by: OBSTETRICS & GYNECOLOGY

## 2022-05-21 PROCEDURE — 250N000013 HC RX MED GY IP 250 OP 250 PS 637: Performed by: OBSTETRICS & GYNECOLOGY

## 2022-05-21 PROCEDURE — 84460 ALANINE AMINO (ALT) (SGPT): CPT | Performed by: OBSTETRICS & GYNECOLOGY

## 2022-05-21 PROCEDURE — 36415 COLL VENOUS BLD VENIPUNCTURE: CPT | Performed by: OBSTETRICS & GYNECOLOGY

## 2022-05-21 PROCEDURE — 84450 TRANSFERASE (AST) (SGOT): CPT | Performed by: OBSTETRICS & GYNECOLOGY

## 2022-05-21 RX ORDER — MAGNESIUM SULFATE HEPTAHYDRATE 40 MG/ML
2 INJECTION, SOLUTION INTRAVENOUS
Status: DISCONTINUED | OUTPATIENT
Start: 2022-05-21 | End: 2022-05-21 | Stop reason: HOSPADM

## 2022-05-21 RX ORDER — MAGNESIUM SULFATE HEPTAHYDRATE 500 MG/ML
10 INJECTION, SOLUTION INTRAMUSCULAR; INTRAVENOUS
Status: DISCONTINUED | OUTPATIENT
Start: 2022-05-21 | End: 2022-05-21 | Stop reason: HOSPADM

## 2022-05-21 RX ORDER — NIFEDIPINE 10 MG/1
10-20 CAPSULE ORAL
Status: DISCONTINUED | OUTPATIENT
Start: 2022-05-21 | End: 2022-05-21 | Stop reason: HOSPADM

## 2022-05-21 RX ORDER — OXYCODONE HYDROCHLORIDE 5 MG/1
5 TABLET ORAL EVERY 4 HOURS PRN
Qty: 12 TABLET | Refills: 0 | Status: SHIPPED | OUTPATIENT
Start: 2022-05-21 | End: 2022-06-30

## 2022-05-21 RX ORDER — MAGNESIUM SULFATE 4 G/50ML
4 INJECTION INTRAVENOUS
Status: DISCONTINUED | OUTPATIENT
Start: 2022-05-21 | End: 2022-05-21 | Stop reason: HOSPADM

## 2022-05-21 RX ORDER — LORAZEPAM 2 MG/ML
2 INJECTION INTRAMUSCULAR
Status: DISCONTINUED | OUTPATIENT
Start: 2022-05-21 | End: 2022-05-21 | Stop reason: HOSPADM

## 2022-05-21 RX ORDER — VENLAFAXINE HYDROCHLORIDE 75 MG/1
75 CAPSULE, EXTENDED RELEASE ORAL
Status: DISCONTINUED | OUTPATIENT
Start: 2022-05-21 | End: 2022-05-21 | Stop reason: HOSPADM

## 2022-05-21 RX ORDER — VENLAFAXINE HYDROCHLORIDE 37.5 MG/1
75 TABLET, EXTENDED RELEASE ORAL
Status: DISCONTINUED | OUTPATIENT
Start: 2022-05-21 | End: 2022-05-21

## 2022-05-21 RX ORDER — LABETALOL HYDROCHLORIDE 5 MG/ML
20 INJECTION, SOLUTION INTRAVENOUS
Status: DISCONTINUED | OUTPATIENT
Start: 2022-05-21 | End: 2022-05-21 | Stop reason: HOSPADM

## 2022-05-21 RX ORDER — SODIUM CHLORIDE, SODIUM LACTATE, POTASSIUM CHLORIDE, CALCIUM CHLORIDE 600; 310; 30; 20 MG/100ML; MG/100ML; MG/100ML; MG/100ML
10-125 INJECTION, SOLUTION INTRAVENOUS CONTINUOUS
Status: DISCONTINUED | OUTPATIENT
Start: 2022-05-21 | End: 2022-05-21 | Stop reason: HOSPADM

## 2022-05-21 RX ORDER — ACETAMINOPHEN 325 MG/1
975 TABLET ORAL EVERY 6 HOURS
COMMUNITY
Start: 2022-05-21 | End: 2022-06-30

## 2022-05-21 RX ORDER — IBUPROFEN 200 MG
800 TABLET ORAL EVERY 8 HOURS PRN
COMMUNITY
Start: 2022-05-21 | End: 2022-06-30

## 2022-05-21 RX ORDER — AMOXICILLIN 250 MG
1 CAPSULE ORAL 2 TIMES DAILY PRN
Qty: 30 TABLET | Refills: 0 | Status: SHIPPED | OUTPATIENT
Start: 2022-05-21 | End: 2022-06-30

## 2022-05-21 RX ADMIN — IBUPROFEN 800 MG: 800 TABLET ORAL at 16:14

## 2022-05-21 RX ADMIN — ENOXAPARIN SODIUM 40 MG: 100 INJECTION SUBCUTANEOUS at 08:18

## 2022-05-21 RX ADMIN — ACETAMINOPHEN 975 MG: 325 TABLET, COATED ORAL at 14:29

## 2022-05-21 RX ADMIN — IBUPROFEN 800 MG: 800 TABLET ORAL at 08:13

## 2022-05-21 RX ADMIN — NIFEDIPINE 10 MG: 10 CAPSULE ORAL at 02:59

## 2022-05-21 RX ADMIN — OXYCODONE HYDROCHLORIDE 5 MG: 5 TABLET ORAL at 14:31

## 2022-05-21 RX ADMIN — OXYCODONE HYDROCHLORIDE 5 MG: 5 TABLET ORAL at 08:13

## 2022-05-21 RX ADMIN — ACETAMINOPHEN 975 MG: 325 TABLET, COATED ORAL at 09:44

## 2022-05-21 RX ADMIN — ACETAMINOPHEN 975 MG: 325 TABLET, COATED ORAL at 03:56

## 2022-05-21 RX ADMIN — IBUPROFEN 800 MG: 800 TABLET ORAL at 02:09

## 2022-05-21 RX ADMIN — VENLAFAXINE HYDROCHLORIDE 75 MG: 75 CAPSULE, EXTENDED RELEASE ORAL at 09:15

## 2022-05-21 NOTE — PLAN OF CARE
Patient has met goals for discharge, been seen by provider, orders received and discharge instructions received. Discharge home ambulatory. Escorted to meet spouse at car by RN

## 2022-05-21 NOTE — PLAN OF CARE
"  Problem: Plan of Care - These are the overarching goals to be used throughout the patient stay.    Goal: Plan of Care Review/Shift Note  Description: The Plan of Care Review/Shift note should be completed every shift.  The Outcome Evaluation is a brief statement about your assessment that the patient is improving, declining, or no change.  This information will be displayed automatically on your shift note.  Outcome: Ongoing, Progressing  Flowsheets (Taken 2022 1321)  Plan of Care Reviewed With: patient  Overall Patient Progress: improving  Goal: Patient-Specific Goal (Individualized)  Description: You can add care plan individualizations to a care plan. Examples of Individualization might be:  \"Parent requests to be called daily at 9am for status\", \"I have a hard time hearing out of my right ear\", or \"Do not touch me to wake me up as it startles me\".  Outcome: Ongoing, Progressing  Goal: Absence of Hospital-Acquired Illness or Injury  Outcome: Ongoing, Progressing  Goal: Optimal Comfort and Wellbeing  Outcome: Ongoing, Progressing  Goal: Readiness for Transition of Care  Outcome: Ongoing, Progressing  Flowsheets (Taken 2022 0512)  Barriers to Discharge: post-op day 1     Problem: Bleeding (Postpartum  Delivery)  Goal: Hemostasis  Outcome: Ongoing, Progressing     Problem: Infection (Postpartum  Delivery)  Goal: Absence of Infection Signs and Symptoms  Outcome: Ongoing, Progressing     Problem: Pain (Postpartum  Delivery)  Goal: Acceptable Pain Control  Outcome: Ongoing, Progressing     Problem: Postoperative Nausea and Vomiting (Postpartum  Delivery)  Goal: Nausea and Vomiting Relief  Outcome: Ongoing, Progressing     Problem: Postoperative Urinary Retention (Postpartum  Delivery)  Goal: Effective Urinary Elimination  Outcome: Ongoing, Progressing   Tacora's VS and post-op/post-partum assessment are WNL per previous RN. Alternating tylenol and Ibuprofen for " incisional pain with good pain control. See doc flow sheets. Goal is for Tacora's VS and assessments to remian WNL on night shift.

## 2022-05-21 NOTE — PLAN OF CARE
Problem: Plan of Care - These are the overarching goals to be used throughout the patient stay.    Goal: Plan of Care Review/Shift Note  Description: The Plan of Care Review/Shift note should be completed every shift.  The Outcome Evaluation is a brief statement about your assessment that the patient is improving, declining, or no change.  This information will be displayed automatically on your shift note.  Outcome: Ongoing, Progressing   Vitals postpartum have been wdl exception of one reading around 0800. Patient desires discharge today. Orders approved by MD and plan is in progress. Patient has appointment on Monday at clinic and can use it for BP recheck and baby visit. Discharge instructions printed and reviewed. Meds to be picked up at patients pharmacy. Plan discharge later today.

## 2022-05-21 NOTE — PROGRESS NOTES
2 severe level BPs 170s/90s  No HA, visual changes, RUQ pain  Abd nT  DTRs 1+  Will check HELLP labs, treat with  Nifedipine and start an IV. Will hold on Mag unless labs abnormal or BPs  Don't respond quickly to Nifedipine.

## 2022-05-21 NOTE — DISCHARGE SUMMARY
HOSPITAL DISCHARGE SUMMARY -  Birth    Patient Name: Luis Antonio Laura   YOB: 1990  Age: 31 year old  Medical Record Number: [id number]  Primary Physician: Medina Melton    Admission Date:  2022  Delivery Date:   2022  Gestational Age at Delivery:  37w5d   Discharge Date:  2022    REASON FOR ADMISSION: Labor and Delivery    DIAGNOSIS:    1.  Birth secondary to fetal intolerance to labor  2. APGARS at 1 min 8, at 5 min 9  3. Baby's Weight 6 lbs 1oz  4. Cholestasis of pregnancy  5. Morbid Obesity  6. COVID-19 infection  7. Elevated blood pressure postpartum  8. Acute blood loss anemia  9. Gestational Diabetes      Conditions complicating antepartum/postpartum:  High Risk Pregnancy:  history of cholestasis, Morbid Obesity, COVID-19 infection, Elevated blood pressure postpartum, Acute blood loss anemia, Gestational Diabetes    PROCEDURES:  Lower transverse     SIGNIFICANT DIAGNOSTIC PROCEDURES:   none    CONSULTS: In house OBGYN consult for high risk pregnancy and then subsequent need for  section delivery    HISTORY OF PRESENT ILLNESS AND HOSPITAL COURSE: This is a 31 year old   female who underwent  section without complication secondary to fetal intolerance to labor . A tubal ligation was performed at the time of  section. She was found to be COVID 19 positive with this admission. Postoperative course was remarkable for acute blood loss anemia and elevated blood pressure requiring oral nifedipine. On the day of discharge patient was tolerating diet, pain was controlled with oral medications, she was voiding and passing gas. Home care visit to be arranged for blood pressure monitoring and incision check.    LABS:  Lab Results   Component Value Date    HGB 9.2 (L) 2022     Pre-operative hgb : 11.4      PENDING LABS:  none    DISPOSITION:  Home    DISCHARGE CONDITION: Good/Stable    DISCHARGE MEDICATIONS:      Review of your  medicines      START taking      Dose / Directions   acetaminophen 325 MG tablet  Commonly known as: TYLENOL      Dose: 975 mg  Take 3 tablets (975 mg) by mouth every 6 hours  Refills: 0     ibuprofen 200 MG tablet  Commonly known as: ADVIL/MOTRIN      Dose: 800 mg  Take 4 tablets (800 mg) by mouth every 8 hours as needed for mild pain  Refills: 0     oxyCODONE 5 MG tablet  Commonly known as: ROXICODONE      Dose: 5 mg  Take 1 tablet (5 mg) by mouth every 4 hours as needed for moderate to severe pain  Quantity: 12 tablet  Refills: 0     senna-docusate 8.6-50 MG tablet  Commonly known as: SENOKOT-S/PERICOLACE      Dose: 1 tablet  Take 1 tablet by mouth 2 times daily as needed for constipation  Quantity: 30 tablet  Refills: 0        CONTINUE these medicines which have NOT CHANGED      Dose / Directions   alcohol swab prep pads  Used for: Gestational diabetes mellitus (GDM), antepartum, gestational diabetes method of control unspecified      Use to swab area of injection/stephen as directed.  Quantity: 100 each  Refills: 6     blood glucose monitoring meter device kit  Commonly known as: NO BRAND SPECIFIED  Used for: Gestational diabetes mellitus (GDM), antepartum, gestational diabetes method of control unspecified      Use to test blood sugar 4 times daily or as directed. Preferred blood glucose meter OR supplies to accompany: Blood Glucose Monitor Brands: per insurance.  Quantity: 1 kit  Refills: 0     blood glucose test strip  Commonly known as: NO BRAND SPECIFIED  Used for: Gestational diabetes mellitus (GDM), antepartum, gestational diabetes method of control unspecified      Use to test blood sugar 4 times daily or as directed. To accompany: Blood Glucose Monitor Brands: per insurance.  Quantity: 100 strip  Refills: 6     ferrous sulfate 325 (65 Fe) MG tablet  Commonly known as: FEROSUL  Used for: Anemia, unspecified type      Dose: 325 mg  Take 1 tablet (325 mg) by mouth daily (with breakfast)  Quantity: 60  tablet  Refills: 3     OLANZapine 10 MG tablet  Commonly known as: zyPREXA  Indication: Major Depressive Disorder      Dose: 10 mg  Take 10 mg by mouth At Bedtime  Refills: 0     Prenatal (w/Iron & FA) 27-0.8 MG Tabs      Dose: 1 tablet  Take 1 tablet by mouth daily  Refills: 0     thin lancets  Commonly known as: NO BRAND SPECIFIED  Used for: Gestational diabetes mellitus (GDM), antepartum, gestational diabetes method of control unspecified      Use with lanceting device. To accompany: Blood Glucose Monitor Brands: per insurance.  Quantity: 200 each  Refills: 11     venlafaxine 75 MG tablet  Commonly known as: EFFEXOR      Dose: 75 mg  Take 75 mg by mouth 3 times daily  Refills: 0     Ventolin  (90 Base) MCG/ACT inhaler  Used for: Cough, Encounter for medication refill  Generic drug: albuterol      INHALE 1 TO 2 PUFFS INTO THE LUNGS EVERY 6 HOURS AS NEEDED FOR SHORTNESS OF BREATH OR DIFFICULT BREATHING OR WHEEZING  Quantity: 18 g  Refills: 3           Where to get your medicines      These medications were sent to [id number] - [place], [place] - [address] AT [facility] & [address]  [address], [place] [address]    Phone: [phone]     oxyCODONE 5 MG tablet    senna-docusate 8.6-50 MG tablet     Some of these will need a paper prescription and others can be bought over the counter. Ask your nurse if you have questions.    You don't need a prescription for these medications    acetaminophen 325 MG tablet    ibuprofen 200 MG tablet         DISCHARGE PLAN:   - Follow up with  Dr. Brink, in 1 week  - Take medication as prescribed  - Physical activity: As tolerated, no heavy lifting. Pelvic rest.  - Diet:  Regular  - Medication:  Please see MAR  - Warning signs discussed with patient about when to call the clinic/hospital  - All questions and concerns were answered for the patient prior to discharge.         Savita Humphrey DO on 5/21/2022 at 1:56 PM      I saw the patient on  the date of discharge  Total time spent for discharge on date of discharge: 30 minutes    Physician(s) in addition to primary physician who should receive a copy:  CC1: Elena Brink MD

## 2022-05-21 NOTE — PLAN OF CARE
Problem: Plan of Care - These are the overarching goals to be used throughout the patient stay.    Goal: Plan of Care Review/Shift Note  Description: The Plan of Care Review/Shift note should be completed every shift.  The Outcome Evaluation is a brief statement about your assessment that the patient is improving, declining, or no change.  This information will be displayed automatically on your shift note.  5/21/2022 0625 by Génesis Muñoz, RN  Flowsheets (Taken 5/21/2022 0625)  Plan of Care Reviewed With: patient  5/20/2022 2259 by Génesis Muñoz, RN  Outcome: Ongoing, Progressing  Flowsheets (Taken 5/20/2022 2259)  Plan of Care Reviewed With: patient  Overall Patient Progress: improving   Luis Antonio's BP was elevated X2. Denies s/s of pre-eclampsia. Dr. Wasserman notified and BP protocol orders entered and initiated.  Labs drawn. Abnormals reported to DR. Wasserman. Rechecking a couple labs at 0700. She remains asymptomatic at this time. Nifedipine given X1. BP's now WNL. Continue to monitor per elevated BP protocol. See doc flow sheets. Goal is for Luis Antonio's BP to remain WNL.

## 2022-05-24 LAB
ATRIAL RATE - MUSE: 92 BPM
DIASTOLIC BLOOD PRESSURE - MUSE: NORMAL MMHG
INTERPRETATION ECG - MUSE: NORMAL
P AXIS - MUSE: 40 DEGREES
PR INTERVAL - MUSE: 160 MS
QRS DURATION - MUSE: 88 MS
QT - MUSE: 358 MS
QTC - MUSE: 442 MS
R AXIS - MUSE: 43 DEGREES
SYSTOLIC BLOOD PRESSURE - MUSE: NORMAL MMHG
T AXIS - MUSE: 19 DEGREES
VENTRICULAR RATE- MUSE: 92 BPM

## 2022-06-02 ENCOUNTER — TELEPHONE (OUTPATIENT)
Dept: FAMILY MEDICINE | Facility: CLINIC | Age: 32
End: 2022-06-02

## 2022-06-02 NOTE — TELEPHONE ENCOUNTER
Reason for call:  Patient reporting a symptom    Symptom or request: Bleeding/drainaige from c section incision x 1 day    Headaches x 2 to 3 days    Swelling of R leg x 1 week    Have you been treated for this before? No    Additional comments: Pt request, Would like Dr June to call her back ASAP    Phone Number patient can be reached at:  Home number on file [phone] (home)    Best Time:  ANY    Can we leave a detailed message on this number:  YES    Call taken on 6/2/2022 at 10:09 AM by Nan Breen

## 2022-06-06 NOTE — TELEPHONE ENCOUNTER
Dr. June,     An update on patient condition.  Spoke to patient on the phone to assess  concern.  Patient reported no drainage, bleeding, fever, odor, redness or pain to  area.  Intermittent headache with Tylenol taking with effective relief.  Swelling to R leg has improved.  Denies other concern symptoms.  Encouraged patient to call with new development or worsening symptoms.     HALIE RichterN, RN  [facility]

## 2022-06-30 ENCOUNTER — PRENATAL OFFICE VISIT (OUTPATIENT)
Dept: FAMILY MEDICINE | Facility: CLINIC | Age: 32
End: 2022-06-30
Payer: COMMERCIAL

## 2022-06-30 VITALS
RESPIRATION RATE: 16 BRPM | HEIGHT: 62 IN | BODY MASS INDEX: 44.21 KG/M2 | WEIGHT: 240.25 LBS | HEART RATE: 106 BPM | DIASTOLIC BLOOD PRESSURE: 64 MMHG | OXYGEN SATURATION: 98 % | SYSTOLIC BLOOD PRESSURE: 102 MMHG

## 2022-06-30 DIAGNOSIS — Z87.59 HISTORY OF CHOLESTASIS DURING PREGNANCY: ICD-10-CM

## 2022-06-30 DIAGNOSIS — Z98.891 STATUS POST PRIMARY LOW TRANSVERSE CESAREAN SECTION: ICD-10-CM

## 2022-06-30 DIAGNOSIS — Z87.19 HISTORY OF CHOLESTASIS DURING PREGNANCY: ICD-10-CM

## 2022-06-30 DIAGNOSIS — Z86.32 HISTORY OF DIET CONTROLLED GESTATIONAL DIABETES MELLITUS (GDM): ICD-10-CM

## 2022-06-30 DIAGNOSIS — F33.2 SEVERE EPISODE OF RECURRENT MAJOR DEPRESSIVE DISORDER, WITHOUT PSYCHOTIC FEATURES (H): ICD-10-CM

## 2022-06-30 DIAGNOSIS — Z12.4 PAP SMEAR FOR CERVICAL CANCER SCREENING: ICD-10-CM

## 2022-06-30 DIAGNOSIS — D64.9 ANEMIA, UNSPECIFIED TYPE: ICD-10-CM

## 2022-06-30 PROBLEM — U07.1 INFECTION DUE TO 2019 NOVEL CORONAVIRUS: Status: RESOLVED | Noted: 2022-05-19 | Resolved: 2022-06-30

## 2022-06-30 PROBLEM — O26.649 CHOLESTASIS DURING PREGNANCY: Status: RESOLVED | Noted: 2022-05-19 | Resolved: 2022-06-30

## 2022-06-30 PROBLEM — O24.410 DIET CONTROLLED GESTATIONAL DIABETES MELLITUS (GDM) IN THIRD TRIMESTER: Status: RESOLVED | Noted: 2022-03-25 | Resolved: 2022-06-30

## 2022-06-30 PROBLEM — Z34.90 ENCOUNTER FOR INDUCTION OF LABOR: Status: RESOLVED | Noted: 2022-05-18 | Resolved: 2022-06-30

## 2022-06-30 LAB
ALBUMIN SERPL BCG-MCNC: 3.3 G/DL (ref 3.5–5.2)
ALP SERPL-CCNC: 79 U/L (ref 35–104)
ALT SERPL W P-5'-P-CCNC: 14 U/L (ref 10–35)
AST SERPL W P-5'-P-CCNC: 21 U/L (ref 10–35)
BILIRUB DIRECT SERPL-MCNC: <0.2 MG/DL (ref 0–0.3)
BILIRUB SERPL-MCNC: 0.6 MG/DL
ERYTHROCYTE [DISTWIDTH] IN BLOOD BY AUTOMATED COUNT: 12.1 % (ref 10–15)
HBA1C MFR BLD: 5.4 % (ref 0–5.6)
HCT VFR BLD AUTO: 34.5 % (ref 35–47)
HGB BLD-MCNC: 11.1 G/DL (ref 11.7–15.7)
MCH RBC QN AUTO: 28.7 PG (ref 26.5–33)
MCHC RBC AUTO-ENTMCNC: 32.2 G/DL (ref 31.5–36.5)
MCV RBC AUTO: 89 FL (ref 78–100)
PLATELET # BLD AUTO: 467 10E3/UL (ref 150–450)
PROT SERPL-MCNC: 8.3 G/DL (ref 6.4–8.3)
RBC # BLD AUTO: 3.87 10E6/UL (ref 3.8–5.2)
WBC # BLD AUTO: 7.6 10E3/UL (ref 4–11)

## 2022-06-30 PROCEDURE — 83036 HEMOGLOBIN GLYCOSYLATED A1C: CPT | Performed by: FAMILY MEDICINE

## 2022-06-30 PROCEDURE — 80076 HEPATIC FUNCTION PANEL: CPT | Performed by: FAMILY MEDICINE

## 2022-06-30 PROCEDURE — 36415 COLL VENOUS BLD VENIPUNCTURE: CPT | Performed by: FAMILY MEDICINE

## 2022-06-30 PROCEDURE — 85027 COMPLETE CBC AUTOMATED: CPT | Performed by: FAMILY MEDICINE

## 2022-06-30 ASSESSMENT — PATIENT HEALTH QUESTIONNAIRE - PHQ9
SUM OF ALL RESPONSES TO PHQ QUESTIONS 1-9: 13
10. IF YOU CHECKED OFF ANY PROBLEMS, HOW DIFFICULT HAVE THESE PROBLEMS MADE IT FOR YOU TO DO YOUR WORK, TAKE CARE OF THINGS AT HOME, OR GET ALONG WITH OTHER PEOPLE: VERY DIFFICULT
SUM OF ALL RESPONSES TO PHQ QUESTIONS 1-9: 13

## 2022-06-30 NOTE — PROGRESS NOTES
Assessment/Plan:     Luis Antonio was seen today for postpartum care.    Diagnoses and all orders for this visit:    Routine postpartum follow-up    Severe episode of recurrent major depressive disorder, without psychotic features (H): PHQ-9 and [place] completed. Denies SI. She has therapist and seeing weekly or every other week- plans to schedule follow-up with psychiatrist to discuss medication adjustment.     Status post primary low transverse  section    Anemia, unspecified type: Check CBC. On PNV. Has stopped iron supplement.  -     CBC with platelets; Future  -     CBC with platelets    History of cholestasis during pregnancy: Elevated liver enzyme- recheck liver profile today.  -     Hepatic panel (Albumin, ALT, AST, Bili, Alk Phos, TP); Future  -     Hepatic panel (Albumin, ALT, AST, Bili, Alk Phos, TP)    Pap smear for cervical cancer screening: Declines today due to having her  with her at appointment- scheduled follow-up for pap    History of diet controlled gestational diabetes mellitus (GDM): Unable to do 2 hour GTT today- A1C drawn.   -     Hemoglobin A1c; Future  -     Hemoglobin A1c        Anemia:  Hemoglobin checked today  Breastfeeding/Bottle feeding:  Pt is breast and formula feeding baby  Contraception:   tubal ligation  Depression:   See [facility]  Exercise:   Encouraged increasing exercise based on how she is feeling.  Healthcare maintenance:   Needs Pap Smear; declined today - scheduled follow-up  Immunizations:    Immunizations up to date               Subjective:      Luis Antonio Laura is a 31 year old female who presents for a postpartum visit.   She is postpartum following a  due to non-reassuring fetal heart tones  I have fully reviewed the prenatal and intrapartum course.   Delivery notes below  Postpartum course has been uncomplicated  Baby's course has been Uncomplicated.  Periods: stopped 3 weeks ago.   Patient's last menstrual period was  "2021.   Bowel or bladder concerns: constipation after birth- resolved.   Patient has not resumed intercourse.    [place]  Depression Scale: see flowsheet    Last hgb on file:    Lab Results   Component Value Date    HGB 9.2 (L) 2022      Wt Readings from Last 3 Encounters:   22 109 kg (240 lb 4 oz)   22 120.7 kg (266 lb)   22 121.1 kg (267 lb)       The following portions of the patient's history were reviewed and updated as appropriate: allergies, current medications and problem list     OB History    Para Term  AB Living   3 3 1 2 0 3   SAB IAB Ectopic Multiple Live Births   0 0 0 0 3      # Outcome Date GA Lbr Burak/2nd Weight Sex Delivery Anes PTL Lv   3 Term 22 37w5d  2.76 kg (6 lb 1.4 oz) F CS-LTranv EPI N PORSHA      Name: ADAMFEMALE-JAELRADHA      Apgar1: 8  Apgar5: 9   2  10/16/18 36w6d 02:07  00:18 2.41 kg (5 lb 5 oz) M Vag-Spont  N PORSHA      Name: Xavion      Apgar1: 9  Apgar5: 9   1  07/05/10 36w2d  2.353 kg (5 lb 3 oz) M Vag-Spont   PORSHA      Name: Sadiq     Information for the patient's :  Kim Catherine [[id number]]   Kim Catherine       Objective:      /64   Pulse 106   Resp 16   Ht 1.575 m (5' 2\")   Wt 109 kg (240 lb 4 oz)   LMP 2021   SpO2 98%   BMI 43.94 kg/m      Physical Exam:  General Appearance: Alert, no distress  Lungs: Clear to auscultation bilaterally, respirations unlabored  Heart: Regular rate and rhythm, S1 and S2 normal, no murmur, rub, or gallop  Abdomen: Soft, non-tender, incision intact without drainage or surrounding erythema      "

## 2022-08-17 ENCOUNTER — OFFICE VISIT (OUTPATIENT)
Dept: FAMILY MEDICINE | Facility: CLINIC | Age: 32
End: 2022-08-17
Payer: COMMERCIAL

## 2022-08-17 VITALS
BODY MASS INDEX: 42.33 KG/M2 | DIASTOLIC BLOOD PRESSURE: 70 MMHG | HEART RATE: 108 BPM | OXYGEN SATURATION: 98 % | WEIGHT: 230 LBS | RESPIRATION RATE: 16 BRPM | HEIGHT: 62 IN | SYSTOLIC BLOOD PRESSURE: 124 MMHG

## 2022-08-17 DIAGNOSIS — Z12.4 CERVICAL CANCER SCREENING: Primary | ICD-10-CM

## 2022-08-17 DIAGNOSIS — N94.6 DYSMENORRHEA: ICD-10-CM

## 2022-08-17 DIAGNOSIS — Z11.3 SCREEN FOR STD (SEXUALLY TRANSMITTED DISEASE): ICD-10-CM

## 2022-08-17 PROCEDURE — 87591 N.GONORRHOEAE DNA AMP PROB: CPT | Performed by: FAMILY MEDICINE

## 2022-08-17 PROCEDURE — 99214 OFFICE O/P EST MOD 30 MIN: CPT | Performed by: FAMILY MEDICINE

## 2022-08-17 PROCEDURE — 87624 HPV HI-RISK TYP POOLED RSLT: CPT | Performed by: FAMILY MEDICINE

## 2022-08-17 PROCEDURE — G0145 SCR C/V CYTO,THINLAYER,RESCR: HCPCS | Performed by: FAMILY MEDICINE

## 2022-08-17 PROCEDURE — 87491 CHLMYD TRACH DNA AMP PROBE: CPT | Performed by: FAMILY MEDICINE

## 2022-08-17 RX ORDER — NAPROXEN 500 MG/1
500 TABLET ORAL 2 TIMES DAILY WITH MEALS
Qty: 60 TABLET | Refills: 3 | Status: SHIPPED | OUTPATIENT
Start: 2022-08-17

## 2022-08-17 ASSESSMENT — PATIENT HEALTH QUESTIONNAIRE - PHQ9
10. IF YOU CHECKED OFF ANY PROBLEMS, HOW DIFFICULT HAVE THESE PROBLEMS MADE IT FOR YOU TO DO YOUR WORK, TAKE CARE OF THINGS AT HOME, OR GET ALONG WITH OTHER PEOPLE: VERY DIFFICULT
SUM OF ALL RESPONSES TO PHQ QUESTIONS 1-9: 12
SUM OF ALL RESPONSES TO PHQ QUESTIONS 1-9: 12

## 2022-08-17 NOTE — PROGRESS NOTES
Luis Antonio was seen today for gyn exam and abdominal cramping.    Diagnoses and all orders for this visit:    Cervical cancer screening: No history of abnormal pap. If normal, repeat pap in 5 years.  -     Pap Screen with HPV - recommended age 30 - 65 years    Dysmenorrhea: Chronic issue- resolved with mirena IUD previously. She did have tubal ligation so does not need IUD for contraception, but discussed option of using to treat her dysmenorrhea. Try NSAIDS for symptomatic treatment- will schedule IUD placement.  -     naproxen (NAPROSYN) 500 MG tablet; Take 1 tablet (500 mg) by mouth 2 times daily (with meals)    Screen for STD (sexually transmitted disease): Asymptomatic screening.  -     Neisseria gonorrhoeae PCR - Clinic Collect  -     Chlamydia trachomatis PCR - Clinic Collect          Subjective   Luis Antonio is a 32 year old, presenting for the following health issues:  Gyn Exam and Abdominal Cramping      History of Present Illness       Reason for visit:  Check up    She eats 2-3 servings of fruits and vegetables daily.She consumes 4 sweetened beverage(s) daily.She exercises with enough effort to increase her heart rate 10 to 19 minutes per day.  She exercises with enough effort to increase her heart rate 3 or less days per week. She is missing 2 dose(s) of medications per week.    Today's PHQ-9         PHQ-9 Total Score: 12    PHQ-9 Q9 Thoughts of better off dead/self-harm past 2 weeks :   Not at all    How difficult have these problems made it for you to do your work, take care of things at home, or get along with other people: Very difficult     Has psychiatrist, therapist  Dysmenorrhea- this is chronic issue. Always had painful periods. Has used naproxen previously- this worked better than ibuprofen. She is not interested in trying OCPs. She had tubal ligation recently. She did have mirena IUD previously and that helped her painful menses since she was amenorrheic. She is interested in trying this  "again.          Review of Systems         Objective    /70   Pulse 108   Resp 16   Ht 1.575 m (5' 2\")   Wt 104.3 kg (230 lb)   LMP  (LMP Unknown)   SpO2 98%   BMI 42.07 kg/m    Body mass index is 42.07 kg/m .  Physical Exam    (female): External genitalia is without lesions. Introitus is normal, vaginal walls pink and moist without lesions or evidence of trauma. No cervical lesions or discharge                  .  ..  "

## 2022-08-18 LAB
C TRACH DNA SPEC QL NAA+PROBE: NEGATIVE
N GONORRHOEA DNA SPEC QL NAA+PROBE: NEGATIVE

## 2022-08-21 LAB
BKR LAB AP GYN ADEQUACY: NORMAL
BKR LAB AP GYN INTERPRETATION: NORMAL
BKR LAB AP HPV REFLEX: NORMAL
BKR LAB AP PREVIOUS ABNORMAL: NORMAL
PATH REPORT.COMMENTS IMP SPEC: NORMAL
PATH REPORT.COMMENTS IMP SPEC: NORMAL
PATH REPORT.RELEVANT HX SPEC: NORMAL

## 2022-08-23 LAB
HUMAN PAPILLOMA VIRUS 16 DNA: NEGATIVE
HUMAN PAPILLOMA VIRUS 18 DNA: NEGATIVE
HUMAN PAPILLOMA VIRUS FINAL DIAGNOSIS: NORMAL
HUMAN PAPILLOMA VIRUS OTHER HR: NEGATIVE

## 2022-09-25 ENCOUNTER — HEALTH MAINTENANCE LETTER (OUTPATIENT)
Age: 32
End: 2022-09-25

## 2022-10-06 ENCOUNTER — OFFICE VISIT (OUTPATIENT)
Dept: FAMILY MEDICINE | Facility: CLINIC | Age: 32
End: 2022-10-06
Payer: COMMERCIAL

## 2022-10-06 VITALS
HEART RATE: 91 BPM | SYSTOLIC BLOOD PRESSURE: 118 MMHG | DIASTOLIC BLOOD PRESSURE: 78 MMHG | BODY MASS INDEX: 44.99 KG/M2 | HEIGHT: 62 IN | OXYGEN SATURATION: 99 % | RESPIRATION RATE: 16 BRPM | WEIGHT: 244.5 LBS | TEMPERATURE: 98.6 F

## 2022-10-06 DIAGNOSIS — N94.6 DYSMENORRHEA: Primary | ICD-10-CM

## 2022-10-06 PROCEDURE — 99207 PR NO CHARGE LOS: CPT | Mod: 25 | Performed by: FAMILY MEDICINE

## 2022-10-06 ASSESSMENT — PATIENT HEALTH QUESTIONNAIRE - PHQ9
SUM OF ALL RESPONSES TO PHQ QUESTIONS 1-9: 7
SUM OF ALL RESPONSES TO PHQ QUESTIONS 1-9: 7
10. IF YOU CHECKED OFF ANY PROBLEMS, HOW DIFFICULT HAVE THESE PROBLEMS MADE IT FOR YOU TO DO YOUR WORK, TAKE CARE OF THINGS AT HOME, OR GET ALONG WITH OTHER PEOPLE: VERY DIFFICULT

## 2022-10-06 NOTE — PROGRESS NOTES
No charge visit- patient had to wait and elected to decline IUD for menorrhagia/dysmenorrhia management due to childcare issues- she needed to go  her children.    Rescheduled in mid-December.

## 2022-10-12 NOTE — PROGRESS NOTES
[facility]    PATIENT NAME:  Nima Bailey    PATIENT :  1935    DATE OF PROCEDURE:  10/12/22    PREOPERATIVE DIAGNOSIS:  Left comminuted and displaced proximal humerus fracture. POSTOPERATIVE DIAGNOSIS:  same     OPERATION PERFORMED:  Left reverse shoulder arthroplasty with tuberosity fixation. ANESTHESIA:  General endotracheal with preoperative interscalene nerve block. SURGEON:  Bo Mcdermott MD     FIRST ASSISTANT:  Markos Madison. BLOOD LOSS:  200 mL. COMPLICATIONS:  None. IMPLANTS:  Tornier shoulder arthroplasty system. 1.  25 mm standard glenoid baseplate. 2.  6.5 mm x 35 mm central screw. 3.  5.0 mm peripheral screws, x 2.  4.  36 mm standard glenosphere. 5.  Size 4B long humeral stem, cemented. 6.  Reverse humeral tray, centered, +6 mm.  7.  36 mm reverse polyethylene insert, +9 mm, angle B. DETAILS OF PROCEDURE:    The patient was brought back to the OR and transferred on to the operating room table with the Tenet shoulder positioner in place. General anesthesia was administered by way of endotracheal tube. The Tenet attachment was raised for beach chair positioning. The torso was secured with lateral supports and the head and neck were secured in the neutral position using the facemask. All pressure points were well padded and tension was relieved from the sciatic nerves using the knee bolster. The entire upper extremity was subsequently prepped and draped in the usual sterile fashion, including the anterior chest wall, base of the neck, and posterior shoulder/scapula. Jessica Fearing was used to occlude off the axillary region and the edges of the drape. A full time-out was performed with all parties in agreement. The patient did receive cefazolin for antibiotic prophylaxis. The patient was also given IV tranexamic acid. Standard deltopectoral skin incision was made and carried down onto the deltoid muscle fibers.   Skin flap was elevated medially Assumed care of patient at 1300. Patient very surprised to learn of her positive Covid status. States she has had a mild cough for the past few days but thought it was due to seasonal allergies. Discussed Pitocin induction for cholestasis per Dr. June's recommendation, patient agreeable to plan. Offered Vistaril for itching but patient declines at this time. Pitocin started at 1420.     until the fat stripe was visualized. The cephalic vein was mobilized proximally and distally and retracted medially with the pectoralis major. The interval was now entered and the clavipectoral fascia was incised just lateral to the conjoint tendon and the deltoid retractor was inserted. The superior 1 cm of the pectoralis major tendon was released to facilitate exposure and the coracoacromial ligament was preserved. The anterior humeral circumflex vessels were already disrupted. The long head of the biceps was palpated and unroofed, extending proximally through the rotator interval.  The long head of the biceps was tenodesed to the pectoralis major tendon and divided superior to that. The fracture planes were developed with an osteotome. The lesser tuberosity and subscapularis was identified and tagged with a Vicryl. The humeral head fracture fragment was retrieved and saved on the back table. The posterosuperior cuff and the greater tuberosity fracture fragment were identified and mobilized. Two NiceLoops and one Ethibond suture were passed through the musculotendinous junction of the posterosuperior cuff, tagged, and saved for later use. The superior and middle glenohumeral ligaments were released to facilitate mobilization of the subscapularis. Next, the glenoid was exposed with anterior, superior, and posterior retractors. The inferior glenohumeral ligament was released from the glenoid and a 360 degree labrectomy was completed. The remaining long head of the biceps remnant was saved. Inferior glenoid capsular release was performed from the 3 to 9 o'clock positions to prevent impingement and dislocation. Next, using a Cortez elevator, the remaining glenoid articular cartilage was removed and the center point was marked out with the Bovie.   Based on the patient's preoperative imaging and templating, the guidewire was inserted in neutral version and 5 degrees of inferior tilt relative to the patient's native glenoid face. Once the guidewire was inserted to the appropriate position, the glenoid was reamed. A subchondral smile was established down to bleeding bone. The hole for the post was created with the cannulated drill, the guidewire was removed, and the central screw hole was drilled. The formal glenoid baseplate and the center screw was then inserted which demonstrated great bony purchase. The glenoid baseplate was fully seated onto the native glenoid by direct visualization. Superior and inferior locking screws were inserted for additional fixation. The manual peripheral reamer was then used to remove any surrounding bony debris and soft tissue to ensure that the glenosphere would fully seat. Once this was completed, the glenosphere was then impacted into place. The central locking retention screw was tightened down appropriately and confirmed that the [facility] taper was appropriately engaged. Next, the proximal humerus was re-exposed. The humeral canal was sequentially broached. Broaching was performed in approximately 20 degrees of retroversion relative to the forearm axis. With the size 4 broach, good fit fixation was achieved without toggling or rotational instability. Trial reduction was performed with different trays and liners. The centered humeral tray +6mm with an angle B +9 mm insert was best.  Good soft tissue tensioning was demonstrated and good stability as well. With the arm in the adducted position, external rotation was to approximately 40 degrees without evidence of lift-off or instability. In the 90-degree abducted position, there was no evidence of instability with internal and external rotation. I then checked reduction and positioning of the greater and lesser tuberosity fracture fragments to ensure that I would be able to repair them and cover the proximal stem. I was happy with these set of trial components and the shoulder was redislocated.   The trial components were removed. More distally in the proximal humeral shaft, a drill bit was used and another NiceLoop was inserted through the hole for later vertical mattress fixation of the tuberosities. A cement restrictor was inserted, cement was mixed on the back table, pressurized into the humeral canal, and cancellous bone autograft was packed proximally for black-and-tan technique. The formal stem and tray was assembled on the back table and malleted down into the proximal humerus to the appropriate depth. Excess cement was removed and the stem was held in position until the cement had fully cured. The formal polyethylene insert was malleted down and appropriately engaged. Next, the medial limbs of the NiceLoops and Ethibond suture were passed posterior and medial to the humeral stem and tray and the reverse arthroplasty was reduced. The greater and lesser tuberosity fracture fragments were appropriately reduced and the shoulder was thoroughly irrigated with chlorhexidine irrigation. Abundant cancellous bone autograft harvested from the humeral head fracture fragment was now packed generously around the proximal coated region of the humeral stem posteriorly, laterally, and anteriorly for complete coverage. The medial limbs of the sutures were passed through the subscapularis to capture the lesser tuberosity fracture fragment. Using racking hitch technique/knots, the greater and lesser tuberosities were secured to each other for horizontal cerclage fixation. The previously passed NiceLoop in the humeral shaft was then used and passed superiorly for vertical mattress fixation of the tuberosities to the shaft. Final irrigation was performed of the surgical field. The second dose of tranexamic acid was given IV. Hemostasis was confirmed. Topical vancomycin powder was applied over the surgical field. The deltopectoral interval was closed using Vicryl sutures.   The skin was closed with inverted Vicryls and 3-0 Monocryl running subcuticular stitch. The incision was cleaned with wet and dries and then dressed with Steristrips and Mepilex. The drapes were removed. An abduction pillow and sling were applied. The patient was moved to the hospital bed, extubated, and then transferred to the PACU.      Kaleb Ryan MD

## 2022-12-11 ENCOUNTER — E-VISIT (OUTPATIENT)
Dept: URGENT CARE | Facility: CLINIC | Age: 32
End: 2022-12-11
Payer: COMMERCIAL

## 2022-12-11 DIAGNOSIS — R10.2 VAGINAL PAIN: Primary | ICD-10-CM

## 2022-12-11 PROCEDURE — 99207 PR NON-BILLABLE SERV PER CHARTING: CPT | Performed by: PHYSICIAN ASSISTANT

## 2022-12-11 NOTE — PATIENT INSTRUCTIONS
After reviewing your responses, I am unable to make a diagnosis that can be treated online.    You will not be charged for this eVisit.     We are dedicated to helping you achieve your best health and would like to see you in one of our many clinic locations - a primary care provider would be ideal for your concern.    Please use [employer] to schedule a visit with a provider or call [phone] ([phone]) to schedule at any of our locations.    Thanks for choosing?us?as your health care partner,?   ?  Elena Negrete PA-C, [facility]  12/11/2022  3:27 AM

## 2022-12-14 ENCOUNTER — OFFICE VISIT (OUTPATIENT)
Dept: FAMILY MEDICINE | Facility: CLINIC | Age: 32
End: 2022-12-14
Payer: COMMERCIAL

## 2022-12-14 VITALS
RESPIRATION RATE: 16 BRPM | BODY MASS INDEX: 50.61 KG/M2 | OXYGEN SATURATION: 98 % | SYSTOLIC BLOOD PRESSURE: 120 MMHG | TEMPERATURE: 98 F | WEIGHT: 275 LBS | HEIGHT: 62 IN | DIASTOLIC BLOOD PRESSURE: 70 MMHG | HEART RATE: 91 BPM

## 2022-12-14 DIAGNOSIS — E66.01 MORBID OBESITY (H): ICD-10-CM

## 2022-12-14 DIAGNOSIS — Z30.430 ENCOUNTER FOR INSERTION OF INTRAUTERINE CONTRACEPTIVE DEVICE: Primary | ICD-10-CM

## 2022-12-14 DIAGNOSIS — N94.6 DYSMENORRHEA: ICD-10-CM

## 2022-12-14 LAB — HCG UR QL: NEGATIVE

## 2022-12-14 PROCEDURE — 81025 URINE PREGNANCY TEST: CPT | Performed by: FAMILY MEDICINE

## 2022-12-14 PROCEDURE — 58300 INSERT INTRAUTERINE DEVICE: CPT | Performed by: FAMILY MEDICINE

## 2022-12-14 PROCEDURE — 99213 OFFICE O/P EST LOW 20 MIN: CPT | Mod: 25 | Performed by: FAMILY MEDICINE

## 2022-12-14 RX ORDER — DOXYCYCLINE 100 MG/1
CAPSULE ORAL
COMMUNITY
Start: 2022-12-11 | End: 2023-06-29

## 2022-12-14 NOTE — PROGRESS NOTES
"  Luis Antonio was seen today for contraception.    Diagnoses and all orders for this visit:    Encounter for insertion of intrauterine contraceptive device  Dysmenorrhea: Patient with history of tubal ligation for contraception; however, she continues to have painful menses. She had improvement of symptoms with mirena IUD previously. IUD placed today- see procedure note. UPT negative. Tolerated procedure well.   -     HCG qualitative urine; Future  -     levonorgestrel (MIRENA) 20 MCG/DAY IUD 20 mcg  -     INSERTION INTRAUTERINE DEVICE  -     HCG qualitative urine    Morbid obesity (H): Chronic issue taken into account but not discussed today.        Subjective   Luis Antonio is a 32 year old, presenting for the following health issues:  IUD insertion    HPI     History of painful periods  Lighter periods, lasting only 3 days  Worsening cramping about 7 days with periods  She had mirena IUD previously and this managed her symptoms  She had tubal ligation after her last pregnancy, 5/19/22.  Denies any vaginal discharge  Of note, she was seen recently due to perineal pain and was found to have cellulitis and is on doxycycline- pain has improved.    Review of Systems         Objective    /70   Pulse 91   Temp 98  F (36.7  C) (Oral)   Resp 16   Ht 1.575 m (5' 2\")   Wt 124.7 kg (275 lb)   LMP 11/16/2022 (Approximate)   SpO2 98%   BMI 50.30 kg/m    Body mass index is 50.3 kg/m .  Physical Exam   Gen: well appearing   (female): External genitalia is without lesions. Introitus is normal, vaginal walls pink and moist without lesions or evidence of trauma. No cervical motion tenderness. No cervical lesions or discharge    IUD Mirena Insertion Procedure Note:    PROCEDURE AND FINDINGS: After appropriate discussion of risks and benefits of IUD placement, written informed consent was obtained.  Urine pregnancy test was negative.  The patient was placed in the dorsal lithotomy position, and a sterile speculum was inserted.  " The cervix was visualized and prepped with iodine.    A tenaculum was applied to the anterior lip of the cervix.   A sound was placed through the cervical os in sterile fashion, and the uterus sounded to 8cm. The IUD was loaded in the applicator in the usual fashion and the indicator placed according to the sound.  The applicator was inserted into the cervix and the intrauterine device placed high in the endometrial cavity.  The applicator was withdrawn and the strings trimmed to aprroximately 3 cm.  The patient tolerated the procedure well with no complications.    CONCLUSIONS/FOLLOW-UP:    IUD Insertion:  Mirena.  Patient instructed to check for strings monthly.  She is given a card indicating the date of placement and date by which the IUD should be removed.      Elena June MD

## 2022-12-26 ENCOUNTER — E-VISIT (OUTPATIENT)
Dept: URGENT CARE | Facility: CLINIC | Age: 32
End: 2022-12-26
Payer: COMMERCIAL

## 2022-12-26 DIAGNOSIS — R05.9 COUGH, UNSPECIFIED TYPE: Primary | ICD-10-CM

## 2022-12-26 PROCEDURE — 99207 PR NON-BILLABLE SERV PER CHARTING: CPT | Performed by: FAMILY MEDICINE

## 2022-12-26 NOTE — PATIENT INSTRUCTIONS
Dear Luis Antonio Laura,    We are sorry you are not feeling well. Based on the responses you provided, it is recommended that you be seen in-person in urgent care so we can better evaluate your symptoms. Please click here to find the nearest urgent care location to you.   You will not be charged for this Visit. Thank you for trusting us with your care.    Felisa Bacon MD

## 2023-03-13 ENCOUNTER — E-VISIT (OUTPATIENT)
Dept: URGENT CARE | Facility: CLINIC | Age: 33
End: 2023-03-13
Payer: COMMERCIAL

## 2023-03-13 DIAGNOSIS — H10.30 ACUTE BACTERIAL CONJUNCTIVITIS, UNSPECIFIED LATERALITY: Primary | ICD-10-CM

## 2023-03-13 PROCEDURE — 99421 OL DIG E/M SVC 5-10 MIN: CPT | Performed by: NURSE PRACTITIONER

## 2023-03-13 RX ORDER — POLYMYXIN B SULFATE AND TRIMETHOPRIM 1; 10000 MG/ML; [USP'U]/ML
SOLUTION OPHTHALMIC
Qty: 10 ML | Refills: 0 | Status: SHIPPED | OUTPATIENT
Start: 2023-03-13 | End: 2023-03-20

## 2023-03-13 NOTE — PATIENT INSTRUCTIONS
Thank you for choosing us for your care. I have placed an order for a prescription so that you can start treatment. View your full visit summary for details by clicking on the link below. Your pharmacist will able to address any questions you may have about the medication.     If you re not feeling better within 2-3 days, please schedule an appointment.  You can schedule an appointment right here in [facility], or call [phone]  If the visit is for the same symptoms as your eVisit, we ll refund the cost of your eVisit if seen within seven days.      Bacterial Conjunctivitis    You have an infection in the membranes covering the white part of the eye. This part of the eye is called the conjunctiva. The infection is called conjunctivitis. The most common symptoms of conjunctivitis include a thick, pus-like discharge from the eye, swollen eyelids, redness, eyelids sticking together upon awakening, and a gritty or scratchy feeling in the eye. Your infection was caused by bacteria. It may be treated with medicine. With treatment, the infection takes about 7 to 10 days to resolve.   Home care    Use prescribed antibiotic eye drops or ointment as directed to treat the infection.    Apply a warm compress (towel soaked in warm water) to the affected eye 3 to 4 times a day. Do this just before applying medicine to the eye.    Use a warm, wet cloth to wipe away crusting of the eyelids in the morning. This is caused by mucus drainage during the night. You may also use saline irrigating solution or artificial tears to rinse away mucus in the eye. Do not put a patch over the eye.    Wash your hands before and after touching the infected eye. This is to prevent spreading the infection to the other eye, and to other people. Don't share your towels or washcloths with others.    You may use acetaminophen or ibuprofen to control pain, unless another medicine was prescribed. Talk with your healthcare provider before using these  medicines if you have chronic liver or kidney disease. Also talk with your provider if you have ever had a stomach ulcer or digestive bleeding.    Don't wear contact lenses until your eyes have healed and all symptoms are gone.    Follow-up care  Follow up with your healthcare provider, or as advised.  When to seek medical advice  Call your healthcare provider right away if any of these occur:    Worsening vision    Increasing pain in the eye    Increasing swelling or redness of the eyelid    Redness spreading around the eye  [employer] last reviewed this educational content on 4/1/2020 2000-2021 [employer]. All rights reserved. This information is not intended as a substitute for professional medical care. Always follow your healthcare professional's instructions.

## 2023-04-24 ENCOUNTER — E-VISIT (OUTPATIENT)
Dept: URGENT CARE | Facility: CLINIC | Age: 33
End: 2023-04-24
Payer: COMMERCIAL

## 2023-04-24 DIAGNOSIS — R21 RASH: Primary | ICD-10-CM

## 2023-04-24 PROCEDURE — 99207 PR NON-BILLABLE SERV PER CHARTING: CPT | Performed by: FAMILY MEDICINE

## 2023-04-24 NOTE — PATIENT INSTRUCTIONS
Dear Luis Antonio Laura,    We are sorry you are not feeling well. Based on the responses you provided, it is recommended that you be seen in-person in urgent care so we can better evaluate your symptoms. Please click here to find the nearest urgent care location to you.   You will not be charged for this Visit. Thank you for trusting us with your care.    BRYN GILMAN CNP

## 2023-04-25 ENCOUNTER — TRANSFERRED RECORDS (OUTPATIENT)
Dept: HEALTH INFORMATION MANAGEMENT | Facility: CLINIC | Age: 33
End: 2023-04-25
Payer: COMMERCIAL

## 2023-05-13 ENCOUNTER — HEALTH MAINTENANCE LETTER (OUTPATIENT)
Age: 33
End: 2023-05-13

## 2023-06-13 ENCOUNTER — APPOINTMENT (OUTPATIENT)
Dept: CT IMAGING | Facility: HOSPITAL | Age: 33
End: 2023-06-13
Payer: COMMERCIAL

## 2023-06-13 ENCOUNTER — HOSPITAL ENCOUNTER (EMERGENCY)
Facility: HOSPITAL | Age: 33
Discharge: HOME | End: 2023-06-13
Attending: EMERGENCY MEDICINE | Admitting: EMERGENCY MEDICINE
Payer: COMMERCIAL

## 2023-06-13 ENCOUNTER — APPOINTMENT (OUTPATIENT)
Dept: ULTRASOUND IMAGING | Facility: HOSPITAL | Age: 33
End: 2023-06-13
Payer: COMMERCIAL

## 2023-06-13 VITALS
DIASTOLIC BLOOD PRESSURE: 84 MMHG | OXYGEN SATURATION: 99 % | HEART RATE: 73 BPM | TEMPERATURE: 97.7 F | SYSTOLIC BLOOD PRESSURE: 143 MMHG | RESPIRATION RATE: 23 BRPM | BODY MASS INDEX: 53.13 KG/M2 | WEIGHT: 290.5 LBS

## 2023-06-13 DIAGNOSIS — R12 HEARTBURN: ICD-10-CM

## 2023-06-13 LAB
ALBUMIN SERPL BCG-MCNC: 3.3 G/DL (ref 3.5–5.2)
ALP SERPL-CCNC: 80 U/L (ref 35–104)
ALT SERPL W P-5'-P-CCNC: 23 U/L (ref 10–35)
ANION GAP SERPL CALCULATED.3IONS-SCNC: 9 MMOL/L (ref 7–15)
AST SERPL W P-5'-P-CCNC: 27 U/L (ref 10–35)
BASOPHILS # BLD AUTO: 0 10E3/UL (ref 0–0.2)
BASOPHILS NFR BLD AUTO: 1 %
BILIRUB DIRECT SERPL-MCNC: <0.2 MG/DL (ref 0–0.3)
BILIRUB SERPL-MCNC: 0.3 MG/DL
BUN SERPL-MCNC: 11 MG/DL (ref 6–20)
CALCIUM SERPL-MCNC: 8.9 MG/DL (ref 8.6–10)
CHLORIDE SERPL-SCNC: 103 MMOL/L (ref 98–107)
CREAT SERPL-MCNC: 0.51 MG/DL (ref 0.51–0.95)
D DIMER PPP FEU-MCNC: 0.39 UG/ML FEU (ref 0–0.5)
DEPRECATED HCO3 PLAS-SCNC: 25 MMOL/L (ref 22–29)
EOSINOPHIL # BLD AUTO: 0.2 10E3/UL (ref 0–0.7)
EOSINOPHIL NFR BLD AUTO: 4 %
ERYTHROCYTE [DISTWIDTH] IN BLOOD BY AUTOMATED COUNT: 13.2 % (ref 10–15)
GFR SERPL CREATININE-BSD FRML MDRD: >90 ML/MIN/1.73M2
GLUCOSE SERPL-MCNC: 97 MG/DL (ref 70–99)
HCG INTACT+B SERPL-ACNC: <1 MIU/ML
HCT VFR BLD AUTO: 34.8 % (ref 35–47)
HGB BLD-MCNC: 11.5 G/DL (ref 11.7–15.7)
HOLD SPECIMEN: NORMAL
IMM GRANULOCYTES # BLD: 0 10E3/UL
IMM GRANULOCYTES NFR BLD: 0 %
LIPASE SERPL-CCNC: 14 U/L (ref 13–60)
LYMPHOCYTES # BLD AUTO: 3.3 10E3/UL (ref 0.8–5.3)
LYMPHOCYTES NFR BLD AUTO: 49 %
MCH RBC QN AUTO: 29.6 PG (ref 26.5–33)
MCHC RBC AUTO-ENTMCNC: 33 G/DL (ref 31.5–36.5)
MCV RBC AUTO: 90 FL (ref 78–100)
MONOCYTES # BLD AUTO: 0.4 10E3/UL (ref 0–1.3)
MONOCYTES NFR BLD AUTO: 7 %
NEUTROPHILS # BLD AUTO: 2.5 10E3/UL (ref 1.6–8.3)
NEUTROPHILS NFR BLD AUTO: 39 %
NRBC # BLD AUTO: 0 10E3/UL
NRBC BLD AUTO-RTO: 0 /100
PLATELET # BLD AUTO: 374 10E3/UL (ref 150–450)
POTASSIUM SERPL-SCNC: 3.8 MMOL/L (ref 3.4–5.3)
PROT SERPL-MCNC: 7.5 G/DL (ref 6.4–8.3)
RADIOLOGIST FLAGS: ABNORMAL
RBC # BLD AUTO: 3.89 10E6/UL (ref 3.8–5.2)
SODIUM SERPL-SCNC: 137 MMOL/L (ref 136–145)
TROPONIN T SERPL HS-MCNC: <6 NG/L
WBC # BLD AUTO: 6.5 10E3/UL (ref 4–11)

## 2023-06-13 PROCEDURE — 85379 FIBRIN DEGRADATION QUANT: CPT

## 2023-06-13 PROCEDURE — 93005 ELECTROCARDIOGRAM TRACING: CPT | Performed by: EMERGENCY MEDICINE

## 2023-06-13 PROCEDURE — 85025 COMPLETE CBC W/AUTO DIFF WBC: CPT

## 2023-06-13 PROCEDURE — 84484 ASSAY OF TROPONIN QUANT: CPT

## 2023-06-13 PROCEDURE — 82374 ASSAY BLOOD CARBON DIOXIDE: CPT

## 2023-06-13 PROCEDURE — 82248 BILIRUBIN DIRECT: CPT

## 2023-06-13 PROCEDURE — 93971 EXTREMITY STUDY: CPT | Mod: RT

## 2023-06-13 PROCEDURE — 250N000013 HC RX MED GY IP 250 OP 250 PS 637

## 2023-06-13 PROCEDURE — 36415 COLL VENOUS BLD VENIPUNCTURE: CPT

## 2023-06-13 PROCEDURE — 74174 CTA ABD&PLVS W/CONTRAST: CPT

## 2023-06-13 PROCEDURE — 250N000011 HC RX IP 250 OP 636: Performed by: EMERGENCY MEDICINE

## 2023-06-13 PROCEDURE — 84702 CHORIONIC GONADOTROPIN TEST: CPT

## 2023-06-13 PROCEDURE — 99285 EMERGENCY DEPT VISIT HI MDM: CPT

## 2023-06-13 PROCEDURE — 83690 ASSAY OF LIPASE: CPT

## 2023-06-13 RX ORDER — IOPAMIDOL 755 MG/ML
90 INJECTION, SOLUTION INTRAVASCULAR ONCE
Status: COMPLETED | OUTPATIENT
Start: 2023-06-13 | End: 2023-06-13

## 2023-06-13 RX ORDER — MAGNESIUM HYDROXIDE/ALUMINUM HYDROXICE/SIMETHICONE 120; 1200; 1200 MG/30ML; MG/30ML; MG/30ML
15 SUSPENSION ORAL ONCE
Status: COMPLETED | OUTPATIENT
Start: 2023-06-13 | End: 2023-06-13

## 2023-06-13 RX ORDER — FAMOTIDINE 20 MG/1
20 TABLET, FILM COATED ORAL 2 TIMES DAILY
Qty: 30 TABLET | Refills: 0 | Status: SHIPPED | OUTPATIENT
Start: 2023-06-13 | End: 2023-06-29

## 2023-06-13 RX ADMIN — ALUMINUM HYDROXIDE, MAGNESIUM HYDROXIDE, AND SIMETHICONE 15 ML: 200; 200; 20 SUSPENSION ORAL at 11:55

## 2023-06-13 RX ADMIN — IOPAMIDOL 90 ML: 755 INJECTION, SOLUTION INTRAVENOUS at 11:45

## 2023-06-13 ASSESSMENT — ENCOUNTER SYMPTOMS
MYALGIAS: 1
SHORTNESS OF BREATH: 0
HEADACHES: 0
LIGHT-HEADEDNESS: 0
BACK PAIN: 1
CHILLS: 0
DIZZINESS: 0
ABDOMINAL PAIN: 1
FEVER: 0

## 2023-06-13 ASSESSMENT — ACTIVITIES OF DAILY LIVING (ADL)
ADLS_ACUITY_SCORE: 35
ADLS_ACUITY_SCORE: 35
ADLS_ACUITY_SCORE: 33

## 2023-06-13 NOTE — ED TRIAGE NOTES
Patient arrives by private car for evaluation of left to mid chest pain that started last night.  Describes as intermittent sharp stabbing pain.  Patient also here for intermittent right leg pain for a couple weeks and constant since last night.

## 2023-06-13 NOTE — Clinical Note
Luis Antonio Laura was seen and treated in our emergency department on 6/13/2023.  She may return to work on 06/15/2023.       If you have any questions or concerns, please don't hesitate to call.      Tammy Bellamy PA-C

## 2023-06-13 NOTE — DISCHARGE INSTRUCTIONS
Rest.  Drink lots of fluids.  Take Pepcid as prescribed for your heartburn.  Follow-up with your primary care doctor to discuss your new diagnosis of heartburn.  Follow-up with OB/GYN to discuss your IUD removal.  Return to the ED if new symptoms develop or symptoms worsen.

## 2023-06-13 NOTE — ED PROVIDER NOTES
Emergency Department Midlevel Supervisory Note     I personally saw the patient and performed a substantive portion of the visit including all aspects of the medical decision making.    ED Course:  9:06 AM  Tammy Bellamy PA-C staffed patient with me. I agree with their assessment and plan of management, and I will see the patient.  9:20 AM  I met with the patient to introduce myself, gather additional history, perform my initial exam, and discuss the plan.   9:51 AM I rechecked on patient and spoke with them about the results of the work up here in the emergency department.   12:58 PM I rechecked on patient.  12:58 PM I am going to page OB/GYN.     Brief HPI:     Luis Antonio Laura is a 32 year old female who presents for evaluation of chest and leg pain.    For a more detailed HPI, refer to Tammy Bellamy PA-C's note.     I, France Walton, am serving as a scribe to document services personally performed by Cheli Adam MD, based on my observations and the provider's statements to me.   I, Cheli Adam MD, attest that France Walton was acting in a scribe capacity, has observed my performance of the services and has documented them in accordance with my direction.    Brief Physical Exam: BP (!) 143/84   Pulse 73   Temp 97.7  F (36.5  C) (Tympanic)   Resp 23   Wt 131.8 kg (290 lb 8 oz)   LMP  (LMP Unknown)   SpO2 99%   BMI 53.13 kg/m    Constitutional:  Alert, in no acute distress          EKG #1  Sinus rhythm normal anterior progression normal axis biphasic T wave in lead III    Time:090237    Ventricular rate 78 bmp  Axis normal  KS interval 162 ms  QRS duration 92 ms  QT//456 ms    Compared to previous EKG on May 20, 2022 sinus inverted T wave in lead III was seen before no significant changes    MDM:  32-year-old female presents complaint of chest pain that radiates to the back some abdominal pain and right leg pain.  Evaluation for acute coronary syndrome dissection PE DVT negative.  Found a  low-lying IUD removed by physician assistant after discussion with gynecology patient notified that she cannot get pregnant given that the IUD is not in place discharged bilaterally stable condition follow-up primary care doctor and gynecologist.       1. Heartburn        Labs and Imaging:  Results for orders placed or performed during the hospital encounter of 06/13/23   CTA Chest Abdomen Pelvis w Contrast   Result Value Ref Range    Radiologist flags (Urgent)      GYN follow-up needed regarding abnormally positioned IUD    Impression    IMPRESSION:  1.  Interval placement of IUD which is low lying and resides predominantly within the cervix.  2.  No abnormalities are seen to explain symptoms.  3.  Aorta is normal.   4.  Distal colonic diverticulosis.  5.  Prior cholecystectomy.      [Access Center: GYN follow-up needed regarding abnormally positioned IUD]    This report will be copied to the [facility] to ensure a provider acknowledges the finding. Access Center is available Monday through Friday 8am-3:30 pm.        US Lower Extremity Venous Duplex Right    Impression    IMPRESSION:  1.  No deep venous thrombosis in the right lower extremity.   Troponin T, High Sensitivity (now)   Result Value Ref Range    Troponin T, High Sensitivity <6 <=14 ng/L   Basic metabolic panel   Result Value Ref Range    Sodium 137 136 - 145 mmol/L    Potassium 3.8 3.4 - 5.3 mmol/L    Chloride 103 98 - 107 mmol/L    Carbon Dioxide (CO2) 25 22 - 29 mmol/L    Anion Gap 9 7 - 15 mmol/L    Urea Nitrogen 11.0 6.0 - 20.0 mg/dL    Creatinine 0.51 0.51 - 0.95 mg/dL    Calcium 8.9 8.6 - 10.0 mg/dL    Glucose 97 70 - 99 mg/dL    GFR Estimate >90 >60 mL/min/1.73m2   D dimer quantitative   Result Value Ref Range    D-Dimer Quantitative 0.39 0.00 - 0.50 ug/mL FEU   Result Value Ref Range    Lipase 14 13 - 60 U/L   Hepatic function panel   Result Value Ref Range    Protein Total 7.5 6.4 - 8.3 g/dL    Albumin 3.3 (L) 3.5 - 5.2 g/dL     Bilirubin Total 0.3 <=1.2 mg/dL    Alkaline Phosphatase 80 35 - 104 U/L    AST 27 10 - 35 U/L    ALT 23 10 - 35 U/L    Bilirubin Direct <0.20 0.00 - 0.30 mg/dL   CBC with platelets and differential   Result Value Ref Range    WBC Count 6.5 4.0 - 11.0 10e3/uL    RBC Count 3.89 3.80 - 5.20 10e6/uL    Hemoglobin 11.5 (L) 11.7 - 15.7 g/dL    Hematocrit 34.8 (L) 35.0 - 47.0 %    MCV 90 78 - 100 fL    MCH 29.6 26.5 - 33.0 pg    MCHC 33.0 31.5 - 36.5 g/dL    RDW 13.2 10.0 - 15.0 %    Platelet Count 374 150 - 450 10e3/uL    % Neutrophils 39 %    % Lymphocytes 49 %    % Monocytes 7 %    % Eosinophils 4 %    % Basophils 1 %    % Immature Granulocytes 0 %    NRBCs per 100 WBC 0 <1 /100    Absolute Neutrophils 2.5 1.6 - 8.3 10e3/uL    Absolute Lymphocytes 3.3 0.8 - 5.3 10e3/uL    Absolute Monocytes 0.4 0.0 - 1.3 10e3/uL    Absolute Eosinophils 0.2 0.0 - 0.7 10e3/uL    Absolute Basophils 0.0 0.0 - 0.2 10e3/uL    Absolute Immature Granulocytes 0.0 <=0.4 10e3/uL    Absolute NRBCs 0.0 10e3/uL   Extra Red Top Tube   Result Value Ref Range    Hold Specimen JIC    HCG quantitative pregnancy   Result Value Ref Range    hCG Quantitative <1 <5 mIU/mL     I have reviewed the relevant laboratory and radiology studies    Procedures:  I was present for the key portions of this procedure: none    Cheli Adam MD  [facility] EMERGENCY DEPARTMENT  [address] [address] [phone]       Cheli Adam MD  06/14/23 2551

## 2023-06-13 NOTE — ED PROVIDER NOTES
EMERGENCY DEPARTMENT ENCOUNTER      NAME: Luis Antonio Laura  AGE: 32 year old female  YOB: 1990  MRN: [id number]  EVALUATION DATE & TIME: 6/13/2023  8:56 AM    PCP: Medina Melton    ED PROVIDER: Tammy Bellamy PA-C      Chief Complaint   Patient presents with     Chest Pain     Leg Pain     FINAL IMPRESSION:  1. Heartburn      ED COURSE & MEDICAL DECISION MAKING:    Pertinent Labs & Imaging studies reviewed. (See chart for details)  32 year old female presents to the Emergency Department for evaluation of centralized chest pain that radiates to her epigastric region and her shoulder blades.  Patient reports that her chest pain has been intermittent since it started yesterday evening.  She also complains of leg pain that radiates down the anterior aspect of her right thigh. Her leg pain has been intermittent since last month. Her leg pain normally comes during her menstrual cycle. Vital signs reviewed and patient is hypertensive most likely secondary due to pain.  Afebrile.  On exam chest wall is nontender to palpation.  No rash.  Abdomen is soft and nontender.  Bilateral lower extremities are nontender to palpation.  Normal range of motion, sensation and strength of bilateral lower extremities.  No overlying erythema, edema, ecchymosis.  Lacerations or abrasions.  Normal warmth.  Pulses are 2+ bilaterally.     Differential diagnosis includes ACS, myocarditis, pericarditis, dissection, GERD, gastritis, pancreatitis, hepatobiliary etiology, DVT.  Initial troponin was less than 6.  According to the algorithm and because the patient's chest pain started more than 6 hours ago delta troponin is not indicated at this point time.  EKG showed no ischemic changes.  WBC shows no white blood cell elevation.  Basic unremarkable.  D-dimer is not elevated.  PE unlikely.  Lipase and hepatic was on elevated. US of the lower extremity shows no DVT in the right lower extremity.  CT of the chest abdomen pelvis shows  an inverted placement of an IUD which is low-lying and resides predominantly within the cervix.  No abnormalities are seen to explain the symptoms.  Aorta is normal.  Distal colonic diverticulosis prior cholecystectomy.  OB/GYN was consulted about the MRI results who recommended removal of the IUD.  I was able to successfully remove the IUD.  All parts of the IUD were removed.  Patient tolerated the procedure well.  Patient will follow-up with OB/GYN to discuss her IUD removal.  Patient will follow-up with her primary care doctor to discuss her ED visit.  Patient prescribed Pepcid for her GERD.  Patient agrees with plan.  All questions answered.      ED COURSE:    9:06 AM Staffed patient with Dr. Adam   9:07 AM I introduced myself to the patient, obtained patient history, performed a physical exam, and discussed plan for ED workup including potential diagnostic laboratory/imaging studies and interventions.  1:02 PM I spoke with radiology regarding patient's imaging results.   1:06 PM I spoke with OBGYN Dr. June regarding patient. They recommend taking the IUD out.   1:10 PM I rechecked the patient and updated them on laboratory and imaging results along with plan of care.   1:35 PM I removed patient's IUD. We discussed the plan for discharge and the patient is agreeable. Reviewed supportive cares, symptomatic treatment, outpatient follow up, and reasons to return to the Emergency Department. Patient to be discharged by ED RN.          At the conclusion of the encounter I discussed the results of all of the tests and the disposition. The questions were answered. The patient or family acknowledged understanding and was agreeable with the care plan.     0 minutes of critical care time       Additional Documentation    History:    Supplemental history from: Documented in chart, if applicable    External Record(s) reviewed: Documented in chart, if applicable.    Work Up:    Chart documentation includes  differential considered and any EKGs or imaging interpreted by provider.    In additional to work up documented, I considered the following work up: Documented in chart, if applicable.    External consultation:    Discussion of management with another provider: Documented in chart, if applicable    Complicating factors:    Care impacted by chronic illness: N/A    Care affected by social determinants of health: Access to Medical Care    Disposition considerations: Discharge. I prescribed additional prescription strength medication(s) as charted. See documentation for any additional details.      MEDICATIONS GIVEN IN THE EMERGENCY:  Medications   alum & mag hydroxide-simethicone (MAALOX) suspension 15 mL (15 mLs Oral $Given 6/13/23 1159)   iopamidol (ISOVUE-370) solution 90 mL (90 mLs Intravenous $Given 6/13/23 1147)       NEW PRESCRIPTIONS STARTED AT TODAY'S ER VISIT  Discharge Medication List as of 6/13/2023  1:52 PM      START taking these medications    Details   famotidine (PEPCID) 20 MG tablet Take 1 tablet (20 mg) by mouth 2 times daily, Disp-30 tablet, R-0, Local Print             =================================================================    HPI    Patient information was obtained from: Patient     Use of : N/A        Luis Antonio Laura is a 32 year old female with a pertinent history of morbid obesity and cholelithiases who presents to this ED via walk in for evaluation of chest pain and leg pain.     Patient reports intermittent mid chest pain that began last night. The pain radiates to her epigastric region and between her shoulder blades, but does not radiate to her shoulder, arm or jaw. The pain is stabbing/sharp in nature and is worse with palpation. The chest pain last from seconds to minutes and lingers from sharp to dullness then goes away. She thought it was heart burn at first and tried TUMs without any relief. States that the pain woke her up from her sleep along with ongoing right  anterior leg pain. Her leg pain has been going on for a couple of weeks. States that she usually gets leg pain when she's on her period, but it's been ongoing since her period ended last week. Denies any fever, chills, headache, dizziness, lightheadedness, shortness of breath. No bowel or urinary changes. No recent long travels. She takes venlafaxine and olanzapine daily. Not on any blood thinners. She has an IUD.     REVIEW OF SYSTEMS   Review of Systems   Constitutional: Negative for chills and fever.   Respiratory: Negative for shortness of breath.    Cardiovascular: Positive for chest pain.   Gastrointestinal: Positive for abdominal pain (Epigastric).   Musculoskeletal: Positive for back pain and myalgias (Right leg ).   Neurological: Negative for dizziness, light-headedness and headaches.        PAST MEDICAL HISTORY:  Past Medical History:   Diagnosis Date     Cholelithiases      Depressive disorder      Obesity        PAST SURGICAL HISTORY:  Past Surgical History:   Procedure Laterality Date      SECTION N/A 2022    Procedure:  SECTION;  Surgeon: Get Bassett MD;  Location: [facility]     CHOLECYSTECTOMY       INSERT INTRAUTERINE DEVICE       WISDOM TOOTH EXTRACTION         CURRENT MEDICATIONS:    famotidine (PEPCID) 20 MG tablet  doxycycline hyclate (VIBRAMYCIN) 100 MG capsule  naproxen (NAPROSYN) 500 MG tablet  OLANZapine (ZYPREXA) 10 MG tablet  venlafaxine (EFFEXOR) 75 MG tablet  VENTOLIN  (90 Base) MCG/ACT inhaler        ALLERGIES:  Allergies   Allergen Reactions     Penicillins Hives and Rash     itching       Sulfa Antibiotics Anaphylaxis       FAMILY HISTORY:  Family History   Problem Relation Age of Onset     Multiple Sclerosis Mother      Hypertension Maternal Grandmother      Diabetes Maternal Grandmother      Heart Disease Maternal Grandmother      Hypertension Paternal Grandmother        SOCIAL HISTORY:   Social History     Socioeconomic History      Marital status:    Tobacco Use     Smoking status: Former     Types: Vaping Device     Quit date: 9/10/2020     Years since quittin.7     Smokeless tobacco: Never   Substance and Sexual Activity     Alcohol use: Yes     Alcohol/week: 14.0 standard drinks of alcohol     Comment: prior to pregnacy maybe 2-3 times a week     Drug use: Not Currently     Types: Marijuana     Comment: stopped a few months prior to get pregnancy     Sexual activity: Yes     Partners: Male     Comment: single       VITALS:  BP (!) 143/84   Pulse 73   Temp 97.7  F (36.5  C) (Tympanic)   Resp 23   Wt 131.8 kg (290 lb 8 oz)   LMP  (LMP Unknown)   SpO2 99%   BMI 53.13 kg/m      PHYSICAL EXAM    Physical Exam  Vitals and nursing note reviewed.   Constitutional:       General: She is not in acute distress.     Appearance: Normal appearance. She is not ill-appearing, toxic-appearing or diaphoretic.   HENT:      Head: Atraumatic.      Right Ear: External ear normal.      Left Ear: External ear normal.      Nose: Nose normal.      Mouth/Throat:      Mouth: Mucous membranes are moist.   Eyes:      Conjunctiva/sclera: Conjunctivae normal.      Pupils: Pupils are equal, round, and reactive to light.   Cardiovascular:      Rate and Rhythm: Normal rate and regular rhythm.      Pulses: Normal pulses.      Heart sounds: Normal heart sounds. No murmur heard.     No friction rub. No gallop.   Pulmonary:      Effort: Pulmonary effort is normal. No respiratory distress.      Breath sounds: Normal breath sounds. No stridor. No wheezing, rhonchi or rales.   Chest:      Chest wall: No tenderness.   Abdominal:      Tenderness: There is no abdominal tenderness. There is no guarding or rebound.   Musculoskeletal:      Cervical back: Normal range of motion.      Right lower leg: No edema.      Left lower leg: No edema.      Comments: Bilateral lower extremities are nontender to palpation.  Normal range of motion, sensation and strength of bilateral  lower extremities.  No erythema, edema, ecchymosis.  No warmth.  No lacerations or abrasions.  Pulses are 2+ bilaterally.   Skin:     General: Skin is dry.      Findings: No rash.   Neurological:      General: No focal deficit present.      Mental Status: She is alert and oriented to person, place, and time. Mental status is at baseline.      Cranial Nerves: No cranial nerve deficit.   Psychiatric:         Mood and Affect: Mood normal.         Thought Content: Thought content normal.          LAB:  All pertinent labs reviewed and interpreted.  Labs Ordered and Resulted from Time of ED Arrival to Time of ED Departure   HEPATIC FUNCTION PANEL - Abnormal       Result Value    Protein Total 7.5      Albumin 3.3 (*)     Bilirubin Total 0.3      Alkaline Phosphatase 80      AST 27      ALT 23      Bilirubin Direct <0.20     CBC WITH PLATELETS AND DIFFERENTIAL - Abnormal    WBC Count 6.5      RBC Count 3.89      Hemoglobin 11.5 (*)     Hematocrit 34.8 (*)     MCV 90      MCH 29.6      MCHC 33.0      RDW 13.2      Platelet Count 374      % Neutrophils 39      % Lymphocytes 49      % Monocytes 7      % Eosinophils 4      % Basophils 1      % Immature Granulocytes 0      NRBCs per 100 WBC 0      Absolute Neutrophils 2.5      Absolute Lymphocytes 3.3      Absolute Monocytes 0.4      Absolute Eosinophils 0.2      Absolute Basophils 0.0      Absolute Immature Granulocytes 0.0      Absolute NRBCs 0.0     TROPONIN T, HIGH SENSITIVITY - Normal    Troponin T, High Sensitivity <6     BASIC METABOLIC PANEL - Normal    Sodium 137      Potassium 3.8      Chloride 103      Carbon Dioxide (CO2) 25      Anion Gap 9      Urea Nitrogen 11.0      Creatinine 0.51      Calcium 8.9      Glucose 97      GFR Estimate >90     D DIMER QUANTITATIVE - Normal    D-Dimer Quantitative 0.39     LIPASE - Normal    Lipase 14     HCG QUANTITATIVE PREGNANCY - Normal    hCG Quantitative <1          RADIOLOGY:  Reviewed all pertinent imaging. Please see  official radiology report.  CTA Chest Abdomen Pelvis w Contrast   Final Result   Abnormal   IMPRESSION:   1.  Interval placement of IUD which is low lying and resides predominantly within the cervix.   2.  No abnormalities are seen to explain symptoms.   3.  Aorta is normal.    4.  Distal colonic diverticulosis.   5.  Prior cholecystectomy.         [Access Center: GYN follow-up needed regarding abnormally positioned IUD]      This report will be copied to the [facility] to ensure a provider acknowledges the finding. Access Center is available Monday through Friday 8am-3:30 pm.             US Lower Extremity Venous Duplex Right   Final Result   IMPRESSION:   1.  No deep venous thrombosis in the right lower extremity.          EKG:    Performed at: 6/13/2023 09:02     Impression: Sinus rhythm, normal ECG, when compared with ECG of 5/20/2022 18:11, no significant change was found.     Rate: 78 bpm   Rhythm: Sinus   Axis: 85   OR Interval: 162 ms   QRS Interval: 92 ms   QTc Interval: 456 ms   ST Changes: None   Comparison: 5/20/2022 18:11     Dr. Adam have reviewed and interpreted the EKG(s) documented above along with Dr. Adam, ED MD.      I, Stevie Catherine, am serving as a scribe to document services personally performed by Tammy Bellamy PA-C, based on my observation and the provider's statements to me. I, Tammy Bellamy PA-C, attest that Stevie Catherine is acting in a scribe capacity, has observed my performance of the services and has documented them in accordance with my direction.    Tammy Bellamy PA-C  [facility] EMERGENCY DEPARTMENT  [address] [place] [id number]  [phone]     Tammy Bellamy PA-C  06/13/23 2118

## 2023-06-14 ENCOUNTER — PATIENT OUTREACH (OUTPATIENT)
Dept: CARE COORDINATION | Facility: CLINIC | Age: 33
End: 2023-06-14
Payer: COMMERCIAL

## 2023-06-14 NOTE — LETTER
[facility] CARE COORDINATION  [address] [place] [address]    June 14, 2023    Luis Antonio Laura  [address]   [place] [address]      Dear Luis Antonio,        I am a  clinic community health worker who works with BRYN De Anda CNP with the [facility]. I wanted to thank you for spending the time to talk with me.  Below is a description of clinic care coordination and how I can further assist you.       The clinic care coordination team is made up of a registered nurse, , financial resource worker and community health worker who understand the health care system. The goal of clinic care coordination is to help you manage your health and improve access to the health care system. Our team works alongside your provider to assist you in determining your health and social needs. We can help you obtain health care and community resources, providing you with necessary information and education. We can work with you through any barriers and develop a care plan that helps coordinate and strengthen the communication between you and your care team.  Our services are voluntary and are offered without charge to you personally.    If you wish to connect with the Clinic Care Coordination Team, please let your [facility] Primary Care Provider or Clinic Care Team know and they can place a referral. The Clinic Care Coordination team will then reach out by phone to further support you.    We are focused on providing you with the highest-quality healthcare experience possible.    Sincerely,   Your care team with [facility]

## 2023-06-14 NOTE — PROGRESS NOTES
Clinic Care Coordination Contact  Community Health Worker Initial Outreach    CHW Initial Information Gathering:  Referral Source: ED Follow-Up  Preferred Hospital: [facility]  [phone]  Preferred Urgent Care: [facility], [phone]  No PCP office visit in Past Year: No       Patient accepts CC: No, due to the patient stating that at this time there are no concerns that CCC would be able to assit with. Patient will be sent Care Coordination introduction letter for future reference.     THAI Nettles  [phone]  [facility] Resource [facility]

## 2023-06-19 LAB
ATRIAL RATE - MUSE: 78 BPM
DIASTOLIC BLOOD PRESSURE - MUSE: NORMAL MMHG
INTERPRETATION ECG - MUSE: NORMAL
P AXIS - MUSE: 64 DEGREES
PR INTERVAL - MUSE: 162 MS
QRS DURATION - MUSE: 92 MS
QT - MUSE: 400 MS
QTC - MUSE: 456 MS
R AXIS - MUSE: 85 DEGREES
SYSTOLIC BLOOD PRESSURE - MUSE: NORMAL MMHG
T AXIS - MUSE: 19 DEGREES
VENTRICULAR RATE- MUSE: 78 BPM

## 2023-06-29 ENCOUNTER — OFFICE VISIT (OUTPATIENT)
Dept: FAMILY MEDICINE | Facility: CLINIC | Age: 33
End: 2023-06-29
Payer: COMMERCIAL

## 2023-06-29 VITALS
BODY MASS INDEX: 49.34 KG/M2 | SYSTOLIC BLOOD PRESSURE: 117 MMHG | HEIGHT: 64 IN | TEMPERATURE: 98.1 F | OXYGEN SATURATION: 99 % | RESPIRATION RATE: 16 BRPM | HEART RATE: 97 BPM | WEIGHT: 289 LBS | DIASTOLIC BLOOD PRESSURE: 80 MMHG

## 2023-06-29 DIAGNOSIS — R07.89 CHEST TIGHTNESS: ICD-10-CM

## 2023-06-29 DIAGNOSIS — F33.2 SEVERE EPISODE OF RECURRENT MAJOR DEPRESSIVE DISORDER, WITHOUT PSYCHOTIC FEATURES (H): ICD-10-CM

## 2023-06-29 DIAGNOSIS — R73.03 PRE-DIABETES: ICD-10-CM

## 2023-06-29 DIAGNOSIS — Z00.00 ANNUAL PHYSICAL EXAM: Primary | ICD-10-CM

## 2023-06-29 DIAGNOSIS — F41.1 GENERALIZED ANXIETY DISORDER: ICD-10-CM

## 2023-06-29 DIAGNOSIS — F17.210 CIGARETTE NICOTINE DEPENDENCE WITHOUT COMPLICATION: ICD-10-CM

## 2023-06-29 DIAGNOSIS — N94.6 DYSMENORRHEA: ICD-10-CM

## 2023-06-29 DIAGNOSIS — Z63.4 BEREAVEMENT: ICD-10-CM

## 2023-06-29 DIAGNOSIS — L73.2 HIDRADENITIS SUPPURATIVA: ICD-10-CM

## 2023-06-29 DIAGNOSIS — E66.01 MORBID OBESITY (H): ICD-10-CM

## 2023-06-29 LAB
CHOLEST SERPL-MCNC: 162 MG/DL
HBA1C MFR BLD: 5.8 % (ref 0–5.6)
HDLC SERPL-MCNC: 54 MG/DL
LDLC SERPL CALC-MCNC: 95 MG/DL
NONHDLC SERPL-MCNC: 108 MG/DL
TRIGL SERPL-MCNC: 65 MG/DL

## 2023-06-29 PROCEDURE — 83036 HEMOGLOBIN GLYCOSYLATED A1C: CPT | Performed by: PHYSICIAN ASSISTANT

## 2023-06-29 PROCEDURE — 99214 OFFICE O/P EST MOD 30 MIN: CPT | Mod: 25 | Performed by: PHYSICIAN ASSISTANT

## 2023-06-29 PROCEDURE — 99395 PREV VISIT EST AGE 18-39: CPT | Performed by: PHYSICIAN ASSISTANT

## 2023-06-29 PROCEDURE — 80061 LIPID PANEL: CPT | Performed by: PHYSICIAN ASSISTANT

## 2023-06-29 PROCEDURE — 36415 COLL VENOUS BLD VENIPUNCTURE: CPT | Performed by: PHYSICIAN ASSISTANT

## 2023-06-29 RX ORDER — CETIRIZINE HYDROCHLORIDE 10 MG/1
TABLET ORAL
COMMUNITY
Start: 2022-12-26 | End: 2024-09-10

## 2023-06-29 RX ORDER — FERROUS SULFATE 325(65) MG
TABLET ORAL
COMMUNITY
Start: 2022-05-02 | End: 2023-06-29

## 2023-06-29 RX ORDER — IBUPROFEN 600 MG/1
TABLET, FILM COATED ORAL
COMMUNITY
Start: 2022-12-26 | End: 2024-09-11

## 2023-06-29 RX ORDER — ADALIMUMAB 40MG/0.4ML
KIT SUBCUTANEOUS
COMMUNITY
Start: 2023-06-22

## 2023-06-29 SDOH — SOCIAL STABILITY - SOCIAL INSECURITY: DISSAPEARANCE AND DEATH OF FAMILY MEMBER: Z63.4

## 2023-06-29 ASSESSMENT — ENCOUNTER SYMPTOMS
SHORTNESS OF BREATH: 1
NERVOUS/ANXIOUS: 1
NAUSEA: 1
BREAST MASS: 0
WEAKNESS: 1

## 2023-06-29 ASSESSMENT — ANXIETY QUESTIONNAIRES
5. BEING SO RESTLESS THAT IT IS HARD TO SIT STILL: NEARLY EVERY DAY
3. WORRYING TOO MUCH ABOUT DIFFERENT THINGS: NEARLY EVERY DAY
IF YOU CHECKED OFF ANY PROBLEMS ON THIS QUESTIONNAIRE, HOW DIFFICULT HAVE THESE PROBLEMS MADE IT FOR YOU TO DO YOUR WORK, TAKE CARE OF THINGS AT HOME, OR GET ALONG WITH OTHER PEOPLE: VERY DIFFICULT
GAD7 TOTAL SCORE: 21
7. FEELING AFRAID AS IF SOMETHING AWFUL MIGHT HAPPEN: NEARLY EVERY DAY
6. BECOMING EASILY ANNOYED OR IRRITABLE: NEARLY EVERY DAY
GAD7 TOTAL SCORE: 21
2. NOT BEING ABLE TO STOP OR CONTROL WORRYING: NEARLY EVERY DAY
1. FEELING NERVOUS, ANXIOUS, OR ON EDGE: NEARLY EVERY DAY

## 2023-06-29 ASSESSMENT — PATIENT HEALTH QUESTIONNAIRE - PHQ9
SUM OF ALL RESPONSES TO PHQ QUESTIONS 1-9: 15
10. IF YOU CHECKED OFF ANY PROBLEMS, HOW DIFFICULT HAVE THESE PROBLEMS MADE IT FOR YOU TO DO YOUR WORK, TAKE CARE OF THINGS AT HOME, OR GET ALONG WITH OTHER PEOPLE: EXTREMELY DIFFICULT
SUM OF ALL RESPONSES TO PHQ QUESTIONS 1-9: 15
5. POOR APPETITE OR OVEREATING: NEARLY EVERY DAY

## 2023-06-29 NOTE — PROGRESS NOTES
SUBJECTIVE    Luis Antonio Laura is a 32 year old female who presents for an annual exam.    Other concerns today:  1.  Weight  Her  passed away suddenly about a month ago.  This is of course upsetting and stressful for her.  She feels like she has been stress eating and gained some weight.  She is working on new plans for healthier eating and increasing exercise.  Drinks alcohol rarely, maybe 2 times a week.  Does use marijuana daily.  Has cut down to 2 cigarettes a day.    2.  Chest tightness.  She had this evaluated at the ER .  Work-up there including EKG and chest CT were all grossly normal.  She was diagnosed with probable heartburn.  She feels like the chest tightness only happens if she is very emotional or sad.  She has never really had anything like this before.  She is restarting therapy.  She has a psychiatrist and will contact them to set up an appointment.    Additionally, history of tubal ligation.  She had an IUD but had to have her removed because it was causing pain.  She was using contraception primarily to control her very painful menstrual periods.  Not using anything right now, other than Tylenol and ibuprofen, which help a little bit.  She is following with OB/GYN regularly.  She will come back to schedule follow-up appointment about this.    I reviewed ER note from 2023.  I reviewed EKG report from 2013.  I reviewed lower extremity venous ultrasound report and a CTA chest abdomen pelvis report from 2023.      Patient Active Problem List    Diagnosis Date Noted     Status post primary low transverse  section 2022     Priority: Medium     Severe episode of recurrent major depressive disorder (H) 2019     Priority: Medium     Persistent insomnia 2019     Priority: Medium     Generalized anxiety disorder 2019     Priority: Medium     History of cholestasis during pregnancy 10/12/2018     Priority: Medium     Need for hepatitis B  vaccination 2018     Priority: Medium     Screening for malignant neoplasm of cervix 2014     Priority: Medium     Overview:    NILM  2017 NILM  27 y.o.   Plan: Co-test 2020         Hidradenitis suppurativa 2013     Priority: Medium     Morbid obesity (H) 2013     Priority: Medium        Immunization History   Administered Date(s) Administered     COVID-19 Monovalent 12+ (Pfizer ) 2022, 2022     DTAP (<7y) 1989, 1990, 1991     FLU 6-35 months 2009     M8l6-58 Novel Flu 2009     HPV Quadrivalent 2007     Hepatitis B (Peds <19Y) 1997, 1998, 1999     Hepatitis B, Adult 2018, 2018, 10/18/2018     Historical DTP/aP 1989, 1990, 1991     Influenza (H1N1) 2009     Influenza (IIV3) PF 2009     MMR 01/10/1991, 1997     Meningococcal ACWY (Menactra ) 2007     OPV, trivalent, live 1989, 1989, 1990, 1991     Poliovirus, inactivated (IPV) 1989, 1989, 1990, 1991     TDAP (Adacel,Boostrix) 1998, 2002, 2010, 2018, 2022     Td (Adult), Adsorbed 1998, 2002     Varicella 2002, 2003       No LMP recorded (lmp unknown).  OB History    Para Term  AB Living   3 3 1 2 0 3   SAB IAB Ectopic Multiple Live Births   0 0 0 0 3      # Outcome Date GA Lbr Burak/2nd Weight Sex Delivery Anes PTL Lv   3 Term 22 37w5d  2.76 kg (6 lb 1.4 oz) F CS-LTranv EPI N PORSHA      Name: ADAMFEMALE-EDELMIRA      Apgar1: 8  Apgar5: 9   2  10/16/18 36w6d 02:07 / 00:18 2.41 kg (5 lb 5 oz) M Vag-Spont  N PORSHA      Name: Zeb      Apgar1: 9  Apgar5: 9   1  05/10 36w2d  2.353 kg (5 lb 3 oz) M Vag-Spont   PORSHA      Name: Sadiq       Current Outpatient Medications   Medication     cetirizine (ZYRTEC) 10 MG tablet     HUMIRA *CF* PEN 40 MG/0.4ML pen kit     ibuprofen (ADVIL/MOTRIN) 600  MG tablet     naproxen (NAPROSYN) 500 MG tablet     OLANZapine (ZYPREXA) 10 MG tablet     venlafaxine (EFFEXOR) 75 MG tablet     VENTOLIN  (90 Base) MCG/ACT inhaler     No current facility-administered medications for this visit.       Past Medical History:   Diagnosis Date     Cholelithiases      Depressive disorder      Obesity        Past Surgical History:   Procedure Laterality Date      SECTION N/A 2022    Procedure:  SECTION;  Surgeon: Get Bassett MD;  Location: [facility]     CHOLECYSTECTOMY       INSERT INTRAUTERINE DEVICE       WISDOM TOOTH EXTRACTION          Family History   Problem Relation Age of Onset     Multiple Sclerosis Mother      Hypertension Maternal Grandmother      Diabetes Maternal Grandmother      Heart Disease Maternal Grandmother      Hypertension Paternal Grandmother             2023     8:18 AM   Additional Questions   Roomed by Soniya SALDANA   Accompanied by self     Healthy Habits:     Getting at least 3 servings of Calcium per day:  NO    Bi-annual eye exam:  Yes    Dental care twice a year:  NO    Sleep apnea or symptoms of sleep apnea:  Daytime drowsiness    Diet:  Regular (no restrictions)    Frequency of exercise:  2-3 days/week    Duration of exercise:  Less than 15 minutes    Taking medications regularly:  No    Barriers to taking medications:  Problems remembering to take them    Medication side effects:  None    PHQ-2 Total Score: 5    Additional concerns today:  Yes    Social History     Tobacco Use     Smoking status: Former     Types: Vaping Device     Quit date: 9/10/2020     Years since quittin.8     Smokeless tobacco: Never   Substance Use Topics     Alcohol use: Yes     Alcohol/week: 14.0 standard drinks of alcohol     Comment: prior to pregnacy maybe 2-3 times a week           2023     8:06 AM   Alcohol Use   Prescreen: >3 drinks/day or >7 drinks/week? No         2023     8:06 AM   Breast CA Risk Assessment  "(FHS-7)   Do you have a family history of breast, colon, or ovarian cancer? No / Unknown         Latest Ref Rng & Units 8/17/2022     4:20 PM   PAP / HPV   PAP  Negative for Intraepithelial Lesion or Malignancy (NILM)    HPV 16 DNA Negative Negative    HPV 18 DNA Negative Negative    Other HR HPV Negative Negative      Review of Systems   Respiratory: Positive for shortness of breath.    Cardiovascular: Positive for chest pain.   Gastrointestinal: Positive for nausea.   Breasts:  Negative for tenderness, breast mass and discharge.   Genitourinary: Positive for pelvic pain and urgency. Negative for vaginal bleeding and vaginal discharge.   Neurological: Positive for weakness.   Psychiatric/Behavioral: Positive for mood changes. The patient is nervous/anxious.          OBJECTIVE    Vitals:    06/29/23 0821   BP: 117/80   BP Location: Left arm   Patient Position: Sitting   Cuff Size: Thigh   Pulse: 97   Resp: 16   Temp: 98.1  F (36.7  C)   TempSrc: Temporal   SpO2: 99%   Weight: 131.1 kg (289 lb)   Height: 1.635 m (5' 4.37\")       Body mass index is 49.04 kg/m .    Physical Exam:  General: patient is alert and oriented x 3, in no apparent distress  HEENT, Thyroid, Lymphatic, Cardiac, Pulmonary, GI, Musculoskeletal, Skin, and Neuro exams were completed today and grossly normal  Breast exam: Deferred  Genitourinary: Deferred, up-to-date      Recent Results (from the past 24 hour(s))   Hemoglobin A1c    Collection Time: 06/29/23  9:02 AM   Result Value Ref Range    Hemoglobin A1C 5.8 (H) 0.0 - 5.6 %     Other labs pending.        ASSESSMENT and PLAN    1. Annual physical exam  Health maintenance is discussed with patient as appropriate for age and risk factors.  Up-to-date on her Pap test.  Screening labs ordered and I will follow-up with results.  Up-to-date on other healthcare maintenance.  - Hemoglobin A1c  - Lipid Profile    2. Morbid obesity (H)  Chronic, stable.  Patient notes she has gained some more weight " recently due to stress from her  passing away.  She is working on slowly reintroducing exercise and eating healthier.  She declines referral to bariatric care at this time, but may consider it in the future.  Continue with present plan.  - Hemoglobin A1c  - Lipid Profile    3. Chest tightness  Had work-up in the ER including EKG and chest CT.  All of which was grossly normal.  Based on her symptoms, I suspect this is due to anxiety.  We discussed that today.  We discussed continuing to work on general health, and then working on decreasing anxiety, should hopefully help resolve this.  See below for mental health plan.    4. Severe episode of recurrent major depressive disorder, without psychotic features (H)  5. Generalized anxiety disorder  Chronic, worsening.  Certainly worsening recently due to her  just passing away.  She is restarting seeing her therapist again.  She also needs to check in with her psychiatrist.  They are managing her mental health, and her mental health medications.  She uses marijuana daily.  Patient denies any thoughts of wanting to hurt him/herself or others and does contract for safety.    6. Bereavement  New problem.  Her  passed away suddenly 1 month ago.  This is of course causing more stress and anxiety, worsening her mood.  She has just restarted therapy.  She needs to contact her psychiatrist again as well.  Continue present medications.  See plan for #4/5.    7. Hidradenitis suppurativa  Chronic, stable.  Well-controlled with Humira.    8. Pre-diabetes  New diagnosis.  Patient informed of all results and discussed healthy eating, weight loss, and increased exercise.  Recheck this in 6 to 12 months.    9. Cigarette nicotine dependence without complication  Discussed with patient today.  Has cut down to 2 cigarettes/day.  Understands she needs to quit smoking, but now may not be the right time  Address at a future visit.        This dictation uses voice  recognition software, which may contain typographical errors.

## 2023-07-03 PROBLEM — R73.03 PRE-DIABETES: Status: ACTIVE | Noted: 2023-07-03

## 2023-11-17 ENCOUNTER — TRANSFERRED RECORDS (OUTPATIENT)
Dept: HEALTH INFORMATION MANAGEMENT | Facility: CLINIC | Age: 33
End: 2023-11-17
Payer: COMMERCIAL

## 2023-12-08 ENCOUNTER — HOSPITAL ENCOUNTER (EMERGENCY)
Facility: CLINIC | Age: 33
Discharge: HOME | End: 2023-12-08
Attending: STUDENT IN AN ORGANIZED HEALTH CARE EDUCATION/TRAINING PROGRAM | Admitting: STUDENT IN AN ORGANIZED HEALTH CARE EDUCATION/TRAINING PROGRAM

## 2023-12-08 VITALS
HEIGHT: 62 IN | TEMPERATURE: 97.5 F | OXYGEN SATURATION: 99 % | BODY MASS INDEX: 47.84 KG/M2 | DIASTOLIC BLOOD PRESSURE: 85 MMHG | WEIGHT: 260 LBS | RESPIRATION RATE: 18 BRPM | HEART RATE: 86 BPM | SYSTOLIC BLOOD PRESSURE: 136 MMHG

## 2023-12-08 DIAGNOSIS — G56.03 CARPAL TUNNEL SYNDROME ON BOTH SIDES: ICD-10-CM

## 2023-12-08 DIAGNOSIS — R20.2 TINGLING: ICD-10-CM
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
## 2023-12-08 PROCEDURE — 99283 EMERGENCY DEPT VISIT LOW MDM: CPT

## 2023-12-08 ASSESSMENT — ACTIVITIES OF DAILY LIVING (ADL): ADLS_ACUITY_SCORE: 35

## 2023-12-08 NOTE — Clinical Note
Luis Antonio Laura was seen and treated in our emergency department on 12/8/2023.  She may return to work on 12/09/2023.       If you have any questions or concerns, please don't hesitate to call.      Lino Sanchez MD
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
## 2023-12-22 ENCOUNTER — OFFICE VISIT (OUTPATIENT)
Dept: FAMILY MEDICINE | Facility: CLINIC | Age: 33
End: 2023-12-22
Payer: COMMERCIAL

## 2023-12-22 VITALS
HEIGHT: 62 IN | TEMPERATURE: 98.8 F | BODY MASS INDEX: 52.81 KG/M2 | SYSTOLIC BLOOD PRESSURE: 136 MMHG | DIASTOLIC BLOOD PRESSURE: 83 MMHG | HEART RATE: 91 BPM | WEIGHT: 287 LBS | RESPIRATION RATE: 14 BRPM | OXYGEN SATURATION: 99 %

## 2023-12-22 DIAGNOSIS — R29.898 DECREASED GRIP STRENGTH: ICD-10-CM

## 2023-12-22 DIAGNOSIS — R73.03 PRE-DIABETES: ICD-10-CM

## 2023-12-22 DIAGNOSIS — E66.01 MORBID OBESITY (H): ICD-10-CM

## 2023-12-22 DIAGNOSIS — R20.2 NUMBNESS AND TINGLING IN BOTH HANDS: Primary | ICD-10-CM

## 2023-12-22 DIAGNOSIS — R20.0 NUMBNESS AND TINGLING IN BOTH HANDS: Primary | ICD-10-CM

## 2023-12-22 PROCEDURE — 99213 OFFICE O/P EST LOW 20 MIN: CPT | Performed by: NURSE PRACTITIONER

## 2023-12-22 RX ORDER — TRAZODONE HYDROCHLORIDE 50 MG/1
50 TABLET, FILM COATED ORAL
COMMUNITY
Start: 2023-11-28

## 2023-12-22 RX ORDER — HYDROXYZINE HYDROCHLORIDE 10 MG/1
1 TABLET, FILM COATED ORAL
COMMUNITY
Start: 2023-11-28 | End: 2024-09-11

## 2023-12-22 ASSESSMENT — PATIENT HEALTH QUESTIONNAIRE - PHQ9
10. IF YOU CHECKED OFF ANY PROBLEMS, HOW DIFFICULT HAVE THESE PROBLEMS MADE IT FOR YOU TO DO YOUR WORK, TAKE CARE OF THINGS AT HOME, OR GET ALONG WITH OTHER PEOPLE: SOMEWHAT DIFFICULT
SUM OF ALL RESPONSES TO PHQ QUESTIONS 1-9: 8
SUM OF ALL RESPONSES TO PHQ QUESTIONS 1-9: 8

## 2023-12-22 NOTE — COMMUNITY RESOURCES LIST (ENGLISH)
12/22/2023   [facility]  N/A  For questions about this resource list or additional care needs, please contact your primary care clinic or care manager.  Phone: [phone]   Email: N/A   Address: [address] [place] [address]   Hours: N/A        Food and Nutrition       Food pantry  1  [employer] - Drive Up Food Market Distance: 1.52 miles      [place] [id number] [address] [place] [address]  Language: English, Tajik  Hours: Thu 2:00 PM - 6:00 PM , Fri 10:00 AM - 2:00 PM  Fees: Free   Phone: (237) 756-3864 Email: naz@[employer] Website: https://[employer]/     2  [facility] - Food Distribution Program Distance: 2.12 miles      In-Person   [address] [place] [address]  Language: English, Hmong, Tajik  Hours: Tue 3:00 PM - 5:00 PM  Fees: Free   Phone: (497) 302-6104 Email: info@[employer] Website: http://[facility] Cent[facility]reyeSight Mobile TechnologiesSouth [facility].org/programs/zzhcpj-wpxwidmjd-kgwmep/     SNAP application assistance  3  [facility] - Economic Support [facility] Distance: 2.74 miles      In-Person, Phone/Virtual   [address] [place] [address]  Language: English  Hours: Mon - Fri 8:00 AM - 4:30 PM  Fees: Free   Phone: (507) 165-6188 Email: arjun@[facility]. Website: https://www.[facility]./787/Economic-Support     4  Com[employer]idades Latinas Unidas En Servicio (CLUES) [facility] Distance: 4.9 miles      In-Person   [address] [place] [address]  Language: English, Tajik  Hours: Mon - Fri 8:30 AM - 5:00 PM  Fees: Free   Phone: (667) 262-9125 Email: info@clues.org Website: http://www.clues.org     Soup kitchen or free meals  5  [facility] - Loaves and Fishes - Loaves and Fishes Distance: 4.64 miles      Pickup   [address] [place] [address]  Language: English  Hours: Wed 5:30 PM - 6:30 PM  Fees: Free   Phone: (251) 618-9017  Email: office@[facility].org Website: https://www.[facility].org     6  [facility] - Take 'n Bake Meals Distance: 5.2 miles      Pickup   [address] [place] [address]  Language: English  Hours: Mon 3:00 PM - 8:00 PM , Tue - Fri 5:00 AM - 8:00 PM , Sat 7:00 AM - 2:00 PM  Fees: Free   Phone: (650) 726-2643 Website: https://communityed.[facility].org/          Transportation       Free or low-cost transportation  7  [facility]  Office - [facility] - Gas vouchers and transportation assistance - Free or low-cost transportation Distance: 1.78 miles      In-Person, Phone/Virtual   [address] [place] [address]  Language: English  Hours: Mon - Fri 8:00 AM - 12:00 PM , Mon - Fri 1:00 PM - 4:00 PM  Fees: Free   Phone: (707) 946-5734 Email: tiffanie@[facility] Website: https://[facility]/social-services-office-[place]/     8  DAR - [facility] Circulator Bus Distance: 6.28 miles      In-Person   [address] [place] [address]  Language: English  Hours: Tue 9:00 AM - 2:00 PM  Fees: Self Pay   Phone: (244) 597-3227 Email: info@[employer]im Website: http://www.[employer].org/     Transportation to medical appointments  9  [facility] - Non-Emergency Medical Transportation Distance: 6.72 miles      In-Person   [address] [place] [address]  Language: English  Hours: Mon - Fri 8:00 AM - 4:00 PM Appt. Only  Fees: Self Pay   Phone: (647) 265-2164 Website: http://www.[facility] Medical[facility].org/Medical-Services/Emergency-medical-services/Non-emergency-transportation/     10  Discover Ride Distance: 7.98 miles      In-Person   [address] [address]  Language: English  Hours: Mon - Thu 6:00 AM - 6:00 PM , Fri 6:00 AM - 5:00 PM  Fees: Insurance, Self Pay   Phone: (527) 466-9994 Email: office@[employer] Website: https://www.[employer]/           Important Numbers & Websites       Emergency Services   [address]  [place] [address]  Poison Control   (795) 116-4077  Suicide Prevention Lifeline   (659) 510-9247 (TALK)  Child Abuse Hotline   (799) 356-6316 (4-A-Child)  Sexual Assault Hotline   (591) 150-2591 (HOPE)  National Runaway Safeline   (325) 347-6457 (RUNAWAY)  All-Options Talkline   (796) 636-3971  Substance Abuse Referral   (283) 951-8688 (HELP)
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
## 2023-12-22 NOTE — COMMUNITY RESOURCES LIST (ENGLISH)
12/22/2023   [facility]  N/A  For questions about this resource list or additional care needs, please contact your primary care clinic or care manager.  Phone: [phone]   Email: N/A   Address: [address] [place] [address]   Hours: N/A        Food and Nutrition       Food pantry  1  Open Cupboard - Drive Up Food Market Distance: 1.52 miles      [place] [id number] [place] [address]  Language: English, Thai  Hours: Thu 2:00 PM - 6:00 PM , Fri 10:00 AM - 2:00 PM  Fees: Free   Phone: (398) 997-7715 Email: naz@[employer] Website: https://[employer]/     2  [facility] - Food Distribution Program Distance: 2.12 miles      In-Person   [address] [place] [address]  Language: English, Hmong, Thai  Hours: Tue 3:00 PM - 5:00 PM  Fees: Free   Phone: (968) 151-6712 Email: info@[employer] Website: http://[facility].org/programs/kkixmf-ggdrsjyww-onxkaq/     SNAP application assistance  3  [facility] - Economic Support [facility] Distance: 2.74 miles      In-Person, Phone/Virtual   [address] [place] [address]  Language: English  Hours: Mon - Fri 8:00 AM - 4:30 PM  Fees: Free   Phone: (239) 640-8174 Email: arjun@[facility]. Website: https://www.[facility]./787/Economic-Support     4  Comunidades Latinas Unidas En Servicio (CLUES) [facility] Distance: 4.9 miles      In-Person   [address] [place] [address]  Language: English, Thai  Hours: Mon - Fri 8:30 AM - 5:00 PM  Fees: Free   Phone: (208) 800-4285 Email: info@clues.org Website: http://www.clues.org     Soup kitchen or free meals  5  [facility] - Loaves and Fishes - Loaves and Fishes Distance: 4.64 miles      Pickup   [address] [place] [address]  Language: English  Hours: Wed 5:30 PM - 6:30 PM  Fees: Free   Phone: (528) 405-9294  Email: office@[facility].org Website: https://www.[facility].org     6  [facility] - Take 'n Bake Meals Distance: 5.2 miles      Pickup   [address] [place] [address]  Language: English  Hours: Mon 3:00 PM - 8:00 PM , Tue - Fri 5:00 AM - 8:00 PM , Sat 7:00 AM - 2:00 PM  Fees: Free   Phone: (853) 458-2941 Website: https://communityed.[facility].org/          Transportation       Free or low-cost transportation  7  [facility]  Office - [facility] - Gas vouchers and transportation assistance - Free or low-cost transportation Distance: 1.78 miles      In-Person, Phone/Virtual   [address] [place] [address]  Language: English  Hours: Mon - Fri 8:00 AM - 12:00 PM , Mon - Fri 1:00 PM - 4:00 PM  Fees: Free   Phone: (738) 928-9229 Email: tiffanie@[facility] Website: https://[facility]/social-services-office-[place]/     8  DAR - The [facility] Circulator Bus Distance: 6.28 miles      In-Person   [address] [place] [address]  Language: English  Hours: Tue 9:00 AM - 2:00 PM  Fees: Self Pay   Phone: (945) 755-5285 Email: info@[employer] Website: http://www.[employer].org/     Transportation to medical appointments  9  [facility] - Non-Emergency Medical Transportation Distance: 6.72 miles      In-Person   [address] [place] [address]  Language: English  Hours: Mon - Fri 8:00 AM - 4:00 PM Appt. Only  Fees: Self Pay   Phone: (243) 237-5399 Website: http://www.allTrendsettershealth.org/Medical-Services/Emergency-medical-services/Non-emergency-transportation/     10  Discover Ride Distance: 7.98 miles      In-Person   [address] [address]  Language: English  Hours: Mon - Thu 6:00 AM - 6:00 PM , Fri 6:00 AM - 5:00 PM  Fees: Insurance, Self Pay   Phone: (586) 720-6536 Email: office@[employer] Website: https://www.[employer]/           Important Numbers & Websites       Emergency Services   [address]  [place] [address]  Poison Control   (218) 999-5057  Suicide Prevention Lifeline   (149) 229-8927 (TALK)  Child Abuse Hotline   (757) 431-5120 (4-A-Child)  Sexual Assault Hotline   (352) 274-1429 (HOPE)  National Runaway Safeline   (257) 281-6564 (RUNAWAY)  All-Options Talkline   (992) 280-5925  Substance Abuse Referral   (109) 107-9865 (HELP)

## 2023-12-22 NOTE — LETTER
December 22, 2023      Luis Antonio Laura  [address] [address]        To Whom It May Concern:    Luis Antonio Laura  was seen on 12-.  Please excuse today due to hand pain.        Sincerely,        BRYN Juarez CNP

## 2023-12-25 ASSESSMENT — ENCOUNTER SYMPTOMS
NECK PAIN: 0
NECK STIFFNESS: 0
BACK PAIN: 0
NUMBNESS: 1

## 2024-02-29 NOTE — PROGRESS NOTES
----- Message from Mitra Brown sent at 2/29/2024  8:41 AM CST -----  Contact: Patient, [phone]  Calling because she was told she had gallstones. No one ever told her what to do. Never got the colonoscopy results. Please call her. Thanks.     Assessment and Plan:     1. Suppurative hidradenitis  minocycline (MINOCIN,DYNACIN) 100 MG capsule    clindamycin phosphate (CLINDAGEL) 1 % gel    Ambulatory referral to Dermatology    Culture, Wound   2. Persistent insomnia  Ambulatory referral to Sleep Medicine   3. Severe recurrent major depression without psychotic features (H)  Ambulatory referral to Psychiatry   4. Generalized anxiety disorder  Ambulatory referral to Psychiatry   5. Alcohol abuse, episodic drinking behavior  Ambulatory referral to Psychiatry     Will treat suppurative hidradenitis with minocycline.  Educated on its indications and side effects. She is instructed to avoid sun exposure or wear sunscreen.  Provided prescription for Clindamycin gel for future use.  I obtained a wound culture and will refer to dermatology due to frequent reoccurrence. For insomnia, I suggest taking Hydroxyzine  mg at bedtime.  She is to avoid taking this with other sedatives including trazodone, gabapentin, and alcohol.  Will refer to psychiatry for further evaluation of depression and anxiety.  She is to continue the Abilify.  I offered referral to alcohol treatment, but she declines.  I encouraged close follow-up in one month for medication management or sooner with any further concerns.  She is content with the plan.  Her medications were reconciled this visit.     Subjective:     Luis Antonio is a 28 y.o. female presenting to the clinic for follow-up on hospitalization.  I reviewed the documentation from her hospitalization. Patient presented to the ER on 4/23/2019 with concerns of suicidal ideation.  Patient had noticed an increase in depression over the past few weeks.  6 days prior to hospitalization, she tried to overdose on ibuprofen, but vomited as the pills were large.  She had never taken an antidepressant for her depression and anxiety.  She did call the employee assistance program and spoke to a counselor who referred her to the emergency room.   Patient has been self-medicating with alcohol to help her sleep at night.  Patient told the ER that she starts with a cup of wine and progresses to a bottle of wine a night.  She will then switch to vodka and whiskey if the wine does not help her sleep.  She will take a break from alcohol when she sleeps well and then resume the alcohol consumption when her insomnia worsens.  Patient lives with her brother who does not have a job.  She has 2 sons ages 8 years old and the other is 6 months old.  Patient was treated for insomnia during her admission with the trazodone 50 mg nightly.  She is also taking Abilify 2 mg daily.  She was to follow-up with psychiatry.  Patient presents today stating that the trazodone does not provide assistance.  She has discontinued the trazodone has and has resumed her alcohol consumption.  She typically wakes up between 3 to 3:30 AM and will be awake until 5:30 AM.  Patient feels as though her mood is either great or really low.  She denies current thoughts of suicide.  Her mother lives nearby and does support her so she does obtain breaks from her children.  She has not scheduled a psychiatric appointment. She denies thoughts of suicide today.  Patient is concerned of ongoing suppurativa hidradenitis.  Patient has been diagnosed with this in the past.  She obtains skin lesions monthly.  In early March, she developed multiple under her right arm.  She has had difficulty lifting her arm due to the pain.  She has noticed some discharge.  No fever has been present.  She has tried doxycycline and cephalexin in the past which typically do not provide relief.    Review of Systems: A complete 14 point review of systems was obtained and is negative or as stated in the history of present illness.    Social History     Socioeconomic History     Marital status: Single     Spouse name: Not on file     Number of children: Not on file     Years of education: Not on file     Highest education level: Not  "on file   Occupational History     Not on file   Social Needs     Financial resource strain: Not on file     Food insecurity:     Worry: Not on file     Inability: Not on file     Transportation needs:     Medical: Not on file     Non-medical: Not on file   Tobacco Use     Smoking status: Former Smoker     Years: 7.00     Smokeless tobacco: Never Used   Substance and Sexual Activity     Alcohol use: Yes     Comment: occasional     Drug use: No     Sexual activity: Not on file   Lifestyle     Physical activity:     Days per week: Not on file     Minutes per session: Not on file     Stress: Not on file   Relationships     Social connections:     Talks on phone: Not on file     Gets together: Not on file     Attends Muslim service: Not on file     Active member of club or organization: Not on file     Attends meetings of clubs or organizations: Not on file     Relationship status: Not on file     Intimate partner violence:     Fear of current or ex partner: Not on file     Emotionally abused: Not on file     Physically abused: Not on file     Forced sexual activity: Not on file   Other Topics Concern     Not on file   Social History Narrative     Not on file       Active Ambulatory Problems     Diagnosis Date Noted     Severe recurrent major depression without psychotic features (H) 04/24/2019     Persistent insomnia 04/24/2019     Generalized anxiety disorder 04/24/2019     Alcohol abuse, episodic drinking behavior 04/24/2019     Resolved Ambulatory Problems     Diagnosis Date Noted     No Resolved Ambulatory Problems     Past Medical History:   Diagnosis Date     Cholelithiases      IUD (intrauterine device) in place      Obesity        No family history on file.    Objective:     /72   Pulse 96   Ht 5' 3\" (1.6 m)   Wt (!) 285 lb 8 oz (129.5 kg)   LMP 04/28/2019   SpO2 100%   BMI 50.57 kg/m      Patient is alert, in no obvious distress. She is morbidly obese.    Skin: Warm, dry.  She has multiple scars " present within her left axilla.  She has a small boil present which is draining purulent drainage. I obtaining a wound culture.   Lungs:  Clear to auscultation. Respirations even and unlabored.  No wheezing or rales noted.   Heart:  Regular rate and rhythm.  No murmurs, S3, S4, gallops, or rubs.    Abdomen: Soft, nontender.  No organomegaly. Bowel sounds normoactive. No guarding or masses noted.

## 2024-05-01 ENCOUNTER — E-VISIT (OUTPATIENT)
Dept: URGENT CARE | Facility: CLINIC | Age: 34
End: 2024-05-01
Payer: COMMERCIAL

## 2024-05-01 DIAGNOSIS — R21 RASH: Primary | ICD-10-CM

## 2024-05-01 PROCEDURE — 99207 PR NON-BILLABLE SERV PER CHARTING: CPT | Performed by: PHYSICIAN ASSISTANT

## 2024-05-01 NOTE — PATIENT INSTRUCTIONS
Dear Luis Antonio Laura,    We are sorry you are not feeling well. Based on the responses you provided, it is recommended that you be seen in-person in urgent care so we can better evaluate your symptoms. Please click here to find the nearest urgent care location to you.   You will not be charged for this Visit. Thank you for trusting us with your care.    Albino Hilton PA-C

## 2024-05-02 ENCOUNTER — TRANSFERRED RECORDS (OUTPATIENT)
Dept: HEALTH INFORMATION MANAGEMENT | Facility: CLINIC | Age: 34
End: 2024-05-02
Payer: COMMERCIAL

## 2024-05-30 ENCOUNTER — PATIENT OUTREACH (OUTPATIENT)
Dept: CARE COORDINATION | Facility: CLINIC | Age: 34
End: 2024-05-30
Payer: COMMERCIAL

## 2024-06-13 ENCOUNTER — PATIENT OUTREACH (OUTPATIENT)
Dept: CARE COORDINATION | Facility: CLINIC | Age: 34
End: 2024-06-13
Payer: COMMERCIAL

## 2024-09-09 ENCOUNTER — E-VISIT (OUTPATIENT)
Dept: URGENT CARE | Facility: CLINIC | Age: 34
End: 2024-09-09
Payer: COMMERCIAL

## 2024-09-09 DIAGNOSIS — R21 RASH: Primary | ICD-10-CM

## 2024-09-09 PROCEDURE — 99207 PR NON-BILLABLE SERV PER CHARTING: CPT | Performed by: FAMILY MEDICINE

## 2024-09-09 NOTE — PATIENT INSTRUCTIONS
Dear Luis Antonio Laura?     After reviewing your responses, I am unable to make a diagnosis that can be treated online.    You will not be charged for this eVisit.     We are dedicated to helping you achieve your best health and would like to see you in one of our many clinic locations - a primary care provider would be ideal for your concern.    Please use [employer] to schedule a visit with a provider or call [phone] ([phone]) to schedule at any of our locations.    Thanks for choosing?us?as your health care partner,?   ?   BRYN Ramirez CNP?

## 2024-09-11 ENCOUNTER — OFFICE VISIT (OUTPATIENT)
Dept: FAMILY MEDICINE | Facility: CLINIC | Age: 34
End: 2024-09-11
Payer: COMMERCIAL

## 2024-09-11 VITALS
RESPIRATION RATE: 20 BRPM | HEART RATE: 90 BPM | WEIGHT: 292 LBS | SYSTOLIC BLOOD PRESSURE: 132 MMHG | BODY MASS INDEX: 53.73 KG/M2 | OXYGEN SATURATION: 98 % | HEIGHT: 62 IN | DIASTOLIC BLOOD PRESSURE: 72 MMHG | TEMPERATURE: 98 F

## 2024-09-11 DIAGNOSIS — E66.01 MORBID OBESITY (H): ICD-10-CM

## 2024-09-11 DIAGNOSIS — F41.1 GENERALIZED ANXIETY DISORDER: ICD-10-CM

## 2024-09-11 DIAGNOSIS — R73.03 PRE-DIABETES: ICD-10-CM

## 2024-09-11 DIAGNOSIS — N92.0 MENORRHAGIA WITH REGULAR CYCLE: ICD-10-CM

## 2024-09-11 DIAGNOSIS — Z13.220 LIPID SCREENING: ICD-10-CM

## 2024-09-11 DIAGNOSIS — N94.6 PAINFUL MENSTRUATION: ICD-10-CM

## 2024-09-11 DIAGNOSIS — F33.2 SEVERE EPISODE OF RECURRENT MAJOR DEPRESSIVE DISORDER, WITHOUT PSYCHOTIC FEATURES (H): ICD-10-CM

## 2024-09-11 DIAGNOSIS — Z00.00 ROUTINE GENERAL MEDICAL EXAMINATION AT A HEALTH CARE FACILITY: Primary | ICD-10-CM

## 2024-09-11 DIAGNOSIS — L73.2 HIDRADENITIS SUPPURATIVA: ICD-10-CM

## 2024-09-11 LAB
ERYTHROCYTE [DISTWIDTH] IN BLOOD BY AUTOMATED COUNT: 14.5 % (ref 10–15)
HBA1C MFR BLD: 5.9 % (ref 0–5.6)
HCT VFR BLD AUTO: 31.2 % (ref 35–47)
HGB BLD-MCNC: 9.7 G/DL (ref 11.7–15.7)
MCH RBC QN AUTO: 26.1 PG (ref 26.5–33)
MCHC RBC AUTO-ENTMCNC: 31.1 G/DL (ref 31.5–36.5)
MCV RBC AUTO: 84 FL (ref 78–100)
PLATELET # BLD AUTO: 458 10E3/UL (ref 150–450)
RBC # BLD AUTO: 3.72 10E6/UL (ref 3.8–5.2)
WBC # BLD AUTO: 8.2 10E3/UL (ref 4–11)

## 2024-09-11 PROCEDURE — 99395 PREV VISIT EST AGE 18-39: CPT | Performed by: NURSE PRACTITIONER

## 2024-09-11 PROCEDURE — 36415 COLL VENOUS BLD VENIPUNCTURE: CPT | Performed by: NURSE PRACTITIONER

## 2024-09-11 PROCEDURE — 80061 LIPID PANEL: CPT | Performed by: NURSE PRACTITIONER

## 2024-09-11 PROCEDURE — 80053 COMPREHEN METABOLIC PANEL: CPT | Performed by: NURSE PRACTITIONER

## 2024-09-11 PROCEDURE — 99214 OFFICE O/P EST MOD 30 MIN: CPT | Mod: 25 | Performed by: NURSE PRACTITIONER

## 2024-09-11 PROCEDURE — 96127 BRIEF EMOTIONAL/BEHAV ASSMT: CPT | Performed by: NURSE PRACTITIONER

## 2024-09-11 PROCEDURE — 85027 COMPLETE CBC AUTOMATED: CPT | Performed by: NURSE PRACTITIONER

## 2024-09-11 PROCEDURE — 83036 HEMOGLOBIN GLYCOSYLATED A1C: CPT | Performed by: NURSE PRACTITIONER

## 2024-09-11 RX ORDER — HYDROXYZINE HYDROCHLORIDE 10 MG/1
10 TABLET, FILM COATED ORAL EVERY 8 HOURS PRN
Qty: 90 TABLET | Refills: 2 | Status: SHIPPED | OUTPATIENT
Start: 2024-09-11

## 2024-09-11 RX ORDER — IBUPROFEN 600 MG/1
600 TABLET, FILM COATED ORAL EVERY 6 HOURS PRN
Qty: 90 TABLET | Refills: 3 | Status: SHIPPED | OUTPATIENT
Start: 2024-09-11

## 2024-09-11 ASSESSMENT — ASTHMA QUESTIONNAIRES
QUESTION_1 LAST FOUR WEEKS HOW MUCH OF THE TIME DID YOUR ASTHMA KEEP YOU FROM GETTING AS MUCH DONE AT WORK, SCHOOL OR AT HOME: NONE OF THE TIME
ACT_TOTALSCORE: 21
QUESTION_2 LAST FOUR WEEKS HOW OFTEN HAVE YOU HAD SHORTNESS OF BREATH: ONCE A DAY
QUESTION_4 LAST FOUR WEEKS HOW OFTEN HAVE YOU USED YOUR RESCUE INHALER OR NEBULIZER MEDICATION (SUCH AS ALBUTEROL): ONCE A WEEK OR LESS
ACT_TOTALSCORE: 21
QUESTION_3 LAST FOUR WEEKS HOW OFTEN DID YOUR ASTHMA SYMPTOMS (WHEEZING, COUGHING, SHORTNESS OF BREATH, CHEST TIGHTNESS OR PAIN) WAKE YOU UP AT NIGHT OR EARLIER THAN USUAL IN THE MORNING: NOT AT ALL
QUESTION_5 LAST FOUR WEEKS HOW WOULD YOU RATE YOUR ASTHMA CONTROL: COMPLETELY CONTROLLED

## 2024-09-11 ASSESSMENT — PAIN SCALES - GENERAL: PAINLEVEL: NO PAIN (0)

## 2024-09-11 NOTE — PATIENT INSTRUCTIONS
Patient Education   Preventive Care Advice   This is general advice given by our system to help you stay healthy. However, your care team may have specific advice just for you. Please talk to your care team about your preventive care needs.  Nutrition  Eat 5 or more servings of fruits and vegetables each day.  Try wheat bread, brown rice and whole grain pasta (instead of white bread, rice, and pasta).  Get enough calcium and vitamin D. Check the label on foods and aim for 100% of the RDA (recommended daily allowance).  Lifestyle  Exercise at least 150 minutes each week  (30 minutes a day, 5 days a week).  Do muscle strengthening activities 2 days a week. These help control your weight and prevent disease.  No smoking.  Wear sunscreen to prevent skin cancer.  Have a dental exam and cleaning every 6 months.  Yearly exams  See your health care team every year to talk about:  Any changes in your health.  Any medicines your care team has prescribed.  Preventive care, family planning, and ways to prevent chronic diseases.  Shots (vaccines)   HPV shots (up to age 26), if you've never had them before.  Hepatitis B shots (up to age 59), if you've never had them before.  COVID-19 shot: Get this shot when it's due.  Flu shot: Get a flu shot every year.  Tetanus shot: Get a tetanus shot every 10 years.  Pneumococcal, hepatitis A, and RSV shots: Ask your care team if you need these based on your risk.  Shingles shot (for age 50 and up)  General health tests  Diabetes screening:  Starting at age 35, Get screened for diabetes at least every 3 years.  If you are younger than age 35, ask your care team if you should be screened for diabetes.  Cholesterol test: At age 39, start having a cholesterol test every 5 years, or more often if advised.  Bone density scan (DEXA): At age 50, ask your care team if you should have this scan for osteoporosis (brittle bones).  Hepatitis C: Get tested at least once in your life.  STIs (sexually  transmitted infections)  Before age 24: Ask your care team if you should be screened for STIs.  After age 24: Get screened for STIs if you're at risk. You are at risk for STIs (including HIV) if:  You are sexually active with more than one person.  You don't use condoms every time.  You or a partner was diagnosed with a sexually transmitted infection.  If you are at risk for HIV, ask about PrEP medicine to prevent HIV.  Get tested for HIV at least once in your life, whether you are at risk for HIV or not.  Cancer screening tests  Cervical cancer screening: If you have a cervix, begin getting regular cervical cancer screening tests starting at age 21.  Breast cancer scan (mammogram): If you've ever had breasts, begin having regular mammograms starting at age 40. This is a scan to check for breast cancer.  Colon cancer screening: It is important to start screening for colon cancer at age 45.  Have a colonoscopy test every 10 years (or more often if you're at risk) Or, ask your provider about stool tests like a FIT test every year or Cologuard test every 3 years.  To learn more about your testing options, visit:   .  For help making a decision, visit:   https://bit.ly/uu36740.  Prostate cancer screening test: If you have a prostate, ask your care team if a prostate cancer screening test (PSA) at age 55 is right for you.  Lung cancer screening: If you are a current or former smoker ages 50 to 80, ask your care team if ongoing lung cancer screenings are right for you.  For informational purposes only. Not to replace the advice of your health care provider. Copyright   2023 [facility]. All rights reserved. Clinically reviewed by the [facility]. Gient [id number] - REV 01/24.  Learning About Stress  What is stress?     Stress is your body's response to a hard situation. Your body can have a physical, emotional, or mental response. Stress is a fact of life for most people, and it  affects everyone differently. What causes stress for you may not be stressful for someone else.  A lot of things can cause stress. You may feel stress when you go on a job interview, take a test, or run a race. This kind of short-term stress is normal and even useful. It can help you if you need to work hard or react quickly. For example, stress can help you finish an important job on time.  Long-term stress is caused by ongoing stressful situations or events. Examples of long-term stress include long-term health problems, ongoing problems at work, or conflicts in your family. Long-term stress can harm your health.  How does stress affect your health?  When you are stressed, your body responds as though you are in danger. It makes hormones that speed up your heart, make you breathe faster, and give you a burst of energy. This is called the fight-or-flight stress response. If the stress is over quickly, your body goes back to normal and no harm is done.  But if stress happens too often or lasts too long, it can have bad effects. Long-term stress can make you more likely to get sick, and it can make symptoms of some diseases worse. If you tense up when you are stressed, you may develop neck, shoulder, or low back pain. Stress is linked to high blood pressure and heart disease.  Stress also harms your emotional health. It can make you mccall, tense, or depressed. Your relationships may suffer, and you may not do well at work or school.  What can you do to manage stress?  You can try these things to help manage stress:   Do something active. Exercise or activity can help reduce stress. Walking is a great way to get started. Even everyday activities such as housecleaning or yard work can help.  Try yoga or sera chi. These techniques combine exercise and meditation. You may need some training at first to learn them.  Do something you enjoy. For example, listen to music or go to a movie. Practice your hobby or do volunteer  "work.  Meditate. This can help you relax, because you are not worrying about what happened before or what may happen in the future.  Do guided imagery. Imagine yourself in any setting that helps you feel calm. You can use online videos, books, or a teacher to guide you.  Do breathing exercises. For example:  From a standing position, bend forward from the waist with your knees slightly bent. Let your arms dangle close to the floor.  Breathe in slowly and deeply as you return to a standing position. Roll up slowly and lift your head last.  Hold your breath for just a few seconds in the standing position.  Breathe out slowly and bend forward from the waist.  Let your feelings out. Talk, laugh, cry, and express anger when you need to. Talking with supportive friends or family, a counselor, or a mary leader about your feelings is a healthy way to relieve stress. Avoid discussing your feelings with people who make you feel worse.  Write. It may help to write about things that are bothering you. This helps you find out how much stress you feel and what is causing it. When you know this, you can find better ways to cope.  What can you do to prevent stress?  You might try some of these things to help prevent stress:  Manage your time. This helps you find time to do the things you want and need to do.  Get enough sleep. Your body recovers from the stresses of the day while you are sleeping.  Get support. Your family, friends, and community can make a difference in how you experience stress.  Limit your news feed. Avoid or limit time on social media or news that may make you feel stressed.  Do something active. Exercise or activity can help reduce stress. Walking is a great way to get started.  Where can you learn more?  Go to https://www.healthwise.net/patiented  Enter N032 in the search box to learn more about \"Learning About Stress.\"  Current as of: October 24, 2023               Content Version: 14.0    0902-9726 " [employer].   Care instructions adapted under license by your healthcare professional. If you have questions about a medical condition or this instruction, always ask your healthcare professional. [employer] disclaims any warranty or liability for your use of this information.      Learning About Depression Screening  What is depression screening?  Depression screening is a way to see if you have depression symptoms. It may be done by a doctor or counselor. It's often part of a routine checkup. That's because your mental health is just as important as your physical health.  Depression is a mental health condition that affects how you feel, think, and act. You may:  Have less energy.  Lose interest in your daily activities.  Feel sad and grouchy for a long time.  Depression is very common. It affects people of all ages.  Many things can lead to depression. Some people become depressed after they have a stroke or find out they have a major illness like cancer or heart disease. The death of a loved one or a breakup may lead to depression. It can run in families. Most experts believe that a combination of inherited genes and stressful life events can cause it.  What happens during screening?  You may be asked to fill out a form about your depression symptoms. You and the doctor will discuss your answers. The doctor may ask you more questions to learn more about how you think, act, and feel.  What happens after screening?  If you have symptoms of depression, your doctor will talk to you about your options.  Doctors usually treat depression with medicines or counseling. Often, combining the two works best. Many people don't get help because they think that they'll get over the depression on their own. But people with depression may not get better unless they get treatment.  The cause of depression is not well understood. There may be many factors involved. But if you have depression, it's not  "your fault.  A serious symptom of depression is thinking about death or suicide. If you or someone you care about talks about this or about feeling hopeless, get help right away.  It's important to know that depression can be treated. Medicine, counseling, and self-care may help.  Where can you learn more?  Go to https://www.[employer].net/patiented  Enter T185 in the search box to learn more about \"Learning About Depression Screening.\"  Current as of: June 24, 2023  Content Version: 14.1 2006-2024 [employer].   Care instructions adapted under license by your healthcare professional. If you have questions about a medical condition or this instruction, always ask your healthcare professional. [employer] disclaims any warranty or liability for your use of this information.    9 Ways to Cut Back on Drinking  Maybe you've found yourself drinking more alcohol than you'd prefer. If you want to cut back, here are some ideas to try.    Think before you drink.  Do you really want a drink, or is it just a habit? If you're used to having a drink at a certain time, try doing something else then.     Look for substitutes.  Find some no-alcohol drinks that you enjoy, like flavored seltzer water, tea with honey, or tonic with a slice of lime. Or try alcohol-free beer or \"virgin\" cocktails (without the alcohol).     Drink more water.  Use water to quench your thirst. Drink a glass of water before you have any alcohol. Have another glass along with every drink or between drinks.     Shrink your drink.  For example, have a bottle of beer instead of a pint. Use a smaller glass for wine. Choose drinks with lower alcohol content (ABV%). Or use less liquor and more mixer in cocktails.     Slow down.  It's easy to drink quickly and without thinking about it. Pay attention, and make each drink last longer.     Do the math.  Total up how much you spend on alcohol each month. How much is that a year? If you cut " "back, what could you do with the money you save?     Take a break.  Choose a day or two each week when you won't drink at all. Notice how you feel on those days, physically and emotionally. How did you sleep? Do you feel better? Over time, add more break days.     Count calories.  Would you like to lose some weight? For some people that's a good motivator for cutting back. Figure out how many calories are in each drink. How many does that add up to in a day? In a week? In a month?     Practice saying no.  Be ready when someone offers you a drink. Try: \"Thanks, I've had enough.\" Or \"Thanks, but I'm cutting back.\" Or \"No, thanks. I feel better when I drink less.\"   Current as of: November 15, 2023  Content Version: 14.1 2006-2024 [employer].   Care instructions adapted under license by your healthcare professional. If you have questions about a medical condition or this instruction, always ask your healthcare professional. [employer] disclaims any warranty or liability for your use of this information.  Substance Use Disorder: Care Instructions  Overview     You can improve your life and health by stopping your use of alcohol or drugs. When you don't drink or use drugs, you may feel and sleep better. You may get along better with your family, friends, and coworkers. There are medicines and programs that can help with substance use disorder.  How can you care for yourself at home?  Here are some ways to help you stay sober and prevent relapse.  If you have been given medicine to help keep you sober or reduce your cravings, be sure to take it exactly as prescribed.  Talk to your doctor about programs that can help you stop using drugs or drinking alcohol.  Do not keep alcohol or drugs in your home.  Plan ahead. Think about what you'll say if other people ask you to drink or use drugs. Try not to spend time with people who drink or use drugs.  Use the time and money spent on drinking or " drugs to do something that's important to you.  Preventing a relapse  Have a plan to deal with relapse. Learn to recognize changes in your thinking that lead you to drink or use drugs. Get help before you start to drink or use drugs again.  Try to stay away from situations, friends, or places that may lead you to drink or use drugs.  If you feel the need to drink alcohol or use drugs again, seek help right away. Call a trusted friend or family member. Some people get support from organizations such as Narcotics Anonymous or [employer] or from treatment facilities.  If you relapse, get help as soon as you can. Some people make a plan with another person that outlines what they want that person to do for them if they relapse. The plan usually includes how to handle the relapse and who to notify in case of relapse.  Don't give up. Remember that a relapse doesn't mean that you have failed. Use the experience to learn the triggers that lead you to drink or use drugs. Then quit again. Recovery is a lifelong process. Many people have several relapses before they are able to quit for good.  Follow-up care is a key part of your treatment and safety. Be sure to make and go to all appointments, and call your doctor if you are having problems. It's also a good idea to know your test results and keep a list of the medicines you take.  When should you call for help?   Call 911  anytime you think you may need emergency care. For example, call if you or someone else:    Has overdosed or has withdrawal signs. Be sure to tell the emergency workers that you are or someone else is using or trying to quit using drugs. Overdose or withdrawal signs may include:  Losing consciousness.  Seizure.  Seeing or hearing things that aren't there (hallucinations).     Is thinking or talking about suicide or harming others.   Where to get help 24 hours a day, 7 days a week   If you or someone you know talks about suicide, self-harm, a mental  "health crisis, a substance use crisis, or any other kind of emotional distress, get help right away. You can:    Call the Suicide and Crisis Lifeline at 988.     Call 5-361-280-BBGD (1-246.436.1598).     Text HOME to 003867 to access the Crisis Text Line.   Consider saving these numbers in your phone.  Go to [employer] for more information or to chat online.  Call your doctor now or seek immediate medical care if:    You are having withdrawal symptoms. These may include nausea or vomiting, sweating, shakiness, and anxiety.   Watch closely for changes in your health, and be sure to contact your doctor if:    You have a relapse.     You need more help or support to stop.   Where can you learn more?  Go to https://www.[employer].net/patiented  Enter H573 in the search box to learn more about \"Substance Use Disorder: Care Instructions.\"  Current as of: November 15, 2023               Content Version: 14.0    8901-8663 [employer].   Care instructions adapted under license by your healthcare professional. If you have questions about a medical condition or this instruction, always ask your healthcare professional. [employer] disclaims any warranty or liability for your use of this information.         "

## 2024-09-11 NOTE — PROGRESS NOTES
Assessment & Plan     Routine general medical examination at a [facility] care facility  Routine annual physical examination completed today.  We discussed routine healthcare maintenance including importance of healthy diet and regular exercise.  Referral for weight loss clinic placed today.  She is up-to-date on Pap smear.  She declines immunizations today.  Lab work will be completed as noted below and I will follow-up with results when available.  She is advised to return to clinic in 1 year for next physical examination or sooner with any new concerns.    Morbid obesity (H)  Referral to weight management clinic placed today.  Patient is interested in discussing both medical management and meeting with nutritionist.  -Referral to weight management clinic    Painful menstruation  Menorrhagia with regular cycle  Patient has history of painful menstruation and menorrhagia.  Refill for ibuprofen placed today.  We discussed the option to restart oral contraception as she was previously using this for management of menorrhagia.  She prefers to wait and discuss with OB/GYN.  Referral to OB/GYN placed per patient request.  - ibuprofen (ADVIL/MOTRIN) 600 MG tablet  Dispense: 90 tablet; Refill: 3  - Ob/Gyn  Referral    Generalized anxiety disorder  Severe episode of recurrent major depressive disorder, without psychotic features (H)  Patient is encouraged to schedule follow-up with psychiatrist since it has been a while.  She is no longer using olanzapine but continues venlafaxine, hydroxyzine and trazodone.  Refill of hydroxyzine placed today.  She denies any severe episodes of depression or anxiety recently.  - hydrOXYzine HCl (ATARAX) 10 MG tablet  Dispense: 90 tablet; Refill: 2    Hidradenitis suppurativa  Follows closely with dermatology.  Remains on Humira.     Pre-diabetes  History of prediabetes noted.  Will check A1c today.  -Hemoglobin A1c    Lipid screening  Will check fasting lipids today.  - Lipid panel  "reflex to direct LDL Fasting  - Lipid panel reflex to direct LDL Fasting          BMI  Estimated body mass index is 53.41 kg/m  as calculated from the following:    Height as of this encounter: 1.575 m (5' 2\").    Weight as of this encounter: 132.5 kg (292 lb).   Weight management plan: Patient has [employer] employee Insurance plan and was referred to the [facility] GLP-1 Weight Loss RX Criteria    Counseling  Appropriate preventive services were addressed with this patient via screening, questionnaire, or discussion as appropriate for fall prevention, nutrition, physical activity, Tobacco-use cessation, social engagement, weight loss and cognition.  Checklist reviewing preventive services available has been given to the patient.  Reviewed patient's diet, addressing concerns and/or questions.   She is at risk for lack of exercise and has been provided with information to increase physical activity for the benefit of her well-being.   Patient is at risk for social isolation and has been provided with information about the benefit of social connection.   The patient was instructed to see the dentist every 6 months.   The patient reports drinking more than 3 alcoholic drinks per day and/or more than 7 drhnks per week. The patient was counseled and given information about possible harmful effects of excessive alcohol intake.The patient's PHQ-9 score is consistent with moderate depression. She was provided with information regarding depression.       Work on weight loss  Regular exercise    Baldomero Salmon is a 34 year old, presenting for the following health issues:  Recheck Medication, Physical, and History of Present Illness (/)    Patient presents today for routine annual physical examination.  She would also like to follow-up on recent urgent care visit.  The patient was evaluated in urgent care on 9/1/2024 with severe menstrual cramps.  She states that she has always had cramps with her " "menstrual period but that it has gotten worse recently.  She was previously on oral contraception which seem to manage the cramps.  She has had her fallopian tubes removed.  She is no longer having symptoms now that her period is over and typically uses Tylenol and ibuprofen during her menstrual periods. She would be interested in meeting with OB/GYN to discuss other options for management of her menstrual periods prior to starting oral contraceptives again.    She has hidradenitis suppurative for which she follows with dermatology for.  She is on Humira for this.  She previously has been followed by psychiatry but states she is no longer taking her olanzapine as it was making her too tired.  She continues venlafaxine 75 mg 3 times a day and is requesting a refill of hydroxyzine.  She is following closely with her psychotherapist as well.    The patient has 3 children, recently  as of a few years ago.  She states that her diet is \"awful\" and she does not exercise regularly.  She would be interested in a referral to nutritionist.          9/11/2024     1:30 PM   Additional Questions   Roomed by am cma       Do you have a current opioid prescription? no  Do you use any other controlled substances or medications that are not prescribed by a provider? no       Review of Systems - pertinent positives noted in HPI, otherwise ROS is negative.        Objective    /72   Pulse 90   Temp 98  F (36.7  C)   Resp 20   Ht 1.575 m (5' 2\")   Wt 132.5 kg (292 lb)   SpO2 98%   BMI 53.41 kg/m    Body mass index is 53.41 kg/m .  Physical Exam     General Appearance:  Alert, obese female.  She is cooperative, no distress, appears stated age   HEENT:  Normal.  No acute findings.   Neck: Supple, symmetrical, no adenopathy.   Lungs:   Clear to auscultation bilaterally, respirations unlabored.  No expiratory wheeze or inspiratory crackles noted.   Heart:  Regular rate and rhythm, S1, S2 normal, no murmur, rub or gallop "   Abdomen:   Soft, non-tender, positive bowel sounds, no masses, no organomegaly   Extremities: Extremities normal.  No cyanosis or edema   Skin: Warm, dry.  Skin color, texture, turgor normal, no rashes or lesions   Neurologic: Nonfocal.               Signed Electronically by: BRYN Martinez CNP      Answers submitted by the patient for this visit:  Patient Health Questionnaire (Submitted on 9/11/2024)  If you checked off any problems, how difficult have these problems made it for you to do your work, take care of things at home, or get along with other people?: Very difficult  PHQ9 TOTAL SCORE: 12

## 2024-09-12 LAB
ALBUMIN SERPL BCG-MCNC: 3.6 G/DL (ref 3.5–5.2)
ALP SERPL-CCNC: 84 U/L (ref 40–150)
ALT SERPL W P-5'-P-CCNC: 16 U/L (ref 0–50)
ANION GAP SERPL CALCULATED.3IONS-SCNC: 7 MMOL/L (ref 7–15)
AST SERPL W P-5'-P-CCNC: 23 U/L (ref 0–45)
BILIRUB SERPL-MCNC: 0.4 MG/DL
BUN SERPL-MCNC: 10.1 MG/DL (ref 6–20)
CALCIUM SERPL-MCNC: 8.7 MG/DL (ref 8.8–10.4)
CHLORIDE SERPL-SCNC: 103 MMOL/L (ref 98–107)
CHOLEST SERPL-MCNC: 182 MG/DL
CREAT SERPL-MCNC: 0.58 MG/DL (ref 0.51–0.95)
EGFRCR SERPLBLD CKD-EPI 2021: >90 ML/MIN/1.73M2
FASTING STATUS PATIENT QL REPORTED: ABNORMAL
FASTING STATUS PATIENT QL REPORTED: ABNORMAL
GLUCOSE SERPL-MCNC: 87 MG/DL (ref 70–99)
HCO3 SERPL-SCNC: 25 MMOL/L (ref 22–29)
HDLC SERPL-MCNC: 56 MG/DL
LDLC SERPL CALC-MCNC: 112 MG/DL
NONHDLC SERPL-MCNC: 126 MG/DL
POTASSIUM SERPL-SCNC: 4.1 MMOL/L (ref 3.4–5.3)
PROT SERPL-MCNC: 8.3 G/DL (ref 6.4–8.3)
SODIUM SERPL-SCNC: 135 MMOL/L (ref 135–145)
TRIGL SERPL-MCNC: 71 MG/DL

## 2024-09-16 DIAGNOSIS — R79.89 ELEVATED PLATELET COUNT: ICD-10-CM

## 2024-09-16 DIAGNOSIS — D64.9 LOW HEMOGLOBIN: Primary | ICD-10-CM

## 2025-01-28 ENCOUNTER — OFFICE VISIT (OUTPATIENT)
Dept: MIDWIFE SERVICES | Facility: CLINIC | Age: 35
End: 2025-01-28
Attending: NURSE PRACTITIONER
Payer: COMMERCIAL

## 2025-01-28 VITALS
DIASTOLIC BLOOD PRESSURE: 86 MMHG | WEIGHT: 290 LBS | OXYGEN SATURATION: 99 % | HEART RATE: 84 BPM | SYSTOLIC BLOOD PRESSURE: 128 MMHG | BODY MASS INDEX: 53.04 KG/M2

## 2025-01-28 DIAGNOSIS — N94.6 PAINFUL MENSTRUATION: ICD-10-CM

## 2025-01-28 DIAGNOSIS — N92.0 MENORRHAGIA WITH REGULAR CYCLE: ICD-10-CM

## 2025-01-28 DIAGNOSIS — N94.6 PAINFUL MENSTRUAL PERIODS: Primary | ICD-10-CM

## 2025-01-28 LAB
C TRACH DNA SPEC QL PROBE+SIG AMP: NEGATIVE
ERYTHROCYTE [DISTWIDTH] IN BLOOD BY AUTOMATED COUNT: 14.6 % (ref 10–15)
HCT VFR BLD AUTO: 30.9 % (ref 35–47)
HGB BLD-MCNC: 9.8 G/DL (ref 11.7–15.7)
HIV 1+2 AB+HIV1 P24 AG SERPL QL IA: NONREACTIVE
MCH RBC QN AUTO: 26.6 PG (ref 26.5–33)
MCHC RBC AUTO-ENTMCNC: 31.7 G/DL (ref 31.5–36.5)
MCV RBC AUTO: 84 FL (ref 78–100)
N GONORRHOEA DNA SPEC QL NAA+PROBE: NEGATIVE
PLATELET # BLD AUTO: 451 10E3/UL (ref 150–450)
RBC # BLD AUTO: 3.69 10E6/UL (ref 3.8–5.2)
SPECIMEN TYPE: NORMAL
T PALLIDUM AB SER QL: NONREACTIVE
TSH SERPL DL<=0.005 MIU/L-ACNC: 1.94 UIU/ML (ref 0.3–4.2)
WBC # BLD AUTO: 7.8 10E3/UL (ref 4–11)

## 2025-01-28 PROCEDURE — 85027 COMPLETE CBC AUTOMATED: CPT | Performed by: ADVANCED PRACTICE MIDWIFE

## 2025-01-28 PROCEDURE — 87389 HIV-1 AG W/HIV-1&-2 AB AG IA: CPT | Performed by: ADVANCED PRACTICE MIDWIFE

## 2025-01-28 PROCEDURE — 86780 TREPONEMA PALLIDUM: CPT | Performed by: ADVANCED PRACTICE MIDWIFE

## 2025-01-28 PROCEDURE — 87591 N.GONORRHOEAE DNA AMP PROB: CPT | Performed by: ADVANCED PRACTICE MIDWIFE

## 2025-01-28 PROCEDURE — 36415 COLL VENOUS BLD VENIPUNCTURE: CPT | Performed by: ADVANCED PRACTICE MIDWIFE

## 2025-01-28 PROCEDURE — 87491 CHLMYD TRACH DNA AMP PROBE: CPT | Performed by: ADVANCED PRACTICE MIDWIFE

## 2025-01-28 PROCEDURE — 99204 OFFICE O/P NEW MOD 45 MIN: CPT | Performed by: ADVANCED PRACTICE MIDWIFE

## 2025-01-28 PROCEDURE — 84443 ASSAY THYROID STIM HORMONE: CPT | Performed by: ADVANCED PRACTICE MIDWIFE

## 2025-01-28 ASSESSMENT — ANXIETY QUESTIONNAIRES
1. FEELING NERVOUS, ANXIOUS, OR ON EDGE: NEARLY EVERY DAY
7. FEELING AFRAID AS IF SOMETHING AWFUL MIGHT HAPPEN: NEARLY EVERY DAY
GAD7 TOTAL SCORE: 19
7. FEELING AFRAID AS IF SOMETHING AWFUL MIGHT HAPPEN: NEARLY EVERY DAY
6. BECOMING EASILY ANNOYED OR IRRITABLE: NEARLY EVERY DAY
8. IF YOU CHECKED OFF ANY PROBLEMS, HOW DIFFICULT HAVE THESE MADE IT FOR YOU TO DO YOUR WORK, TAKE CARE OF THINGS AT HOME, OR GET ALONG WITH OTHER PEOPLE?: EXTREMELY DIFFICULT
4. TROUBLE RELAXING: NEARLY EVERY DAY
GAD7 TOTAL SCORE: 19
3. WORRYING TOO MUCH ABOUT DIFFERENT THINGS: NEARLY EVERY DAY
GAD7 TOTAL SCORE: 19
5. BEING SO RESTLESS THAT IT IS HARD TO SIT STILL: SEVERAL DAYS
2. NOT BEING ABLE TO STOP OR CONTROL WORRYING: NEARLY EVERY DAY
IF YOU CHECKED OFF ANY PROBLEMS ON THIS QUESTIONNAIRE, HOW DIFFICULT HAVE THESE PROBLEMS MADE IT FOR YOU TO DO YOUR WORK, TAKE CARE OF THINGS AT HOME, OR GET ALONG WITH OTHER PEOPLE: EXTREMELY DIFFICULT

## 2025-01-28 NOTE — PROGRESS NOTES
GYN Clinic Visit    Date of visit: 2025   Chief Complaint: Painful/heavy periods for many years    HPI:   Edelmira Laura is a 34 year old  female who presents to clinic today in consultation for painful/heavy periods    Menarche: age 14  Menses are irregular   Absent for 2 month(s) at the most   Lasting 3- 8 days.   Menses flow: can range from normal/light, and heavy.  Cramping that will start 1 week before her period.  Can be up to 8 out of 10 on the pain scale and wake her up at night.  Patient's last menstrual period was 2025.   She is not currently considering pregnancy.  Has had a tubal ligation    Ultrasound: Ordered today    Gynecologic History:  STI's: Agreeable to testing today  Last Pap: 2022  History of cervical procedures: No  Using tubal ligation for contraception.   The patient is sexually active with male partners.     Obstetric History:   OB History    Para Term  AB Living   3 3 1 2 0 3   SAB IAB Ectopic Multiple Live Births   0 0 0 0 3      # Outcome Date GA Lbr Burak/2nd Weight Sex Type Anes PTL Lv   3 Term 22 37w5d  2.76 kg (6 lb 1.4 oz) F CS-LTranv EPI N PORSHA      Name: ADAM,FEMALE-EDELMIRA      Apgar1: 8  Apgar5: 9   2  10/16/ 36w6d 02:07 / 00:18 2.41 kg (5 lb 5 oz) M Vag-Spont Other N PORSHA      Name: Xapaulon      Apgar1: 9  Apgar5: 9   1  07/05/10 36w2d  2.353 kg (5 lb 3 oz) M Vag-Spont Other  PORSHA      Name: Sadiq       Medications:  Current Outpatient Medications   Medication Sig Dispense Refill    HUMIRA *CF* PEN 40 MG/0.4ML pen kit       hydrOXYzine HCl (ATARAX) 10 MG tablet Take 1 tablet (10 mg) by mouth every 8 hours as needed for anxiety. 90 tablet 2    ibuprofen (ADVIL/MOTRIN) 600 MG tablet Take 1 tablet (600 mg) by mouth every 6 hours as needed for moderate pain. 90 tablet 3    naproxen (NAPROSYN) 500 MG tablet Take 1 tablet (500 mg) by mouth 2 times daily (with meals) 60 tablet 3    traZODone (DESYREL) 50 MG tablet Take 50 mg by  mouth nightly as needed      venlafaxine (EFFEXOR) 75 MG tablet Take 75 mg by mouth 3 times daily      VENTOLIN  (90 Base) MCG/ACT inhaler INHALE 1 TO 2 PUFFS INTO THE LUNGS EVERY 6 HOURS AS NEEDED FOR SHORTNESS OF BREATH OR DIFFICULT BREATHING OR WHEEZING 18 g 3    OLANZapine (ZYPREXA) 10 MG tablet Take 10 mg by mouth At Bedtime (Patient not taking: Reported on 2025)       No current facility-administered medications for this visit.       Allergy:  Allergies   Allergen Reactions    Penicillins Hives and Rash     itching      Sulfa Antibiotics Anaphylaxis       Past Medical History:  Past Medical History:   Diagnosis Date    Cholelithiases     Depressive disorder     Obesity        Past Surgical History:  Past Surgical History:   Procedure Laterality Date     SECTION N/A 2022    Procedure:  SECTION;  Surgeon: Get Bassett MD;  Location: [facility]    CHOLECYSTECTOMY      INSERT INTRAUTERINE DEVICE      TUBAL LIGATION      WISDOM TOOTH EXTRACTION         Family History   Problem Relation Age of Onset    Multiple Sclerosis Mother     Hypertension Maternal Grandmother     Diabetes Maternal Grandmother     Heart Disease Maternal Grandmother     Hypertension Paternal Grandmother      Denies hx of breast, ovarian, or colon cancer,     Review of Systems:  Gastrointestinal: negative  Genitourinary: positive for abnormal menstrual cycles, heavy bleeding, pain.  Otherwise negative  Musculoskeletal: negative  Neurologic: negative  Psychiatric: Positive for anxiety and depression  Endocrine: negative    Physical Exam:  Vitals:    25 1045   BP: 128/86   BP Location: Right arm   Patient Position: Sitting   Cuff Size: Adult Large   Pulse: 84   SpO2: 99%   Weight: 131.5 kg (290 lb)     Body mass index is 53.04 kg/m .    Gen: NAD, Awake and alert, cooperative with exam  Abd: soft, nontender, nondistended, no rebound, no guarding    PELVIC EXAM:  Deferred, pelvic ultrasound  ordered    Assessment:  Luis Antonio Laura is a 34 year old  who presents in consultation for heavy/painful periods    Plan:  1. Painful/heavy menstrual periods (Primary)  Reports that all of her pain is on her right side  -Pelvic ultrasound  -Agreeable to STI testing  -CBC to look for anemia due to heavy bleeding  -TSH  -Continue treatment with ibuprofen or naproxen for discomfort    Follow up: As clinically indicated by lab and imaging results. If all is normal recommendation to place a Mirena IUD as this has worked well for treatment in the past and Luis Antonio is agreeable to this.    Hannah Chau CNM       Answers submitted by the patient for this visit:  Patient Health Questionnaire (G7) (Submitted on 2025)  PONCHO 7 TOTAL SCORE: 19

## 2025-03-19 ENCOUNTER — HOSPITAL ENCOUNTER (EMERGENCY)
Facility: CLINIC | Age: 35
Discharge: HOME | End: 2025-03-19
Attending: EMERGENCY MEDICINE | Admitting: EMERGENCY MEDICINE
Payer: COMMERCIAL

## 2025-03-19 VITALS
WEIGHT: 293 LBS | OXYGEN SATURATION: 100 % | BODY MASS INDEX: 51.91 KG/M2 | TEMPERATURE: 97.9 F | DIASTOLIC BLOOD PRESSURE: 80 MMHG | HEART RATE: 106 BPM | RESPIRATION RATE: 22 BRPM | SYSTOLIC BLOOD PRESSURE: 126 MMHG | HEIGHT: 63 IN

## 2025-03-19 DIAGNOSIS — K57.92 DIVERTICULITIS: ICD-10-CM

## 2025-03-19 LAB
ALBUMIN SERPL BCG-MCNC: 3.5 G/DL (ref 3.5–5.2)
ALBUMIN UR-MCNC: NEGATIVE MG/DL
ALP SERPL-CCNC: 83 U/L (ref 40–150)
ALT SERPL W P-5'-P-CCNC: 11 U/L (ref 0–50)
ANION GAP SERPL CALCULATED.3IONS-SCNC: 8 MMOL/L (ref 7–15)
APPEARANCE UR: CLEAR
AST SERPL W P-5'-P-CCNC: 18 U/L (ref 0–45)
BASOPHILS # BLD AUTO: 0 10E3/UL (ref 0–0.2)
BASOPHILS NFR BLD AUTO: 0 %
BILIRUB DIRECT SERPL-MCNC: 0.17 MG/DL (ref 0–0.3)
BILIRUB SERPL-MCNC: 0.4 MG/DL
BILIRUB UR QL STRIP: NEGATIVE
BUN SERPL-MCNC: 9 MG/DL (ref 6–20)
CALCIUM SERPL-MCNC: 8.9 MG/DL (ref 8.8–10.4)
CHLORIDE SERPL-SCNC: 103 MMOL/L (ref 98–107)
COLOR UR AUTO: ABNORMAL
CREAT SERPL-MCNC: 0.59 MG/DL (ref 0.51–0.95)
EGFRCR SERPLBLD CKD-EPI 2021: >90 ML/MIN/1.73M2
EOSINOPHIL # BLD AUTO: 0.3 10E3/UL (ref 0–0.7)
EOSINOPHIL NFR BLD AUTO: 3 %
ERYTHROCYTE [DISTWIDTH] IN BLOOD BY AUTOMATED COUNT: 14.5 % (ref 10–15)
FLUAV RNA SPEC QL NAA+PROBE: NEGATIVE
FLUBV RNA RESP QL NAA+PROBE: NEGATIVE
GLUCOSE SERPL-MCNC: 116 MG/DL (ref 70–99)
GLUCOSE UR STRIP-MCNC: NEGATIVE MG/DL
HCG UR QL: NEGATIVE
HCO3 SERPL-SCNC: 23 MMOL/L (ref 22–29)
HCT VFR BLD AUTO: 32.2 % (ref 35–47)
HGB BLD-MCNC: 10.3 G/DL (ref 11.7–15.7)
HGB UR QL STRIP: ABNORMAL
IMM GRANULOCYTES # BLD: 0.1 10E3/UL
IMM GRANULOCYTES NFR BLD: 1 %
KETONES UR STRIP-MCNC: ABNORMAL MG/DL
LEUKOCYTE ESTERASE UR QL STRIP: NEGATIVE
LIPASE SERPL-CCNC: 28 U/L (ref 13–60)
LYMPHOCYTES # BLD AUTO: 2.6 10E3/UL (ref 0.8–5.3)
LYMPHOCYTES NFR BLD AUTO: 24 %
MCH RBC QN AUTO: 26.1 PG (ref 26.5–33)
MCHC RBC AUTO-ENTMCNC: 32 G/DL (ref 31.5–36.5)
MCV RBC AUTO: 82 FL (ref 78–100)
MONOCYTES # BLD AUTO: 0.8 10E3/UL (ref 0–1.3)
MONOCYTES NFR BLD AUTO: 7 %
MUCOUS THREADS #/AREA URNS LPF: PRESENT /LPF
NEUTROPHILS # BLD AUTO: 7.1 10E3/UL (ref 1.6–8.3)
NEUTROPHILS NFR BLD AUTO: 65 %
NITRATE UR QL: NEGATIVE
NRBC # BLD AUTO: 0 10E3/UL
NRBC BLD AUTO-RTO: 0 /100
PH UR STRIP: 6 [PH] (ref 5–7)
PLATELET # BLD AUTO: 423 10E3/UL (ref 150–450)
POTASSIUM SERPL-SCNC: 4 MMOL/L (ref 3.4–5.3)
PROT SERPL-MCNC: 8.4 G/DL (ref 6.4–8.3)
RBC # BLD AUTO: 3.94 10E6/UL (ref 3.8–5.2)
RBC URINE: <1 /HPF
RSV RNA SPEC NAA+PROBE: NEGATIVE
SARS-COV-2 RNA RESP QL NAA+PROBE: NEGATIVE
SODIUM SERPL-SCNC: 134 MMOL/L (ref 135–145)
SP GR UR STRIP: 1.02 (ref 1–1.03)
SQUAMOUS EPITHELIAL: 3 /HPF
UROBILINOGEN UR STRIP-MCNC: <2 MG/DL
WBC # BLD AUTO: 10.9 10E3/UL (ref 4–11)
WBC URINE: 2 /HPF

## 2025-03-19 PROCEDURE — 96361 HYDRATE IV INFUSION ADD-ON: CPT | Performed by: EMERGENCY MEDICINE

## 2025-03-19 PROCEDURE — 87637 SARSCOV2&INF A&B&RSV AMP PRB: CPT

## 2025-03-19 PROCEDURE — 258N000003 HC RX IP 258 OP 636

## 2025-03-19 PROCEDURE — 83690 ASSAY OF LIPASE: CPT

## 2025-03-19 PROCEDURE — 80053 COMPREHEN METABOLIC PANEL: CPT

## 2025-03-19 PROCEDURE — 99285 EMERGENCY DEPT VISIT HI MDM: CPT | Mod: 25 | Performed by: EMERGENCY MEDICINE

## 2025-03-19 PROCEDURE — 36415 COLL VENOUS BLD VENIPUNCTURE: CPT

## 2025-03-19 PROCEDURE — 85025 COMPLETE CBC W/AUTO DIFF WBC: CPT

## 2025-03-19 PROCEDURE — 250N000011 HC RX IP 250 OP 636

## 2025-03-19 PROCEDURE — 96374 THER/PROPH/DIAG INJ IV PUSH: CPT | Mod: 59 | Performed by: EMERGENCY MEDICINE

## 2025-03-19 PROCEDURE — 96375 TX/PRO/DX INJ NEW DRUG ADDON: CPT | Mod: 59 | Performed by: EMERGENCY MEDICINE

## 2025-03-19 PROCEDURE — 82248 BILIRUBIN DIRECT: CPT

## 2025-03-19 PROCEDURE — 250N000011 HC RX IP 250 OP 636: Mod: JZ

## 2025-03-19 RX ORDER — ONDANSETRON 2 MG/ML
4 INJECTION INTRAMUSCULAR; INTRAVENOUS ONCE
Status: COMPLETED | OUTPATIENT
Start: 2025-03-19 | End: 2025-03-19

## 2025-03-19 RX ORDER — IOPAMIDOL 755 MG/ML
90 INJECTION, SOLUTION INTRAVASCULAR ONCE
Status: COMPLETED | OUTPATIENT
Start: 2025-03-19 | End: 2025-03-19

## 2025-03-19 RX ORDER — METRONIDAZOLE 500 MG/1
500 TABLET ORAL EVERY 8 HOURS
Qty: 15 TABLET | Refills: 0 | Status: SHIPPED | OUTPATIENT
Start: 2025-03-19 | End: 2025-03-24

## 2025-03-19 RX ORDER — HYDROMORPHONE HYDROCHLORIDE 1 MG/ML
0.5 INJECTION, SOLUTION INTRAMUSCULAR; INTRAVENOUS; SUBCUTANEOUS ONCE
Status: COMPLETED | OUTPATIENT
Start: 2025-03-19 | End: 2025-03-19

## 2025-03-19 RX ORDER — CIPROFLOXACIN 500 MG/1
500 TABLET, FILM COATED ORAL 2 TIMES DAILY
Qty: 10 TABLET | Refills: 0 | Status: SHIPPED | OUTPATIENT
Start: 2025-03-19 | End: 2025-03-24

## 2025-03-19 RX ADMIN — HYDROMORPHONE HYDROCHLORIDE 0.5 MG: 1 INJECTION, SOLUTION INTRAMUSCULAR; INTRAVENOUS; SUBCUTANEOUS at 12:41

## 2025-03-19 RX ADMIN — SODIUM CHLORIDE 1000 ML: 0.9 INJECTION, SOLUTION INTRAVENOUS at 12:49

## 2025-03-19 RX ADMIN — ONDANSETRON 4 MG: 2 INJECTION, SOLUTION INTRAMUSCULAR; INTRAVENOUS at 12:52

## 2025-03-19 RX ADMIN — IOPAMIDOL 90 ML: 755 INJECTION, SOLUTION INTRAVENOUS at 14:34

## 2025-03-19 ASSESSMENT — COLUMBIA-SUICIDE SEVERITY RATING SCALE - C-SSRS
2. HAVE YOU ACTUALLY HAD ANY THOUGHTS OF KILLING YOURSELF IN THE PAST MONTH?: NO
1. IN THE PAST MONTH, HAVE YOU WISHED YOU WERE DEAD OR WISHED YOU COULD GO TO SLEEP AND NOT WAKE UP?: NO
6. HAVE YOU EVER DONE ANYTHING, STARTED TO DO ANYTHING, OR PREPARED TO DO ANYTHING TO END YOUR LIFE?: NO

## 2025-03-19 ASSESSMENT — ACTIVITIES OF DAILY LIVING (ADL)
ADLS_ACUITY_SCORE: 52

## 2025-03-19 NOTE — DISCHARGE INSTRUCTIONS
Your emergency department evaluation today is reassuring.  We found evidence of diverticulitis on your CT, and have sent a prescription for 2 antibiotics to your pharmacy.  Please take the full course of both of these antibiotics, and return to either the ED or emergency room for reevaluation in about 2 days.  If you have any significant increase in your pain, new fever, or any other concerning new symptoms, please return to the emergency department.  You can take Tylenol or ibuprofen as needed for pain.

## 2025-03-19 NOTE — ED NOTES
Discharge paperwork discussed with pt. Questions were answered to patient satisfaction. Isatu Negrete RN 3/19/2025 3:28 PM

## 2025-03-19 NOTE — ED PROVIDER NOTES
"Emergency Department Midlevel Supervisory Note     I had a face to face encounter with this patient seen by the Advanced Practice Provider (JORGE). I personally made/approved the management plan and take responsibility for the patient management. I personally saw patient and performed a substantive portion of the visit including all aspects of the medical decision making.     ED Course:  12:19 PM Jennifer Beckham PA-C staffed patient with me. I agree with their assessment and plan of management, and I will see the patient.   I met with the patient to introduce myself, gather additional history, perform my initial exam, and discuss the plan.    Brief HPI:     Luis Antonio Laura is a 34 year old female with PMHx of s/p cholecystectomy, who presents for evaluation of abdominal pain. She's been experiencing LLQ abdominal pain ongoing for the past 3 days. The pain is internment. She describes the pain as dull and sharp. The pain has been progressively worsening, but it got significantly worse today. Her last BM was 2 days ago. She's been having small, hard stools. She denies any fevers, vomiting, chest pain, back pain, dysuria or hematuria. No history of ovarian cysts, per chart review. Denies any chance of pregnancy.    I, Kori Figueroa, am serving as a scribe to document services personally performed by Dr. Umair Sales based on my observation and the provider's statements to me. I, Umair Sales MD attest that Kori Figueroa is acting in a scribe capacity, has observed my performance of the services and has documented them in accordance with my direction.    Brief Physical Exam: BP (!) 146/90   Pulse 106   Temp 97.9  F (36.6  C) (Oral)   Resp 22   Ht 1.6 m (5' 3\")   Wt 132.9 kg (293 lb)   LMP  (LMP Unknown)   SpO2 100%   BMI 51.90 kg/m    Constitutional:  Alert, in no acute distress  EYES: Conjunctivae clear  HENT:  Atraumatic  Respiratory:  Respirations even, unlabored, in no acute respiratory distress  Cardiovascular: "  Regular rate and rhythm, good peripheral perfusion  GI: Left lower quadrant tender  Musculoskeletal:  Moves all 4 extremities equally, grossly symmetrical strength  Integument: Warm & dry. No appreciable rash, erythema.  Neurologic:  Alert & oriented, speech clear and fluent, no focal deficits noted  Psych: Normal mood and affect       MDM:  Patient evaluated for abdominal pain.  She is mildly uncomfortable.  Vital signs show tachycardia.  There is left lower quadrant tenderness.  She was treated for pain.  Labs are negative.  CT shows uncomplicated diverticulitis.  Patient stable for discharge home with antibiotics       No diagnosis found.      Labs and Imaging:  Results for orders placed or performed during the hospital encounter of 03/19/25   CT Abdomen Pelvis w Contrast    Impression    IMPRESSION:     Acute, uncomplicated diverticulitis/colitis of the short segment of the colon in the left lower quadrant.   UA with Microscopic reflex to Culture    Specimen: Urine, Midstream   Result Value Ref Range    Color Urine Light Yellow Colorless, Straw, Light Yellow, Yellow    Appearance Urine Clear Clear    Glucose Urine Negative Negative mg/dL    Bilirubin Urine Negative Negative    Ketones Urine Trace (A) Negative mg/dL    Specific Gravity Urine 1.023 1.001 - 1.030    Blood Urine 0.06 mg/dL (A) Negative    pH Urine 6.0 5.0 - 7.0    Protein Albumin Urine Negative Negative mg/dL    Urobilinogen Urine <2.0 <2.0 mg/dL    Nitrite Urine Negative Negative    Leukocyte Esterase Urine Negative Negative    Mucus Urine Present (A) None Seen /LPF    RBC Urine <1 <=2 /HPF    WBC Urine 2 <=5 /HPF    Squamous Epithelials Urine 3 (H) <=1 /HPF   HCG qualitative urine   Result Value Ref Range    hCG Urine Qualitative Negative Negative   Basic metabolic panel   Result Value Ref Range    Sodium 134 (L) 135 - 145 mmol/L    Potassium 4.0 3.4 - 5.3 mmol/L    Chloride 103 98 - 107 mmol/L    Carbon Dioxide (CO2) 23 22 - 29 mmol/L    Anion  Gap 8 7 - 15 mmol/L    Urea Nitrogen 9.0 6.0 - 20.0 mg/dL    Creatinine 0.59 0.51 - 0.95 mg/dL    GFR Estimate >90 >60 mL/min/1.73m2    Calcium 8.9 8.8 - 10.4 mg/dL    Glucose 116 (H) 70 - 99 mg/dL   Hepatic function panel   Result Value Ref Range    Protein Total 8.4 (H) 6.4 - 8.3 g/dL    Albumin 3.5 3.5 - 5.2 g/dL    Bilirubin Total 0.4 <=1.2 mg/dL    Alkaline Phosphatase 83 40 - 150 U/L    AST 18 0 - 45 U/L    ALT 11 0 - 50 U/L    Bilirubin Direct 0.17 0.00 - 0.30 mg/dL   Result Value Ref Range    Lipase 28 13 - 60 U/L   Influenza A/B, RSV and SARS-CoV2 PCR (COVID-19) Nose    Specimen: Nose; Swab   Result Value Ref Range    Influenza A PCR Negative Negative    Influenza B PCR Negative Negative    RSV PCR Negative Negative    SARS CoV2 PCR Negative Negative   CBC with platelets and differential   Result Value Ref Range    WBC Count 10.9 4.0 - 11.0 10e3/uL    RBC Count 3.94 3.80 - 5.20 10e6/uL    Hemoglobin 10.3 (L) 11.7 - 15.7 g/dL    Hematocrit 32.2 (L) 35.0 - 47.0 %    MCV 82 78 - 100 fL    MCH 26.1 (L) 26.5 - 33.0 pg    MCHC 32.0 31.5 - 36.5 g/dL    RDW 14.5 10.0 - 15.0 %    Platelet Count 423 150 - 450 10e3/uL    % Neutrophils 65 %    % Lymphocytes 24 %    % Monocytes 7 %    % Eosinophils 3 %    % Basophils 0 %    % Immature Granulocytes 1 %    NRBCs per 100 WBC 0 <1 /100    Absolute Neutrophils 7.1 1.6 - 8.3 10e3/uL    Absolute Lymphocytes 2.6 0.8 - 5.3 10e3/uL    Absolute Monocytes 0.8 0.0 - 1.3 10e3/uL    Absolute Eosinophils 0.3 0.0 - 0.7 10e3/uL    Absolute Basophils 0.0 0.0 - 0.2 10e3/uL    Absolute Immature Granulocytes 0.1 <=0.4 10e3/uL    Absolute NRBCs 0.0 10e3/uL       I have reviewed the relevant laboratory studies above.      EKG: I reviewed and independently interpreted the patient's EKG, with comments made as listed below. Please see scanned EKG for full report.     Procedures:  I was present for the key portions of procedures documented in JORGE/midlevel note, see midlevel note for further  details.    Umair Sales M.D.  [facility] EMERGENCY ROOM  [address] [id number] [phone]      Umair Sales MD  03/19/25 [id number]

## 2025-03-19 NOTE — ED PROVIDER NOTES
Emergency Department Encounter   NAME: Luis Antonio Laura ; AGE: 34 year old female ; YOB: 1990 ; MRN: [id number] ; PCP: Medina Melton   ED PROVIDER: Jennifer Beckham PA-C    Evaluation Date & Time:   3/19/2025 12:11 PM    CHIEF COMPLAINT:  Abdominal Pain      Impression and Plan   MDM: Luis Antonio Laura is a 34 year old female with a pertinent history of tubal ligation, c section, dysmenorrhea, morbid obesity who presents to the ED for evaluation of LLQ abdominal pain. She began to have pain in her left lower quadrant and left back about 3 days ago. It has since gotten more intense and constant. She describes it as episodically worse, sometimes dull, and sometimes sharp. She has taken tylenol and ibuprofen for the pain, but this has not helped. Her last normal bowel movement was about 2 days ago, and since then she has only been able to have small, hard bowel movements. She denies any blood in her stool or any black, tarry bowel movements. She has taken miralax for this, but it has not helped. She denies any vomiting or fever but had diarrhea on the first day. She denies any dysuria, hematuria, vaginal discharge or bleeding, or current flank pain. She has had her fallopian tubes removed but still has both ovaries and her uterus.      Vitals reviewed and patient is afebrile but mildly tachycardic. On exam patient has significant tenderness to palpation of her LLQ and appears to be in acute pain.   Differential diagnosis/emergent conditions considered and evaluated for includes but not limited to ovarian cyst/torsion, constipation/SBO, diverticulitis, UTI/pyelonephritis, nephrolithiasis, viral illness, ectopic pregnancy.    CBC shows no sign of leukocytosis, and patient is at her baseline for anemia with a hemoglobin of 10.3.  BMP shows no electrolyte abnormality or increase in creatinine that could indicate hydronephrosis from nephrolithiasis.  UA does not show any infection or blood in her urine to  indicate pyelonephritis/nephrolithiasis.  Lipase normal, no pancreatitis.  Viral panel negative.  CT abdomen pelvis ordered for evaluation of possible SBO, and it shows evidence of diverticulitis.  Signs of ovarian abnormalities on imaging to indicate torsion, and no kidney stone seen.  Patient symptoms can easily be explained by this diverticulitis, and we discussed that she will need to be started on antibiotics for this condition.  She does have a penicillin allergy, so I sent a prescription of Flagyl and ciprofloxacin to her pharmacy.  We instructed her to return to the emergency department or urgent care to be reevaluated in about 2 days to ensure proper and thorough treatment of her diverticulitis.  In the meantime she can take Tylenol or ibuprofen as needed for pain.  Pain medications given today in the ER have significantly helped her pain, and she is at a 2/10 at the time of discharge.      ED COURSE:  12:26 PM I met and introduced myself to the patient. I gathered initial history and performed my physical exam. We discussed plan for initial workup.       12:34 PM I have staffed the patient with Dr. Sales, ED MD, who has evaluated the patient and agrees with all aspects of today's care.   3:18 PM I rechecked the patient and discussed results, discharge, follow up, and reasons to return to the ED.     At the conclusion of the encounter I discussed the results of all the tests and the disposition. The questions were answered. The patient or family acknowledged understanding and was agreeable with the care plan.    Medical Decision Making  Obtained supplemental history:Supplemental history obtained?: no  Reviewed external records: External records reviewed?: No  Care impacted by chronic illness:Smoking / Nicotine Use  Did you consider but not order tests?: Work up considered but not performed and documented in chart, if applicable  Did you interpret images independently?: Independent interpretation of ECG and  images noted in documentation, when applicable.  Consultation discussion with other provider:Did you involve another provider (consultant, MH, pharmacy, etc.)?: No  Discharge. I prescribed additional prescription strength medication(s) as charted. See documentation for any additional details.    MIPS: Not Applicable    FINAL IMPRESSION:    ICD-10-CM    1. Diverticulitis  K57.92             MEDICATIONS GIVEN IN THE EMERGENCY DEPARTMENT:  Medications   HYDROmorphone (PF) (DILAUDID) injection 0.5 mg (0.5 mg Intravenous $Given 3/19/25 1241)   sodium chloride 0.9% BOLUS 1,000 mL (0 mLs Intravenous Stopped 3/19/25 1420)   ondansetron (ZOFRAN) injection 4 mg (4 mg Intravenous $Given 3/19/25 1252)   iopamidol (ISOVUE-370) solution 90 mL (90 mLs Intravenous $Given 3/19/25 1434)         NEW PRESCRIPTIONS STARTED AT TODAY'S ED VISIT:  New Prescriptions    CIPROFLOXACIN (CIPRO) 500 MG TABLET    Take 1 tablet (500 mg) by mouth 2 times daily for 5 days.    METRONIDAZOLE (FLAGYL) 500 MG TABLET    Take 1 tablet (500 mg) by mouth every 8 hours for 5 days.         HPI   Use of Intrepreter: N/A     Mary Loulillie JOEL Valentín is a 34 year old female with a pertinent history of tubal ligation, c section, dysmenorrhea, morbid obesity who presents to the ED for evaluation of LLQ abdominal pain. She began to have pain in her left lower quadrant and left back about 3 days ago. It has since gotten more intense and constant. She describes it as episodically worse, sometimes dull, and sometimes sharp. She has taken tylenol and ibuprofen for the pain, but this has not helped. Her last normal bowel movement was about 2 days ago, and since then she has only been able to have small, hard bowel movements. She has taken miralax for this, but it has not helped. She denies any vomiting or fever but had diarrhea on the first day. She denies any dysuria, hematuria, vaginal discharge or bleeding, or current flank pain. She has had her fallopian tubes removed but  still has both arteries and her uterus.     She denies any chest pain, cough, SOB, weakness.      REVIEW OF SYSTEMS:  Pertinent positive and negative symptoms per HPI.       Medical History     Past Medical History:   Diagnosis Date    Cholelithiases     Depressive disorder     Obesity        Past Surgical History:   Procedure Laterality Date     SECTION N/A 2022    Procedure:  SECTION;  Surgeon: Get Bassett MD;  Location: [facility]    CHOLECYSTECTOMY      INSERT INTRAUTERINE DEVICE      TUBAL LIGATION      WISDOM TOOTH EXTRACTION         Family History   Problem Relation Age of Onset    Multiple Sclerosis Mother     Hypertension Maternal Grandmother     Diabetes Maternal Grandmother     Heart Disease Maternal Grandmother     Hypertension Paternal Grandmother        Social History     Tobacco Use    Smoking status: Former     Types: Vaping Device     Quit date: 9/10/2020     Years since quittin.5    Smokeless tobacco: Never   Vaping Use    Vaping status: Former   Substance Use Topics    Alcohol use: Yes     Alcohol/week: 14.0 standard drinks of alcohol     Comment: prior to pregnacy maybe 2-3 times a week    Drug use: Not Currently     Types: Marijuana     Comment: stopped a few months prior to get pregnancy       ciprofloxacin (CIPRO) 500 MG tablet  metroNIDAZOLE (FLAGYL) 500 MG tablet  HUMIRA *CF* PEN 40 MG/0.4ML pen kit  hydrOXYzine HCl (ATARAX) 10 MG tablet  ibuprofen (ADVIL/MOTRIN) 600 MG tablet  naproxen (NAPROSYN) 500 MG tablet  OLANZapine (ZYPREXA) 10 MG tablet  traZODone (DESYREL) 50 MG tablet  venlafaxine (EFFEXOR) 75 MG tablet  VENTOLIN  (90 Base) MCG/ACT inhaler          Physical Exam     First Vitals:  Patient Vitals for the past 24 hrs:   BP Temp Temp src Pulse Resp SpO2 Height Weight   25 1414 126/80 -- -- -- -- -- -- --   25 1359 131/81 -- -- -- -- -- -- --   25 1344 (!) 148/87 -- -- -- -- -- -- --   25 1333 (!) 146/90 -- --  "-- -- -- -- --   03/19/25 1320 138/83 -- -- -- -- -- -- --   03/19/25 1305 132/84 -- -- -- -- -- -- --   03/19/25 1210 -- -- -- -- -- -- 1.6 m (5' 3\") 132.9 kg (293 lb)   03/19/25 1209 137/87 -- -- 106 22 100 % -- --   03/19/25 1208 -- 97.9  F (36.6  C) Oral -- -- -- -- --         PHYSICAL EXAM:   Physical Exam  Constitutional:       General: She is not in acute distress.     Appearance: Normal appearance. She is not diaphoretic.   HENT:      Head: Atraumatic.      Mouth/Throat:      Mouth: Mucous membranes are moist.   Eyes:      General: No scleral icterus.     Conjunctiva/sclera: Conjunctivae normal.   Cardiovascular:      Rate and Rhythm: Normal rate.      Heart sounds: Normal heart sounds.   Pulmonary:      Effort: No respiratory distress.      Breath sounds: Normal breath sounds.   Abdominal:      General: Abdomen is flat.      Tenderness: There is abdominal tenderness (LLQ tenderness to palpation. Some LUQ tenderness). There is no right CVA tenderness or left CVA tenderness.      Comments: Bowel sounds hypoactive   Musculoskeletal:         General: Normal range of motion.      Cervical back: Neck supple.   Skin:     General: Skin is warm.      Findings: No rash.   Neurological:      General: No focal deficit present.      Mental Status: She is alert and oriented to person, place, and time.             Results     LAB:  All pertinent labs reviewed and interpreted  Labs Ordered and Resulted from Time of ED Arrival to Time of ED Departure   ROUTINE UA WITH MICROSCOPIC REFLEX TO CULTURE - Abnormal       Result Value    Color Urine Light Yellow      Appearance Urine Clear      Glucose Urine Negative      Bilirubin Urine Negative      Ketones Urine Trace (*)     Specific Gravity Urine 1.023      Blood Urine 0.06 mg/dL (*)     pH Urine 6.0      Protein Albumin Urine Negative      Urobilinogen Urine <2.0      Nitrite Urine Negative      Leukocyte Esterase Urine Negative      Mucus Urine Present (*)     RBC Urine <1   "    WBC Urine 2      Squamous Epithelials Urine 3 (*)    BASIC METABOLIC PANEL - Abnormal    Sodium 134 (*)     Potassium 4.0      Chloride 103      Carbon Dioxide (CO2) 23      Anion Gap 8      Urea Nitrogen 9.0      Creatinine 0.59      GFR Estimate >90      Calcium 8.9      Glucose 116 (*)    HEPATIC FUNCTION PANEL - Abnormal    Protein Total 8.4 (*)     Albumin 3.5      Bilirubin Total 0.4      Alkaline Phosphatase 83      AST 18      ALT 11      Bilirubin Direct 0.17     CBC WITH PLATELETS AND DIFFERENTIAL - Abnormal    WBC Count 10.9      RBC Count 3.94      Hemoglobin 10.3 (*)     Hematocrit 32.2 (*)     MCV 82      MCH 26.1 (*)     MCHC 32.0      RDW 14.5      Platelet Count 423      % Neutrophils 65      % Lymphocytes 24      % Monocytes 7      % Eosinophils 3      % Basophils 0      % Immature Granulocytes 1      NRBCs per 100 WBC 0      Absolute Neutrophils 7.1      Absolute Lymphocytes 2.6      Absolute Monocytes 0.8      Absolute Eosinophils 0.3      Absolute Basophils 0.0      Absolute Immature Granulocytes 0.1      Absolute NRBCs 0.0     HCG QUALITATIVE URINE - Normal    hCG Urine Qualitative Negative     LIPASE - Normal    Lipase 28     INFLUENZA A/B, RSV AND SARS-COV2 PCR - Normal    Influenza A PCR Negative      Influenza B PCR Negative      RSV PCR Negative      SARS CoV2 PCR Negative         RADIOLOGY:  CT Abdomen Pelvis w Contrast   Final Result   IMPRESSION:       Acute, uncomplicated diverticulitis/colitis of the short segment of the colon in the left lower quadrant.              PROCEDURES:  none        Jennifer Beckham PA-C   Emergency Medicine   [facility] EMERGENCY ROOM        Jennifer Beckham PA-C  03/19/25 2995

## 2025-03-20 ENCOUNTER — PATIENT OUTREACH (OUTPATIENT)
Dept: FAMILY MEDICINE | Facility: CLINIC | Age: 35
End: 2025-03-20
Payer: COMMERCIAL

## 2025-03-20 NOTE — TELEPHONE ENCOUNTER
Transitions of Care Outreach  Chief Complaint   Patient presents with    Hospital F/U       Most Recent Admission Date: 3/19/2025   Most Recent Admission Diagnosis:      Most Recent Discharge Date: 3/19/2025   Most Recent Discharge Diagnosis: Diverticulitis - K57.92     Transitions of Care Assessment    Discharge Assessment  How are you doing now that you are home?: still having pain  How are your symptoms? (Red Flag symptoms escalate to triage hotline per guidelines): Improved  Do you know how to contact your clinic care team if you have future questions or changes to your health status? : Yes  Does the patient have their discharge instructions? : Yes  Does the patient have questions regarding their discharge instructions? : No  Were you started on any new medications or were there changes to any of your previous medications? : Yes  Does the patient have all of their medications?: Yes  Do you have questions regarding any of your medications? : No  Do you have all of your needed medical supplies or equipment (DME)?  (i.e. oxygen tank, CPAP, cane, etc.): Yes    Follow up Plan     Discharge Follow-Up  Discharge follow up appointment scheduled in alignment with recommended follow up timeframe or Transitions of Risk Category? (Low = within 30 days; Moderate= within 14 days; High= within 7 days): No  Patient's follow up appointment not scheduled: Patient declined scheduling support. Education on the importance of transitions of care follow up. Provided scheduling phone number.    No future appointments.    Outpatient Plan as outlined on AVS reviewed with patient.    For any urgent concerns, please contact our 24 hour nurse triage line: [phone] ([phone])       Amish Jacques RN

## 2025-04-21 ENCOUNTER — E-VISIT (OUTPATIENT)
Dept: URGENT CARE | Facility: CLINIC | Age: 35
End: 2025-04-21
Payer: COMMERCIAL

## 2025-04-21 DIAGNOSIS — L30.9 DERMATITIS: Primary | ICD-10-CM

## 2025-04-21 PROCEDURE — 99207 PR NON-BILLABLE SERV PER CHARTING: CPT | Performed by: NURSE PRACTITIONER

## 2025-04-21 RX ORDER — TRIAMCINOLONE ACETONIDE 0.25 MG/G
OINTMENT TOPICAL 2 TIMES DAILY
Qty: 80 G | Refills: 1 | Status: SHIPPED | OUTPATIENT
Start: 2025-04-21

## 2025-04-21 NOTE — PATIENT INSTRUCTIONS
Dear Luis Antonio Laura    After reviewing your responses, I've been able to diagnose you with dermatitis, which is a common skin condition that causes small fluid-filled blisters or bumps to appear on your skin. The exact cause is unknown but your risk may increase if you have allergies, smoke, or have other skin conditions. Some foods such as mushrooms, chocolate and coffee also are known potential triggers.     Based on your responses, I have prescribed triamcinolone to treat this. Please follow the instructions on the medication. If you experience irritation of your skin, new rash, or any other new symptoms, you should stop using this medication and contact your primary care provider.     If this treatment does not work for you or you will run out of refills, please plan to follow- up with your primary care provider to set refills for a longer period of time or to try other options.     Things you can do to help prevent this:     Do not scratch your rash.Bacteria from your fingernails may enter your open sores during scratching and cause an infection.     Use moisturizes or emollients, such as petroleum jelly.These help relieve itching and help prevent bacteria from getting in your sores. If you have a doctor's order for medicated cream, apply that first. Then apply the moisturizer or emollient on top.    Thanks for choosing us as your health care partner,    BRYN WILKINS CNP

## 2025-05-06 ENCOUNTER — HOSPITAL ENCOUNTER (EMERGENCY)
Facility: CLINIC | Age: 35
Discharge: HOME | End: 2025-05-06
Attending: EMERGENCY MEDICINE | Admitting: EMERGENCY MEDICINE
Payer: COMMERCIAL

## 2025-05-06 ENCOUNTER — ANCILLARY PROCEDURE (OUTPATIENT)
Dept: ULTRASOUND IMAGING | Facility: CLINIC | Age: 35
End: 2025-05-06
Attending: EMERGENCY MEDICINE
Payer: COMMERCIAL

## 2025-05-06 VITALS
BODY MASS INDEX: 49.13 KG/M2 | DIASTOLIC BLOOD PRESSURE: 101 MMHG | OXYGEN SATURATION: 97 % | TEMPERATURE: 98.4 F | HEART RATE: 93 BPM | WEIGHT: 267 LBS | RESPIRATION RATE: 18 BRPM | HEIGHT: 62 IN | SYSTOLIC BLOOD PRESSURE: 162 MMHG

## 2025-05-06 DIAGNOSIS — M25.522 LEFT ELBOW PAIN: ICD-10-CM

## 2025-05-06 DIAGNOSIS — J18.9 COMMUNITY ACQUIRED PNEUMONIA, UNSPECIFIED LATERALITY: ICD-10-CM

## 2025-05-06 PROCEDURE — 250N000013 HC RX MED GY IP 250 OP 250 PS 637: Performed by: EMERGENCY MEDICINE

## 2025-05-06 PROCEDURE — 250N000012 HC RX MED GY IP 250 OP 636 PS 637: Performed by: EMERGENCY MEDICINE

## 2025-05-06 PROCEDURE — 99284 EMERGENCY DEPT VISIT MOD MDM: CPT | Mod: 25

## 2025-05-06 PROCEDURE — 76882 US LMTD JT/FCL EVL NVASC XTR: CPT | Mod: LT

## 2025-05-06 RX ORDER — OXYCODONE HYDROCHLORIDE 5 MG/1
5 TABLET ORAL EVERY 6 HOURS PRN
Qty: 6 TABLET | Refills: 0 | Status: SHIPPED | OUTPATIENT
Start: 2025-05-06 | End: 2025-05-09

## 2025-05-06 RX ORDER — OXYCODONE HYDROCHLORIDE 5 MG/1
5 TABLET ORAL ONCE
Status: COMPLETED | OUTPATIENT
Start: 2025-05-06 | End: 2025-05-06

## 2025-05-06 RX ORDER — PREDNISONE 20 MG/1
40 TABLET ORAL ONCE
Status: COMPLETED | OUTPATIENT
Start: 2025-05-06 | End: 2025-05-06

## 2025-05-06 RX ORDER — ACETAMINOPHEN 500 MG
1000 TABLET ORAL 3 TIMES DAILY
Qty: 30 TABLET | Refills: 0 | Status: SHIPPED | OUTPATIENT
Start: 2025-05-06

## 2025-05-06 RX ORDER — IBUPROFEN 600 MG/1
600 TABLET, FILM COATED ORAL EVERY 6 HOURS PRN
Qty: 30 TABLET | Refills: 0 | Status: SHIPPED | OUTPATIENT
Start: 2025-05-06

## 2025-05-06 RX ORDER — AZITHROMYCIN 250 MG/1
TABLET, FILM COATED ORAL
Qty: 6 TABLET | Refills: 0 | Status: SHIPPED | OUTPATIENT
Start: 2025-05-06 | End: 2025-05-11

## 2025-05-06 RX ORDER — PREDNISONE 20 MG/1
40 TABLET ORAL DAILY
Qty: 8 TABLET | Refills: 0 | Status: SHIPPED | OUTPATIENT
Start: 2025-05-06 | End: 2025-05-10

## 2025-05-06 RX ADMIN — PREDNISONE 40 MG: 20 TABLET ORAL at 10:51

## 2025-05-06 RX ADMIN — OXYCODONE 5 MG: 5 TABLET ORAL at 10:51

## 2025-05-06 ASSESSMENT — COLUMBIA-SUICIDE SEVERITY RATING SCALE - C-SSRS
1. IN THE PAST MONTH, HAVE YOU WISHED YOU WERE DEAD OR WISHED YOU COULD GO TO SLEEP AND NOT WAKE UP?: NO
6. HAVE YOU EVER DONE ANYTHING, STARTED TO DO ANYTHING, OR PREPARED TO DO ANYTHING TO END YOUR LIFE?: NO
2. HAVE YOU ACTUALLY HAD ANY THOUGHTS OF KILLING YOURSELF IN THE PAST MONTH?: NO

## 2025-05-06 ASSESSMENT — ACTIVITIES OF DAILY LIVING (ADL): ADLS_ACUITY_SCORE: 52

## 2025-05-06 NOTE — ED TRIAGE NOTES
PT states on Saturday she noticed left arm pain. Denies injury or strenuous activity. PT states she went to urgent care today and they noted a elevated WBC and CRP. Sent her here for further eval. PT also has a cough at this time     Triage Assessment (Adult)       Row Name 05/06/25 1011          Triage Assessment    Airway WDL WDL        Respiratory WDL    Respiratory WDL X;cough     Cough Frequency frequent     Cough Type congested        Skin Circulation/Temperature WDL    Skin Circulation/Temperature WDL WDL        Cardiac WDL    Cardiac WDL WDL        Peripheral/Neurovascular WDL    Peripheral Neurovascular WDL WDL        Cognitive/Neuro/Behavioral WDL    Cognitive/Neuro/Behavioral WDL WDL

## 2025-05-06 NOTE — ED NOTES
Expected Patient Referral to ED  9:57 AM    Referring Clinic/Provider:  [facility]- Dr. Arthur    Reason for referral/Clinical facts:  Atraumatic left elbow pain, elevated white blood cell count, elevated CRP, x-ray without fracture but tiny effusion    Recommendations provided:  Consider further workup/management as an outpatient-consult orthopedic surgery    Caller was informed that this institution does possess the capabilities and/or resources to provide for patient and should be transferred to our facility.    Discussed that if direct admit is sought and any hurdles are encountered, this ED would be happy to see the patient and evaluate.    Informed caller that recommendations provided are recommendations based only on the facts provided and that they responsible to accept or reject the advice, or to seek a formal in person consultation as needed and that this ED will see/treat patient should they arrive.      Umair Zuleta MD  [facility] EMERGENCY ROOM  [address] [facility] [id number]  [phone]       Umair Zuleta MD  05/06/25 0927

## 2025-05-06 NOTE — Clinical Note
Luis Antonio Laura was seen and treated in our emergency department on 5/6/2025.  She may return to work on 05/12/2025.       If you have any questions or concerns, please don't hesitate to call.      Star Garcia MD

## 2025-05-06 NOTE — ED PROVIDER NOTES
EMERGENCY DEPARTMENT ENCOUNTER      NAME: Luis Antonio Laura  AGE: 34 year old female  YOB: 1990  MRN: [id number]  EVALUATION DATE & TIME: 5/6/2025 10:06 AM    PCP: Medina Melton    ED PROVIDER: Star Garcia M.D.      Chief Complaint   Patient presents with    Arm Pain         FINAL IMPRESSION:  1. Community acquired pneumonia, unspecified laterality    2. Left elbow pain          ED COURSE & MEDICAL DECISION MAKING:    Pertinent Labs & Imaging studies reviewed below.  All EKGs below represent my independent interpretation.   ED Course as of 05/06/25 1113   Tue May 06, 2025   1048 Patient is a 34-year-old woman who presents with left elbow pain.  Severe for the last few days.  Also had cough, subjective fevers and chills over the weekend.  On arrival she has blood pressure 125/107 with normal temperature and heart rate.  The left elbow is tender, mostly the lateral aspect.  Resists any flexion, extension, pronation or supination.  She was seen at the emergency room prior to arrival here, x-ray showed tiny effusion, white count was elevated at 13, CRP was elevated in their system at 5.  Normal lactate.   1049 Perform bedside ultrasound,   1049  I did not see any large joint effusion that was amenable to easy arthrocentesis, she had about a 1 cm pocket along the distal humerus.  I discussed my findings with patient, did not recommend arthrocentesis at this time given likely insignificant effusion.  I would have expected a large joint effusion, or fevers, if this was bacterial.  Plan to treat as inflammatory joint with steroids, anti-inflammatories. Prn Oxycodone   1050 She is history of smoking, with rhonchorous breath sounds, cough, increased pedal production.  Started on azithromycin here.   1050 Discharged with recommendation to follow-up with primary care for cough, clinical diagnosis of community-acquired pneumonia, as well as orthopedics for her elbow.       Additional ED Course timestamps  entered by medical scribe:  10:19 AM Met with the patient     Medical Decision Making    Discharge. I prescribed additional prescription strength medication(s) as charted. N/A.    MIPS (CTPE, Dental pain, Luna, Sinusitis, Asthma/COPD, Head Trauma): Not Applicable    SEPSIS: None    MEDICATIONS GIVEN IN THE EMERGENCY:  Medications   predniSONE (DELTASONE) tablet 40 mg (40 mg Oral $Given 5/6/25 1051)   oxyCODONE (ROXICODONE) tablet 5 mg (5 mg Oral $Given 5/6/25 1051)         NEW PRESCRIPTIONS STARTED AT TODAY'S ER VISIT  Discharge Medication List as of 5/6/2025 10:59 AM        START taking these medications    Details   acetaminophen (TYLENOL) 500 MG tablet Take 2 tablets (1,000 mg) by mouth 3 times daily., Disp-30 tablet, R-0, E-Prescribe      azithromycin (ZITHROMAX) 250 MG tablet Take 2 tablets (500 mg) by mouth daily for 1 day, THEN 1 tablet (250 mg) daily for 4 days., Disp-6 tablet, R-0, E-Prescribe      !! ibuprofen (ADVIL/MOTRIN) 600 MG tablet Take 1 tablet (600 mg) by mouth every 6 hours as needed for moderate pain., Disp-30 tablet, R-0, E-Prescribe      oxyCODONE (ROXICODONE) 5 MG tablet Take 1 tablet (5 mg) by mouth every 6 hours as needed for severe pain., Disp-6 tablet, R-0, E-Prescribe      predniSONE (DELTASONE) 20 MG tablet Take 2 tablets (40 mg) by mouth daily for 4 days. Please take your first dose tomorrow, Disp-8 tablet, R-0, E-Prescribe       !! - Potential duplicate medications found. Please discuss with provider.             =================================================================    HPI    Luis Antonio Laura is a 34 year old female who presents to this ED for evaluation of arm pain. Patient endorses she woke up on Saturday (3 days ago) with a dull left arm pain. She initially thought this was generalized body aches as she has been dealing with some productive cough and nausea. However, the cough and nausea have improved but the arm pain has worsened and yesterday, patient reports she  "was unable to extend her arm. Denies any abnormal extraneous activity over the weekend. No history of issues with this arm in the past. Patient is taking venlafaxine and trazodone, and reports no recent changes with these. Patient is right hand dominant. No other complaints or concerns at this time.      Per chart review, patient visited [facility] [place] on 5/6/25 regarding arm pain. Unable to move left arm since Sunday but is able to move fingers and wrist. Mild URI with cough. Normal temperature of 97.1F. White blood count of 13.7. Lactic acid 1.1. CRP 3.4. X ray show no fracture and a tiny effusion.     VITALS:  BP (!) 162/101   Pulse 93   Temp 98.4  F (36.9  C) (Oral)   Resp 18   Ht 1.575 m (5' 2\")   Wt 121.1 kg (267 lb)   LMP 04/24/2025   SpO2 97%   BMI 48.83 kg/m      PHYSICAL EXAM    Constitutional: Comfortable appearing.  HENT: Atraumatic, mucous membranes moist, nose normal. Neck- Supple, gross ROM intact.   Eyes: Pupils mid-range, conjunctiva without injection, no discharge.   Respiratory: Expiratory bronchi in left lung fields, no respiratory distress, no wheezing, speaks full sentences easily. Occasional wet cough.  Cardiovascular: Normal heart rate, regular rhythm, no murmurs.   GI: Soft, no tenderness to deep palpation in all quadrants, no masses.  Musculoskeletal: Tenderness to the left olecranon mostly to the lateral aspect, pain with pronation and supination, no pain or tenderness to the upper arm or down to the forearm. No obvious deformity.  Skin: Warm, dry, no rash.  Neurologic: Alert & oriented x 3, cranial nerves grossly intact.  Psychiatric: Affect normal, cooperative.    Procedures:  POC US SOFT TISSUE    Date/Time: 5/6/2025 11:12 AM    Performed by: Star Garcia MD  Authorized by: Star Garcia MD    Procedure Details & Findings:      PROCEDURE: Emergency Department Limited Bedside Screening Ultrasound  TYPE:  ED LIMITED BEDSIDE SCREENING US - SOFT " TISSUE  INDICATIONS: Elbow pain  PROCEDURE PROVIDER: Dayton Garcia   WINDOW AND FINDINGS: Soft tissue, left elbow  Findings: very small joint effusion  IMAGES SAVED AND STORED FOR ARCHIVE AND REVIEW: Yes              I, Aida Ignacio am serving as a scribe to document services personally performed by Dr. Star Garcia based on my observation and the provider's statements to me. I, Star Garcia MD attest that Aida Pierre is acting in a scribe capacity, has observed my performance of the services and has documented them in accordance with my direction.    Star Garcia M.D.  Emergency Medicine  [facility] EMERGENCY ROOM  [address] [facility] [id number] [phone]  Dept: [phone]       Star Garcia MD  05/06/25 1112       Star Garcia MD  05/06/25 111

## 2025-05-06 NOTE — DISCHARGE INSTRUCTIONS
Please take your first dose of antibiotics when you pick them up at the pharmacy today.    Please take your next dose of prednisone tomorrow.  This is a steroid that will help bring down inflammation in the joint.    The pain you are experiencing has a large inflammatory component to it, so anti-inflammatories will be very helpful. Please take: 600 mg of ibuprofen AND 1000 mg of tylenol three times a day. It is ok to take these medications at the same time. After 2-3 days, please take the medications only as needed.     Oxycodone as needed for breakthrough pain.    Call [facility] today, let them know that you are seen here for elbow pain and that we recommended prompt follow-up for full diagnostic evaluation and treatment plan.

## 2025-05-07 ENCOUNTER — PATIENT OUTREACH (OUTPATIENT)
Dept: CARE COORDINATION | Facility: CLINIC | Age: 35
End: 2025-05-07
Payer: COMMERCIAL

## 2025-05-07 NOTE — PROGRESS NOTES
General acute hospital  Community Health Worker Initial Outreach    CHW Initial Information Gathering:  Referral Source: ED Follow-Up  CHW Additional Questions  If ED/Hospital discharge, follow-up appointment scheduled as recommended?: No  Patient agreeable to assistance with scheduling?: Yes  CHW assisted patient to schedule (specify): CHW scheduled ED follow-up on 5/12/2025  MyChart active?: Yes    Patient accepts CC: No, patient declined at this time. Patient will be sent Care Coordination introduction letter for future reference.       Carrie Evans  Community Health Worker  [facility] Care Resource [place], [facility]    *Connected Care Resource Team does NOT follow patient ongoing. Referrals are identified based on internal discharge reports and the outreach is to ensure patient has an understanding of their discharge instructions.

## 2025-05-07 NOTE — LETTER
Luis Antonio Laura  [address] [address]    Dear Luis Antonio Laura,      I am a team member within the Connected Care Resource Center with [facility]. I recently contacted you to ensure you are doing well following a visit within our health system. I also wanted to take this chance to introduce Clinic Care Coordination should you have any interest in this program in the future.    Below is a description of Clinic Care Coordination and how this team can further assist you:       The Clinic Care Coordination team is made up of a Registered Nurse, , Financial Resource Worker, and a Community Health Worker who understand and can help navigate the health care system. The goal of clinic care coordination is to help you manage your health, improve access to care, and achieve optimal health outcomes. They work alongside your provider to assist you in determining your health and social needs, obtain health care and community resources, and provide you with necessary information and education. Clinic Care Coordination can work with you through any barriers and develop a care plan that helps coordinate and strengthen the relationship between you and your care team.    If you wish to connect with the Clinic Care Coordination Team, please let your [facility] Primary Care Provider or Clinic Care Team know and they can place a referral. The Clinic Care Coordination team will then reach out by phone to further support you.    We are focused on providing you with the highest-quality healthcare experience possible.    Sincerely,   Your care team with [facility]'s [address] [place] [address] ([phone]).

## 2025-05-09 RX ORDER — VENLAFAXINE HYDROCHLORIDE 150 MG/1
150 CAPSULE, EXTENDED RELEASE ORAL DAILY
COMMUNITY
Start: 2025-01-10

## 2025-05-12 ENCOUNTER — OFFICE VISIT (OUTPATIENT)
Dept: FAMILY MEDICINE | Facility: CLINIC | Age: 35
End: 2025-05-12
Payer: COMMERCIAL

## 2025-05-12 VITALS
SYSTOLIC BLOOD PRESSURE: 127 MMHG | HEART RATE: 84 BPM | HEIGHT: 62 IN | BODY MASS INDEX: 53.92 KG/M2 | DIASTOLIC BLOOD PRESSURE: 84 MMHG | OXYGEN SATURATION: 98 % | RESPIRATION RATE: 17 BRPM | TEMPERATURE: 97.8 F | WEIGHT: 293 LBS

## 2025-05-12 DIAGNOSIS — R05.1 ACUTE COUGH: Primary | ICD-10-CM

## 2025-05-12 DIAGNOSIS — M25.522 LEFT ELBOW PAIN: ICD-10-CM

## 2025-05-12 PROCEDURE — 99213 OFFICE O/P EST LOW 20 MIN: CPT | Performed by: NURSE PRACTITIONER

## 2025-05-12 PROCEDURE — 1126F AMNT PAIN NOTED NONE PRSNT: CPT | Performed by: NURSE PRACTITIONER

## 2025-05-12 PROCEDURE — 3079F DIAST BP 80-89 MM HG: CPT | Performed by: NURSE PRACTITIONER

## 2025-05-12 PROCEDURE — 3074F SYST BP LT 130 MM HG: CPT | Performed by: NURSE PRACTITIONER

## 2025-05-12 RX ORDER — ALBUTEROL SULFATE 90 UG/1
1-2 INHALANT RESPIRATORY (INHALATION) EVERY 6 HOURS PRN
Qty: 18 G | Refills: 3 | Status: SHIPPED | OUTPATIENT
Start: 2025-05-12

## 2025-05-12 ASSESSMENT — PATIENT HEALTH QUESTIONNAIRE - PHQ9
10. IF YOU CHECKED OFF ANY PROBLEMS, HOW DIFFICULT HAVE THESE PROBLEMS MADE IT FOR YOU TO DO YOUR WORK, TAKE CARE OF THINGS AT HOME, OR GET ALONG WITH OTHER PEOPLE: SOMEWHAT DIFFICULT
SUM OF ALL RESPONSES TO PHQ QUESTIONS 1-9: 16
SUM OF ALL RESPONSES TO PHQ QUESTIONS 1-9: 16

## 2025-05-12 ASSESSMENT — PAIN SCALES - GENERAL: PAINLEVEL_OUTOF10: NO PAIN (0)

## 2025-05-12 NOTE — PROGRESS NOTES
"  Assessment & Plan     Community-acquired pneumonia  Symptoms have overall improved.  Lungs are clear on examination.  She still has a mild, intermittent cough and is requesting refill of albuterol today.  Prescription will be sent to her pharmacy.  We reviewed signs and symptoms of worsening illness to monitor for and return precautions.  - albuterol (VENTOLIN HFA) 108 (90 Base) MCG/ACT inhaler  Dispense: 18 g; Refill: 3    Left elbow pain  Symptoms are also improving with left elbow.  She is no longer needing to wear sling.  Range of motion is intact and she denies any discomfort currently.  Discussed option for physical therapy if symptoms recur or worsen again.      MED REC REQUIRED  Post Medication Reconciliation Status:     BMI  Estimated body mass index is 54.02 kg/m  as calculated from the following:    Height as of this encounter: 1.575 m (5' 2.01\").    Weight as of this encounter: 134 kg (295 lb 6.4 oz).   Weight management plan: Discussed healthy diet and exercise guidelines        Baldomero Salmon is a 34 year old, presenting for the following health issues:  ER F/U and Weight Management      5/12/2025     9:09 AM   Additional Questions   Roomed by Africa PALMER      The patient presents today for follow-up from recent ER visit.  She presented to the ER with left elbow pain.  She had woken up with a dull discomfort and noticed some decreased range of motion.  No injury that she can recall.  She also had some generalized bodyaches, productive cough and nausea.  Workup in the ER indicated community-acquired pneumonia diagnosis and she was treated with Z-Omero and oral prednisone.  She states that her symptoms have been improving and she denies any new or worsening of symptoms since hospital discharge.  She is requesting a refill of albuterol inhaler to have for intermittent coughing.  Her left elbow pain has also improved.  She has almost full range of motion back but has some limited external " "rotation.  She denies any continued pain over her elbow.  No swelling or discoloration.  She reports feeling well today and does not have any additional concerns at this time.    Review of Systems - pertinent positives noted in HPI, otherwise ROS is negative.        Objective    /84 (BP Location: Left arm, Patient Position: Sitting, Cuff Size: Adult Large)   Pulse 84   Temp 97.8  F (36.6  C) (Temporal)   Resp 17   Ht 1.575 m (5' 2.01\")   Wt 134 kg (295 lb 6.4 oz)   LMP 04/24/2025   SpO2 98%   BMI 54.02 kg/m    Body mass index is 54.02 kg/m .  Physical Exam   GENERAL: alert and no distress  RESP: lungs clear to auscultation - no rales, rhonchi or wheezes  CV: regular rate and rhythm, normal S1 S2, no S3 or S4, no murmur, click or rub, no peripheral edema  MS: no gross musculoskeletal defects noted, no edema.  Left elbow is without any obvious deformity, edema or discoloration.  Range of motion ans strength is intact.  NEURO: Normal strength and tone, mentation intact and speech normal          Signed Electronically by: BRYN Martinez CNP    Answers submitted by the patient for this visit:  Patient Health Questionnaire (Submitted on 5/12/2025)  If you checked off any problems, how difficult have these problems made it for you to do your work, take care of things at home, or get along with other people?: Somewhat difficult  PHQ9 TOTAL SCORE: 16    "

## 2025-08-12 ENCOUNTER — PATIENT OUTREACH (OUTPATIENT)
Dept: CARE COORDINATION | Facility: CLINIC | Age: 35
End: 2025-08-12
Payer: COMMERCIAL

## 2025-08-26 ENCOUNTER — PATIENT OUTREACH (OUTPATIENT)
Dept: CARE COORDINATION | Facility: CLINIC | Age: 35
End: 2025-08-26
Payer: COMMERCIAL

## (undated) DEVICE — SOL WATER IRRIG 1000ML BOTTLE 2F7114

## (undated) DEVICE — DRAPE IOBAN 2 C-SECTION 3M 6697

## (undated) DEVICE — SUTURE PDS 0 60IN CTX+ LOOPED PDP990G

## (undated) DEVICE — PLATE GROUNDING ADULT W/CORD 9165L

## (undated) DEVICE — SU CHROMIC 0 CT 36" 914H

## (undated) DEVICE — GLOVE SURG PI ULTRA TOUCH M SZ 6-1/2 LF

## (undated) DEVICE — GLOVE SURG PI ULTRA TOUCH M SZ 8 LF

## (undated) DEVICE — PACK MINOR SINGLE BASIN SSK3001

## (undated) DEVICE — STPL SKIN SUBCUTICULAR INSORB  2030

## (undated) DEVICE — DRESSING MEPILEX BORDER POST-OP 4X10

## (undated) DEVICE — BLADE CLIPPER PIVOT PURPLE DISP 9660

## (undated) DEVICE — SUTURE VICRYL+ 3-0 27IN CT-1 UND VCP258H

## (undated) DEVICE — GOWN IMPERVIOUS BREATHABLE SMART LG 89015

## (undated) DEVICE — PREP CHLORAPREP 26ML TINTED HI-LITE ORANGE 930815

## (undated) DEVICE — SOL NACL 0.9% IRRIG 1000ML BOTTLE 2F7124

## (undated) DEVICE — SURGICEL POWDER ABSORBABLE HEMOSTAT 3GM 3013SP

## (undated) DEVICE — GOWN IMPERVIOUS BREATHABLE 2XL/XLONG

## (undated) DEVICE — GLOVE SURG PI ULTRA TOUCH M SZ 7 LF 42670

## (undated) DEVICE — PACK MAJOR BASIN 673

## (undated) DEVICE — CUSTOM PACK C-SECTION LHE

## (undated) DEVICE — SUCTION MANIFOLD NEPTUNE 2 SYS 1 PORT 702-025-000

## (undated) RX ORDER — FENTANYL CITRATE-0.9 % NACL/PF 10 MCG/ML
PLASTIC BAG, INJECTION (ML) INTRAVENOUS
Status: DISPENSED
Start: 2022-05-19

## (undated) RX ORDER — MORPHINE SULFATE 1 MG/ML
INJECTION, SOLUTION EPIDURAL; INTRATHECAL; INTRAVENOUS
Status: DISPENSED
Start: 2022-05-19

## (undated) RX ORDER — KETOROLAC TROMETHAMINE 30 MG/ML
INJECTION, SOLUTION INTRAMUSCULAR; INTRAVENOUS
Status: DISPENSED
Start: 2022-05-19